# Patient Record
Sex: FEMALE | Race: WHITE | Employment: OTHER | URBAN - METROPOLITAN AREA
[De-identification: names, ages, dates, MRNs, and addresses within clinical notes are randomized per-mention and may not be internally consistent; named-entity substitution may affect disease eponyms.]

---

## 2017-07-09 ENCOUNTER — APPOINTMENT (OUTPATIENT)
Dept: CT IMAGING | Age: 72
End: 2017-07-09
Attending: STUDENT IN AN ORGANIZED HEALTH CARE EDUCATION/TRAINING PROGRAM
Payer: OTHER GOVERNMENT

## 2017-07-09 ENCOUNTER — HOSPITAL ENCOUNTER (EMERGENCY)
Age: 72
Discharge: HOME | End: 2017-07-10
Attending: EMERGENCY MEDICINE
Payer: OTHER GOVERNMENT

## 2017-07-09 VITALS
RESPIRATION RATE: 18 BRPM | WEIGHT: 154.2 LBS | SYSTOLIC BLOOD PRESSURE: 190 MMHG | TEMPERATURE: 98.6 F | OXYGEN SATURATION: 99 % | DIASTOLIC BLOOD PRESSURE: 90 MMHG | HEART RATE: 99 BPM

## 2017-07-09 DIAGNOSIS — R31.9 URINARY TRACT INFECTION WITH HEMATURIA, SITE UNSPECIFIED: Primary | ICD-10-CM

## 2017-07-09 DIAGNOSIS — F31.9 BIPOLAR 1 DISORDER (HCC): ICD-10-CM

## 2017-07-09 DIAGNOSIS — H10.9 CONJUNCTIVITIS OF BOTH EYES, UNSPECIFIED CONJUNCTIVITIS TYPE: ICD-10-CM

## 2017-07-09 DIAGNOSIS — N39.0 URINARY TRACT INFECTION WITH HEMATURIA, SITE UNSPECIFIED: Primary | ICD-10-CM

## 2017-07-09 LAB
ALBUMIN SERPL BCP-MCNC: 4.5 G/DL (ref 3.5–5)
ALBUMIN/GLOB SERPL: 1.1 {RATIO} (ref 1.1–2.2)
ALP SERPL-CCNC: 63 U/L (ref 45–117)
ALT SERPL-CCNC: 24 U/L (ref 12–78)
ANION GAP BLD CALC-SCNC: 12 MMOL/L (ref 5–15)
APAP SERPL-MCNC: <2 UG/ML (ref 10–30)
APPEARANCE UR: ABNORMAL
AST SERPL W P-5'-P-CCNC: 13 U/L (ref 15–37)
BACTERIA URNS QL MICRO: ABNORMAL /HPF
BASOPHILS # BLD AUTO: 0 K/UL (ref 0–0.1)
BASOPHILS # BLD: 0 % (ref 0–1)
BILIRUB SERPL-MCNC: 0.8 MG/DL (ref 0.2–1)
BILIRUB UR QL: NEGATIVE
BUN SERPL-MCNC: 13 MG/DL (ref 6–20)
BUN/CREAT SERPL: 21 (ref 12–20)
CALCIUM SERPL-MCNC: 9.3 MG/DL (ref 8.5–10.1)
CHLORIDE SERPL-SCNC: 102 MMOL/L (ref 97–108)
CO2 SERPL-SCNC: 23 MMOL/L (ref 21–32)
COLOR UR: ABNORMAL
CREAT SERPL-MCNC: 0.61 MG/DL (ref 0.55–1.02)
EOSINOPHIL # BLD: 0.1 K/UL (ref 0–0.4)
EOSINOPHIL NFR BLD: 1 % (ref 0–7)
EPITH CASTS URNS QL MICRO: ABNORMAL /LPF
ERYTHROCYTE [DISTWIDTH] IN BLOOD BY AUTOMATED COUNT: 12.4 % (ref 11.5–14.5)
ETHANOL SERPL-MCNC: <10 MG/DL
GLOBULIN SER CALC-MCNC: 4.1 G/DL (ref 2–4)
GLUCOSE SERPL-MCNC: 158 MG/DL (ref 65–100)
GLUCOSE UR STRIP.AUTO-MCNC: NEGATIVE MG/DL
HCT VFR BLD AUTO: 43.9 % (ref 35–47)
HGB BLD-MCNC: 15.5 G/DL (ref 11.5–16)
HGB UR QL STRIP: ABNORMAL
HYALINE CASTS URNS QL MICRO: ABNORMAL /LPF (ref 0–5)
KETONES UR QL STRIP.AUTO: NEGATIVE MG/DL
LEUKOCYTE ESTERASE UR QL STRIP.AUTO: ABNORMAL
LYMPHOCYTES # BLD AUTO: 31 % (ref 12–49)
LYMPHOCYTES # BLD: 2.7 K/UL (ref 0.8–3.5)
MCH RBC QN AUTO: 30.6 PG (ref 26–34)
MCHC RBC AUTO-ENTMCNC: 35.3 G/DL (ref 30–36.5)
MCV RBC AUTO: 86.6 FL (ref 80–99)
MONOCYTES # BLD: 0.5 K/UL (ref 0–1)
MONOCYTES NFR BLD AUTO: 6 % (ref 5–13)
NEUTS SEG # BLD: 5.4 K/UL (ref 1.8–8)
NEUTS SEG NFR BLD AUTO: 62 % (ref 32–75)
NITRITE UR QL STRIP.AUTO: POSITIVE
PH UR STRIP: 5.5 [PH] (ref 5–8)
PLATELET # BLD AUTO: 218 K/UL (ref 150–400)
POTASSIUM SERPL-SCNC: 3.4 MMOL/L (ref 3.5–5.1)
PROT SERPL-MCNC: 8.6 G/DL (ref 6.4–8.2)
PROT UR STRIP-MCNC: 30 MG/DL
RBC # BLD AUTO: 5.07 M/UL (ref 3.8–5.2)
RBC #/AREA URNS HPF: ABNORMAL /HPF (ref 0–5)
SALICYLATES SERPL-MCNC: 2.2 MG/DL (ref 2.8–20)
SODIUM SERPL-SCNC: 137 MMOL/L (ref 136–145)
SP GR UR REFRACTOMETRY: 1.01 (ref 1–1.03)
UROBILINOGEN UR QL STRIP.AUTO: 0.2 EU/DL (ref 0.2–1)
WBC # BLD AUTO: 8.7 K/UL (ref 3.6–11)
WBC URNS QL MICRO: >100 /HPF (ref 0–4)

## 2017-07-09 PROCEDURE — 36415 COLL VENOUS BLD VENIPUNCTURE: CPT | Performed by: STUDENT IN AN ORGANIZED HEALTH CARE EDUCATION/TRAINING PROGRAM

## 2017-07-09 PROCEDURE — 90791 PSYCH DIAGNOSTIC EVALUATION: CPT

## 2017-07-09 PROCEDURE — 80307 DRUG TEST PRSMV CHEM ANLYZR: CPT | Performed by: EMERGENCY MEDICINE

## 2017-07-09 PROCEDURE — 80053 COMPREHEN METABOLIC PANEL: CPT | Performed by: STUDENT IN AN ORGANIZED HEALTH CARE EDUCATION/TRAINING PROGRAM

## 2017-07-09 PROCEDURE — 70450 CT HEAD/BRAIN W/O DYE: CPT

## 2017-07-09 PROCEDURE — 96365 THER/PROPH/DIAG IV INF INIT: CPT

## 2017-07-09 PROCEDURE — 80307 DRUG TEST PRSMV CHEM ANLYZR: CPT | Performed by: STUDENT IN AN ORGANIZED HEALTH CARE EDUCATION/TRAINING PROGRAM

## 2017-07-09 PROCEDURE — 99284 EMERGENCY DEPT VISIT MOD MDM: CPT

## 2017-07-09 PROCEDURE — 85025 COMPLETE CBC W/AUTO DIFF WBC: CPT | Performed by: STUDENT IN AN ORGANIZED HEALTH CARE EDUCATION/TRAINING PROGRAM

## 2017-07-09 PROCEDURE — 81001 URINALYSIS AUTO W/SCOPE: CPT | Performed by: STUDENT IN AN ORGANIZED HEALTH CARE EDUCATION/TRAINING PROGRAM

## 2017-07-10 LAB
AMPHET UR QL SCN: NEGATIVE
BARBITURATES UR QL SCN: NEGATIVE
BENZODIAZ UR QL: NEGATIVE
CANNABINOIDS UR QL SCN: NEGATIVE
COCAINE UR QL SCN: NEGATIVE
DRUG SCRN COMMENT,DRGCM: NORMAL
METHADONE UR QL: NEGATIVE
OPIATES UR QL: NEGATIVE
PCP UR QL: NEGATIVE

## 2017-07-10 PROCEDURE — 74011250636 HC RX REV CODE- 250/636: Performed by: EMERGENCY MEDICINE

## 2017-07-10 PROCEDURE — 74011250637 HC RX REV CODE- 250/637: Performed by: EMERGENCY MEDICINE

## 2017-07-10 PROCEDURE — 74011000258 HC RX REV CODE- 258: Performed by: EMERGENCY MEDICINE

## 2017-07-10 RX ORDER — LORAZEPAM 1 MG/1
1 TABLET ORAL
Status: COMPLETED | OUTPATIENT
Start: 2017-07-10 | End: 2017-07-10

## 2017-07-10 RX ORDER — CEPHALEXIN 500 MG/1
500 CAPSULE ORAL 3 TIMES DAILY
Qty: 21 CAP | Refills: 0 | Status: SHIPPED | OUTPATIENT
Start: 2017-07-10 | End: 2017-07-17

## 2017-07-10 RX ORDER — CEFTRIAXONE 1 G/1
INJECTION, POWDER, FOR SOLUTION INTRAMUSCULAR; INTRAVENOUS
Status: DISCONTINUED
Start: 2017-07-10 | End: 2017-07-10 | Stop reason: HOSPADM

## 2017-07-10 RX ORDER — ERYTHROMYCIN 5 MG/G
1 OINTMENT OPHTHALMIC EVERY 6 HOURS
Qty: 28 TUBE | Refills: 0 | Status: SHIPPED | OUTPATIENT
Start: 2017-07-10 | End: 2017-07-17

## 2017-07-10 RX ORDER — SODIUM CHLORIDE 900 MG/100ML
INJECTION INTRAVENOUS
Status: DISCONTINUED
Start: 2017-07-10 | End: 2017-07-10 | Stop reason: HOSPADM

## 2017-07-10 RX ORDER — LORAZEPAM 1 MG/1
1 TABLET ORAL
Qty: 12 TAB | Refills: 0 | Status: ON HOLD | OUTPATIENT
Start: 2017-07-10 | End: 2018-07-26

## 2017-07-10 RX ADMIN — LORAZEPAM 1 MG: 1 TABLET ORAL at 02:15

## 2017-07-10 RX ADMIN — CEFTRIAXONE 1 G: 1 INJECTION, POWDER, FOR SOLUTION INTRAMUSCULAR; INTRAVENOUS at 01:23

## 2017-07-10 NOTE — ED PROVIDER NOTES
HPI Comments: 70 y.o. female with past medical history significant for HTN, diabetes, anxiety, bipolar 1 disorder who presents accompanied by son with chief complaint of altered mental status. Son states patient has been confused and has not been making sense since 7/4/17, stating she was fine when she came up from [place] on 3/23/17. Son states patient recently moved into an independent living facility, with patient stating \"that place isn't suitable for dogs. \" Son states he's heard her arguing with neighbors and she has been \"throwing stuff around. \" Son states patient has had behavioral issues in the past and was given Depakote with patient states \"it was because of my stress. \" Son believes that patient may have stopped taking her medication because she feels as if she no longer needs them. Son states patient was admitted for 1 week while in [place] for \"behavior modification. \" Son states patient fell and hit her head while she was in [place]. Son states patient has had some b/l eye redness for approx 1 month, stating that it's worsened since this morning. Patient states her eyes are \"gritty\" and states she's had some double vision and light sensitivity. Patient states her right eye is normally worse than the left. Patient also complains of nausea and a left sided headache. There are no other acute medical concerns at this time. Social hx: never smoker, no alcohol  PCP: None    Note written by uJan Daniel Duque, as dictated by Teodoro Jolly MD 10:37 PM     The history is provided by the patient and a relative. No  was used. Past Medical History:   Diagnosis Date    Anxiety     Bipolar 1 disorder [facility])     son not sure where patient was diagnosed    Diabetes ([facility] [address]     Hypertension        History reviewed. No pertinent surgical history. History reviewed. No pertinent family history.     Social History     Social History    Marital status:      Spouse name: N/A   [facility] Number of children: N/A    Years of education: N/A     Occupational History    Not on file. Social History Main Topics    Smoking status: Never Smoker    Smokeless tobacco: Never Used    Alcohol use No    Drug use: Not on file    Sexual activity: Not on file     Other Topics Concern    Not on file     Social History Narrative    No narrative on file         ALLERGIES: Review of patient's allergies indicates no known allergies. Review of Systems   Constitutional: Negative for fever. HENT: Negative for facial swelling. Eyes: Positive for photophobia, discharge, redness, itching and visual disturbance. Respiratory: Negative for chest tightness. Cardiovascular: Negative for chest pain. Gastrointestinal: Positive for nausea. Negative for abdominal pain. Genitourinary: Negative for dysuria. Musculoskeletal: Negative for arthralgias. Skin: Negative for rash. Neurological: Positive for headaches. Negative for dizziness. Hematological: Negative for adenopathy. Psychiatric/Behavioral: Positive for confusion. Negative for suicidal ideas. Vitals:    07/09/17 2114   BP: 190/90   Pulse: 99   Resp: 18   Temp: 98.6 °F (37 °C)   SpO2: 99%   Weight: 69.9 kg (154 lb 3.2 oz)            Physical Exam   Constitutional: She is oriented to person, place, and time. She appears well-developed and well-nourished. No distress. HENT:   Head: Normocephalic and atraumatic. Mouth/Throat: Oropharynx is clear and moist.   Eyes: Pupils are equal, round, and reactive to light. Right conjunctiva is injected. Left conjunctiva is injected. Red conjunctiva with perilimbic sparing in both eyes, right greater than left   Neck: Normal range of motion. Neck supple. No thyromegaly present. Cardiovascular: Normal rate, regular rhythm, normal heart sounds and intact distal pulses. No murmur heard. Pulmonary/Chest: Effort normal and breath sounds normal. No respiratory distress. Abdominal: Soft.  Bowel sounds are normal. She exhibits no distension. There is no tenderness. Musculoskeletal: Normal range of motion. She exhibits no edema. Neurological: She is alert and oriented to person, place, and time. Skin: Skin is warm and dry. No rash noted. She is not diaphoretic. Psychiatric: Her affect is inappropriate. Some plative ideas   Nursing note and vitals reviewed. Note written by Juan Daniel Izaguirre, as dictated by Jimenez Perez MD 10:59 PM      MDM  Number of Diagnoses or Management Options  Bipolar 1 disorder [facility]): Conjunctivitis of both eyes, unspecified conjunctivitis type:   Urinary tract infection with hematuria, site unspecified:   Diagnosis management comments: A:  79yo F accompanied by son for increasing confused behavior and pressured speech. Pt with h/o Bipolar and son suspects not taking medications. Denies SI/HI. Pt also notes b/l eye irritation for past several days. VS stable. P:  [facility] consult  Noncompliance with bipolar medications  Labs UA  B/l conjunctivitis. ED Course       Procedures    1:58 AM  Pt seen by [facility] and cleared for discharge. Plan for outpatient f/u. Keflex for UTI. Erythromycin eye ointment for b/l conjunctivitis and f/u with [facility].

## 2017-07-10 NOTE — ED TRIAGE NOTES
Triage: Patient arrives ambulatory from home escorted by children seeking psychiatric admission. Patients son states this happened before, but also believes mother may have had a seizure earlier. He noticed some jerking motions and reports his mother never lost consciousness, but did lose control of her bladder. Patient having flight of ideas in triage, very difficult to interview and redirect. Son reports his mother arrived from [place] to be moved into assisted living and has had disputes there, reports she is under a great deal of stress at this time. Denies ETOH/drug use. Patient has complaints of \"feeling she has a UTI\" and is sleep deprived. Patient is oriented x 4. Denies SI/HI.

## 2017-07-10 NOTE — ED NOTES
Patient ambulatory to bathroom. Urine sample provided. Nurse at bedside patient asking Alexi Numbers you real?, I've been sleep walking and I just don't know. Where am I? Am I in [place]? I want to go [place] I need to get my passport, cause Trump\". Patient then stating \"I need you to check my urine and blood so I can call the cab\".

## 2017-07-10 NOTE — BSMART NOTE
Comprehensive Assessment Form Part 1      Section I - Disposition    Axis I -  Unspecified Bipolar Disorder  Axis II - Deferred  Axis III -   Past Medical History:   Diagnosis Date    Anxiety     Bipolar 1 disorder [facility])     son not sure where patient was diagnosed    Diabetes ([facility] [address]     Hypertension        Axis IV - Recent move from [place], conflicts at independent living facility  [place] [facility] - 40      The Medical Doctor to Psychiatrist conference was not completed. The Medical Doctor is in agreement with Psychiatrist disposition because of (reason) Patient is pending medical clearance. The plan is disposition with psychiatrist when cleared medically. The on-call Psychiatrist consulted was Dr. Gonsales be called when cleared. The admitting Psychiatrist will be Dr. Nataly Lr. The admitting Diagnosis is Bipolar. The Payor source is  [facility]    Section II - Integrated Summary  Summary:  Patient came in accompanied by her son due to multiple complaints. Patient reported she had \"five little seizures,\" headache, back pain and muscle spasms. Patient has a psychiatric history but son with her is unclear of all details. He reported she was admitted in [place] and was on Depakote. He indicated she stops taking medication. Patient presents as anxious and tangential.  Patient does not often answer a direct question. Patient will ramble on about loosely related topics. Patient reported decreased appetite. She denied SI, HI, and hallucinations. Patient reported she \"disassociates\" and speaks of Shae Steen, and Adriano Vargas wanting to [place] people. \"  Per son she has recently moved here and is having disputes with other residents in the facility. Patient reported she wants to leave that place and then starts going off on tangent about [place] and [employer]. Patient reported she throws things to make herself feel better. The patienthas demonstrated mental capacity to provide informed consent.   The information is given by the patient and relative(s). The Chief Complaint is multiple symptoms. The Precipitant Factors are recent move, medication noncompliance. Previous Hospitalizations: Yes  Current Psychiatrist and/or  is NA. Lethality Assessment:    The potential for suicide noted by the following:Patient denied SI and history of attempts. The potential for homicide is not noted. The patient has not been a perpetrator of sexual or physical abuse. There are not pending charges. The patient is not felt to be at risk for self harm or harm to others. The attending nurse was advised that security has not been notified. Section III - Psychosocial  The patient's overall mood and attitude is anxious. Feelings of helplessness and hopelessness are not observed. Generalized anxiety is not observed. Panic is not observed. Phobias are not observed. Obsessive compulsive tendencies are not observed. Section IV - Mental Status Exam  The patient's appearance shows no evidence of impairment. The patient's behavior is manic  and is restless. The patient is oriented to time, place, person and situation. The patient's speech is pressured. The patient's mood is anxious. The range of affect is innappropriate. The patient's thought content demonstrates no evidence of impairment. The thought process shows a flight of ideas and is tangential.  The patient's perception shows no evidence of impairment. The patient's memory shows no evidence of impairment. The patient's appetite is decreased. The patient's sleep has evidence of insomnia. The patient shows no insight. The patient's judgement is psychologically impaired. Section V - Substance Abuse  The patient is not using substances. Section VI - Living Arrangements  The patient is . The patient lives alone. The patient has 3 children . The patient does plan to return home upon discharge. The patient does not have legal issues pending.  The patient's source of income comes from social security. Buddhist and cultural practices have not been voiced at this time. The patient's greatest support comes from son and this person will be involved with the treatment. The patient has not been in an event described as horrible or outside the realm of ordinary life experience either currently or in the past.  The patient has not been a victim of sexual/physical abuse. Section VII - Other Areas of Clinical Concern  The highest grade achieved is NA with the overall quality of school experience being described as NA. The patient is currently unemployed and speaks [place] as a primary language. The patient has no communication impairments affecting communication. The patient's preference for learning can be described as: can read and write adequately.   The patient's hearing is normal.  The patient's vision is normal.      Parish Otoole, MARTHA

## 2017-07-10 NOTE — DISCHARGE INSTRUCTIONS
Pinkeye: Care Instructions  Your Care Instructions    Pinkeye is redness and swelling of the eye surface and the conjunctiva (the lining of the eyelid and the covering of the white part of the eye). Pinkeye is also called conjunctivitis. Pinkeye is often caused by infection with bacteria or a virus. Dry air, allergies, smoke, and chemicals are other common causes. Pinkeye often clears on its own in 7 to 10 days. Antibiotics only help if the pinkeye is caused by bacteria. Pinkeye caused by infection spreads easily. If an allergy or chemical is causing pinkeye, it will not go away unless you can avoid whatever is causing it. Follow-up care is a key part of your treatment and safety. Be sure to make and go to all appointments, and call your doctor if you are having problems. It's also a good idea to know your test results and keep a list of the medicines you take. How can you care for yourself at home? · Wash your hands often. Always wash them before and after you treat pinkeye or touch your eyes or face. · Use moist cotton or a clean, wet cloth to remove crust. Wipe from the inside corner of the eye to the outside. Use a clean part of the cloth for each wipe. · Put cold or warm wet cloths on your eye a few times a day if the eye hurts. · Do not wear contact lenses or eye makeup until the pinkeye is gone. Throw away any eye makeup you were using when you got pinkeye. Clean your contacts and storage case. If you wear disposable contacts, use a new pair when your eye has cleared and it is safe to wear contacts again. · If the doctor gave you antibiotic ointment or eyedrops, use them as directed. Use the medicine for as long as instructed, even if your eye starts looking better soon. Keep the bottle tip clean, and do not let it touch the eye area. · To put in eyedrops or ointment:  ¨ Tilt your head back, and pull your lower eyelid down with one finger.   ¨ Drop or squirt the medicine inside the lower lid.  ¨ Close your eye for 30 to 60 seconds to let the drops or ointment move around. ¨ Do not touch the ointment or dropper tip to your eyelashes or any other surface. · Do not share towels, pillows, or washcloths while you have pinkeye. When should you call for help? Call your doctor now or seek immediate medical care if:  · You have pain in your eye, not just irritation on the surface. · You have a change in vision or loss of vision. · You have an increase in discharge from the eye. · Your eye has not started to improve or begins to get worse within 48 hours after you start using antibiotics. · Pinkeye lasts longer than 7 days. Watch closely for changes in your health, and be sure to contact your doctor if you have any problems. Where can you learn more? Go to http://saulTxCellreynold.info/. Enter Y392 in the search box to learn more about \"Pinkeye: Care Instructions. \"  Current as of: March 20, 2017  Content Version: 11.3  © 7623-6482 [employer]. Care instructions adapted under license by [employer] (which disclaims liability or warranty for this information). If you have questions about a medical condition or this instruction, always ask your healthcare professional. [address] any warranty or liability for your use of this information. Urinary Tract Infection in Women: Care Instructions  Your Care Instructions    A urinary tract infection, or UTI, is a general term for an infection anywhere between the kidneys and the urethra (where urine comes out). Most UTIs are bladder infections. They often cause pain or burning when you urinate. UTIs are caused by bacteria and can be cured with antibiotics. Be sure to complete your treatment so that the infection goes away. Follow-up care is a key part of your treatment and safety. Be sure to make and go to all appointments, and call your doctor if you are having problems.  It's also a good idea to know your test results and keep a list of the medicines you take. How can you care for yourself at home? · Take your antibiotics as directed. Do not stop taking them just because you feel better. You need to take the full course of antibiotics. · Drink extra water and other fluids for the next day or two. This may help wash out the bacteria that are causing the infection. (If you have kidney, heart, or liver disease and have to limit fluids, talk with your doctor before you increase your fluid intake.)  · Avoid drinks that are carbonated or have caffeine. They can irritate the bladder. · Urinate often. Try to empty your bladder each time. · To relieve pain, take a hot bath or lay a heating pad set on low over your lower belly or genital area. Never go to sleep with a heating pad in place. To prevent UTIs  · Drink plenty of water each day. This helps you urinate often, which clears bacteria from your system. (If you have kidney, heart, or liver disease and have to limit fluids, talk with your doctor before you increase your fluid intake.)  · Urinate when you need to. · Urinate right after you have sex. · Change sanitary pads often. · Avoid douches, bubble baths, feminine hygiene sprays, and other feminine hygiene products that have deodorants. · After going to the bathroom, wipe from front to back. When should you call for help? Call your doctor now or seek immediate medical care if:  · Symptoms such as fever, chills, nausea, or vomiting get worse or appear for the first time. · You have new pain in your back just below your rib cage. This is called flank pain. · There is new blood or pus in your urine. · You have any problems with your antibiotic medicine. Watch closely for changes in your health, and be sure to contact your doctor if:  · You are not getting better after taking an antibiotic for 2 days. · Your symptoms go away but then come back. Where can you learn more?   Go to http://saul-reynold.info/. Enter Y056 in the search box to learn more about \"Urinary Tract Infection in Women: Care Instructions. \"  Current as of: November 28, 2016  Content Version: 11.3  © 0446-4741 [employer]. Care instructions adapted under license by [employer] (which disclaims liability or warranty for this information). If you have questions about a medical condition or this instruction, always ask your healthcare professional. [address] any warranty or liability for your use of this information. Learning About Mood Disorders  What are mood disorders? Mood disorders are medical problems that affect how you feel. They can impact your moods, thoughts, and actions. Mood disorders include:  · Depression. This causes you to feel sad or hopeless for much of the time. · Bipolar disorder. This causes extreme mood changes from manic episodes of very high energy to extreme lows of depression. · Seasonal affective disorder (SAD). This is a type of depression that affects you during the same season each year. Most often people experience SAD during the fall and winter months when days are shorter and there is less light. What are the symptoms? Depression  You may:  · Feel sad or hopeless nearly every day. · Lose interest in or not get pleasure from most daily activities. You feel this way nearly every day. · Have low energy, changes in your appetite, or changes in how well you sleep. · Have trouble concentrating. · Think about death and suicide. Keep the numbers for these national suicide hotlines: 2-090-085-TALK (5-979.263.8604) and 4-437-GWAKSCY (7-105.730.9917). If you or someone you know talks about suicide or feeling hopeless, get help right away. Bipolar disorder  Symptoms depend on your mood swings. You may:  · Feel very happy, energetic, or on edge. · Feel like you need very little sleep.   · Feel overly self-confident. · Do impulsive things, such as spending a lot of money. · Feel sad or hopeless. · Have racing thoughts or trouble thinking and making decisions. · Lose interest in things you have enjoyed in the past.  · Think about death and suicide. Keep the numbers for these national suicide hotlines: 3-525-123-TALK (2-491.501.5590) and 7-163-INZDIGO (1-962.831.8190). If you or someone you know talks about suicide or feeling hopeless, get help right away. Seasonal affective disorder (SAD)  Symptoms come and go at about the same time each year. For most people with SAD, symptoms come during the winter when there is less daylight. You may:  · Feel sad, grumpy, conn, or anxious. · Lose interest in your usual activities. · Eat more and crave carbohydrates, such as bread and pasta. · Gain weight. · Sleep more and feel drowsy during the daytime. How are mood disorders treated? Mood disorders can be treated with medicines or counseling, or a combination of both. Medicines for depression and SAD may include antidepressants. Medicines for bipolar disorder may include:  · Mood stabilizers. · Antipsychotics. · Benzodiazepines. Counseling may involve cognitive-behavioral therapy. It teaches you how to change the ways you think and behave. This can help you stop thinking bad thoughts about yourself and your life. Light therapy is the main treatment for SAD. This therapy uses a special kind of lamp. You let the lamp shine on you at certain times, usually in the morning. This may help your symptoms during the months when there is less sunlight. Healthy lifestyle  Healthy lifestyle changes may help you feel better. · Get at least 30 minutes of exercise on most days of the week. Walking is a good choice. · Eat a healthy diet. Include fruits, vegetables, lean proteins, and whole grains in your diet each day. · Keep a regular sleep schedule. Try for 8 hours of sleep a night.   · Find ways to manage stress, such as relaxation exercises. · Avoid alcohol and illegal drugs. Follow-up care is a key part of your treatment and safety. Be sure to make and go to all appointments, and call your doctor if you are having problems. It's also a good idea to know your test results and keep a list of the medicines you take. Where can you learn more? Go to http://saul-reynold.info/. Enter Z931 in the search box to learn more about \"Learning About Mood Disorders. \"  Current as of: May 3, 2017  Content Version: 11.3  © 2200-9113 "[employer]. Care instructions adapted under license by [employer] (which disclaims liability or warranty for this information). If you have questions about a medical condition or this instruction, always ask your healthcare professional. [address] any warranty or liability for your use of this information. We hope that we have addressed all of your medical concerns. The examination and treatment you received in the Emergency Department were for an emergent problem and were not intended as complete care. It is important that you follow up with your healthcare provider(s) for ongoing care. If your symptoms worsen or do not improve as expected, and you are unable to reach your usual health care provider(s), you should return to the Emergency Department. Today's healthcare is undergoing tremendous change, and patient satisfaction surveys are one of the many tools to assess the quality of medical care. You may receive a survey from the [employer] regarding your experience in the Emergency Department. I hope that your experience has been completely positive, particularly the medical care that I provided. As such, please participate in the survey; anything less than excellent does not meet my expectations or intentions.         [address] [facility] and [facility] participate in nationally recognized quality of care measures. If your blood pressure is greater than 120/80, as reported below, we urge that you seek medical care to address the potential of high blood pressure, commonly known as hypertension. Hypertension can be hereditary or can be caused by certain medical conditions, pain, stress, or \"white coat syndrome. \"       Please make an appointment with your health care provider(s) for follow up of your Emergency Department visit. VITALS:   Patient Vitals for the past 8 hrs:   Temp Pulse Resp BP SpO2   07/09/17 2114 98.6 °F (37 °C) 99 18 190/90 99 %          Thank you for allowing us to provide you with medical care today. We realize that you have many choices for your emergency care needs. Please choose us in the future for any continued health care needs. Latrice Khan MD    [facility], [facility].   Office: [phone]            Recent Results (from the past 24 hour(s))   CBC WITH AUTOMATED DIFF    Collection Time: 07/09/17  9:22 PM   Result Value Ref Range    WBC 8.7 3.6 - 11.0 K/uL    RBC 5.07 3.80 - 5.20 M/uL    HGB 15.5 11.5 - 16.0 g/dL    HCT 43.9 35.0 - 47.0 %    MCV 86.6 80.0 - 99.0 FL    MCH 30.6 26.0 - 34.0 PG    MCHC 35.3 30.0 - 36.5 g/dL    RDW 12.4 11.5 - 14.5 %    PLATELET 619 813 - 112 K/uL    NEUTROPHILS 62 32 - 75 %    LYMPHOCYTES 31 12 - 49 %    MONOCYTES 6 5 - 13 %    EOSINOPHILS 1 0 - 7 %    BASOPHILS 0 0 - 1 %    ABS. NEUTROPHILS 5.4 1.8 - 8.0 K/UL    ABS. LYMPHOCYTES 2.7 0.8 - 3.5 K/UL    ABS. MONOCYTES 0.5 0.0 - 1.0 K/UL    ABS. EOSINOPHILS 0.1 0.0 - 0.4 K/UL    ABS.  BASOPHILS 0.0 0.0 - 0.1 K/UL   METABOLIC PANEL, COMPREHENSIVE    Collection Time: 07/09/17  9:22 PM   Result Value Ref Range    Sodium 137 136 - 145 mmol/L    Potassium 3.4 (L) 3.5 - 5.1 mmol/L    Chloride 102 97 - 108 mmol/L    CO2 23 21 - 32 mmol/L    Anion gap 12 5 - 15 mmol/L    Glucose 158 (H) 65 - 100 mg/dL    BUN 13 6 - 20 MG/DL    Creatinine 0.61 0.55 - 1.02 MG/DL    BUN/Creatinine ratio 21 (H) 12 - 20      GFR est AA >60 >60 ml/min/1.73m2    GFR est non-AA >60 >60 ml/min/1.73m2    Calcium 9.3 8.5 - 10.1 MG/DL    Bilirubin, total 0.8 0.2 - 1.0 MG/DL    ALT (SGPT) 24 12 - 78 U/L    AST (SGOT) 13 (L) 15 - 37 U/L    Alk. phosphatase 63 45 - 117 U/L    Protein, total 8.6 (H) 6.4 - 8.2 g/dL    Albumin 4.5 3.5 - 5.0 g/dL    Globulin 4.1 (H) 2.0 - 4.0 g/dL    A-G Ratio 1.1 1.1 - 2.2     ETHYL ALCOHOL    Collection Time: 07/09/17  9:22 PM   Result Value Ref Range    ALCOHOL(ETHYL),SERUM <10 <10 MG/DL   ACETAMINOPHEN    Collection Time: 07/09/17  9:22 PM   Result Value Ref Range    Acetaminophen level <2 (L) 10 - 30 ug/mL   SALICYLATE    Collection Time: 07/09/17  9:22 PM   Result Value Ref Range    SALICYLATE 2.2 (L) 2.8 - 20.0 MG/DL   DRUG SCREEN, URINE    Collection Time: 07/09/17 11:40 PM   Result Value Ref Range    AMPHETAMINES NEGATIVE  NEG      BARBITURATES NEGATIVE  NEG      BENZODIAZEPINE NEGATIVE  NEG      COCAINE NEGATIVE  NEG      METHADONE NEGATIVE  NEG      OPIATES NEGATIVE  NEG      PCP(PHENCYCLIDINE) NEGATIVE  NEG      THC (TH-CANNABINOL) NEGATIVE  NEG      Drug screen comment (NOTE)    URINALYSIS W/MICROSCOPIC    Collection Time: 07/09/17 11:41 PM   Result Value Ref Range    Color YELLOW/STRAW      Appearance CLOUDY (A) CLEAR      Specific gravity 1.010 1.003 - 1.030      pH (UA) 5.5 5.0 - 8.0      Protein 30 (A) NEG mg/dL    Glucose NEGATIVE  NEG mg/dL    Ketone NEGATIVE  NEG mg/dL    Bilirubin NEGATIVE  NEG      Blood SMALL (A) NEG      Urobilinogen 0.2 0.2 - 1.0 EU/dL    Nitrites POSITIVE (A) NEG      Leukocyte Esterase LARGE (A) NEG      WBC >100 (H) 0 - 4 /hpf    RBC 0-5 0 - 5 /hpf    Epithelial cells FEW FEW /lpf    Bacteria 4+ (A) NEG /hpf    Hyaline cast 0-2 0 - 5 /lpf       Ct Head Wo Cont    Result Date: 7/9/2017  EXAM:  CT HEAD WO CONT INDICATION:   \"worst headache of  her life\" COMPARISON: None.  TECHNIQUE: Unenhanced CT of the head was performed using 5 mm images. Brain and bone windows were generated. CT dose reduction was achieved through use of a standardized protocol tailored for this examination and automatic exposure control for dose modulation. FINDINGS: The ventricles and sulci are normal in size, shape and configuration and midline. There is no significant white matter disease. There is no intracranial hemorrhage, extra-axial collection, mass, mass effect or midline shift. The basilar cisterns are open. No acute infarct is identified. The bone windows demonstrate no abnormalities. The visualized portions of the paranasal sinuses and mastoid air cells are clear. IMPRESSION: No acute intracranial process.

## 2018-07-25 ENCOUNTER — HOSPITAL ENCOUNTER (INPATIENT)
Age: 73
LOS: 6 days | Discharge: HOME | DRG: 885 | End: 2018-08-01
Attending: EMERGENCY MEDICINE | Admitting: PSYCHIATRY & NEUROLOGY
Payer: MEDICARE

## 2018-07-25 ENCOUNTER — APPOINTMENT (OUTPATIENT)
Dept: CT IMAGING | Age: 73
DRG: 885 | End: 2018-07-25
Attending: EMERGENCY MEDICINE
Payer: MEDICARE

## 2018-07-25 DIAGNOSIS — E87.6 HYPOKALEMIA: ICD-10-CM

## 2018-07-25 DIAGNOSIS — F29 PSYCHOSIS, UNSPECIFIED PSYCHOSIS TYPE (HCC): ICD-10-CM

## 2018-07-25 DIAGNOSIS — R41.82 ALTERED MENTAL STATUS, UNSPECIFIED ALTERED MENTAL STATUS TYPE: Primary | ICD-10-CM

## 2018-07-25 LAB
ALBUMIN SERPL-MCNC: 3.6 G/DL (ref 3.5–5)
ALBUMIN/GLOB SERPL: 1.1 {RATIO} (ref 1.1–2.2)
ALP SERPL-CCNC: 57 U/L (ref 45–117)
ALT SERPL-CCNC: 33 U/L (ref 12–78)
ANION GAP SERPL CALC-SCNC: 10 MMOL/L (ref 5–15)
AST SERPL-CCNC: 20 U/L (ref 15–37)
BASOPHILS # BLD: 0 K/UL (ref 0–0.1)
BASOPHILS NFR BLD: 1 % (ref 0–1)
BILIRUB SERPL-MCNC: 0.5 MG/DL (ref 0.2–1)
BUN SERPL-MCNC: 14 MG/DL (ref 6–20)
BUN/CREAT SERPL: 23 (ref 12–20)
CALCIUM SERPL-MCNC: 9 MG/DL (ref 8.5–10.1)
CHLORIDE SERPL-SCNC: 104 MMOL/L (ref 97–108)
CO2 SERPL-SCNC: 28 MMOL/L (ref 21–32)
CREAT SERPL-MCNC: 0.61 MG/DL (ref 0.55–1.02)
DIFFERENTIAL METHOD BLD: NORMAL
EOSINOPHIL # BLD: 0.1 K/UL (ref 0–0.4)
EOSINOPHIL NFR BLD: 1 % (ref 0–7)
ERYTHROCYTE [DISTWIDTH] IN BLOOD BY AUTOMATED COUNT: 11.7 % (ref 11.5–14.5)
GLOBULIN SER CALC-MCNC: 3.4 G/DL (ref 2–4)
GLUCOSE SERPL-MCNC: 196 MG/DL (ref 65–100)
HCT VFR BLD AUTO: 37.3 % (ref 35–47)
HGB BLD-MCNC: 12.7 G/DL (ref 11.5–16)
IMM GRANULOCYTES # BLD: 0 K/UL (ref 0–0.04)
IMM GRANULOCYTES NFR BLD AUTO: 0 % (ref 0–0.5)
LYMPHOCYTES # BLD: 1.9 K/UL (ref 0.8–3.5)
LYMPHOCYTES NFR BLD: 33 % (ref 12–49)
MCH RBC QN AUTO: 31 PG (ref 26–34)
MCHC RBC AUTO-ENTMCNC: 34 G/DL (ref 30–36.5)
MCV RBC AUTO: 91 FL (ref 80–99)
MONOCYTES # BLD: 0.5 K/UL (ref 0–1)
MONOCYTES NFR BLD: 8 % (ref 5–13)
NEUTS SEG # BLD: 3.2 K/UL (ref 1.8–8)
NEUTS SEG NFR BLD: 57 % (ref 32–75)
NRBC # BLD: 0 K/UL (ref 0–0.01)
NRBC BLD-RTO: 0 PER 100 WBC
PLATELET # BLD AUTO: 266 K/UL (ref 150–400)
PMV BLD AUTO: 9.8 FL (ref 8.9–12.9)
POTASSIUM SERPL-SCNC: 3 MMOL/L (ref 3.5–5.1)
PROT SERPL-MCNC: 7 G/DL (ref 6.4–8.2)
RBC # BLD AUTO: 4.1 M/UL (ref 3.8–5.2)
SODIUM SERPL-SCNC: 142 MMOL/L (ref 136–145)
WBC # BLD AUTO: 5.7 K/UL (ref 3.6–11)

## 2018-07-25 PROCEDURE — 80053 COMPREHEN METABOLIC PANEL: CPT | Performed by: EMERGENCY MEDICINE

## 2018-07-25 PROCEDURE — 80307 DRUG TEST PRSMV CHEM ANLYZR: CPT | Performed by: EMERGENCY MEDICINE

## 2018-07-25 PROCEDURE — 74011250636 HC RX REV CODE- 250/636: Performed by: EMERGENCY MEDICINE

## 2018-07-25 PROCEDURE — 74176 CT ABD & PELVIS W/O CONTRAST: CPT

## 2018-07-25 PROCEDURE — 90791 PSYCH DIAGNOSTIC EVALUATION: CPT

## 2018-07-25 PROCEDURE — 96361 HYDRATE IV INFUSION ADD-ON: CPT

## 2018-07-25 PROCEDURE — 85025 COMPLETE CBC W/AUTO DIFF WBC: CPT | Performed by: EMERGENCY MEDICINE

## 2018-07-25 PROCEDURE — 99285 EMERGENCY DEPT VISIT HI MDM: CPT

## 2018-07-25 PROCEDURE — 84443 ASSAY THYROID STIM HORMONE: CPT | Performed by: PSYCHIATRY & NEUROLOGY

## 2018-07-25 PROCEDURE — 96360 HYDRATION IV INFUSION INIT: CPT

## 2018-07-25 PROCEDURE — 77030005563 HC CATH URETH INT MMGH -A

## 2018-07-25 PROCEDURE — 51701 INSERT BLADDER CATHETER: CPT

## 2018-07-25 PROCEDURE — 36415 COLL VENOUS BLD VENIPUNCTURE: CPT | Performed by: EMERGENCY MEDICINE

## 2018-07-25 RX ADMIN — SODIUM CHLORIDE 1000 ML: 900 INJECTION, SOLUTION INTRAVENOUS at 23:42

## 2018-07-25 NOTE — IP AVS SNAPSHOT
Summary of Care Report The Summary of Care report has been created to help improve care coordination. Users with access to [employer] or [address] (Web-based application) may access additional patient information including the Discharge Summary. If you are not currently a [address] user and need more information, please call the number listed below in the [place] [address] section and ask to be connected with Medical Records. Facility Information Name Address Phone [address] [facility] [address] [address] [phone] Patient Information Patient Name Sex DEANNA Galvez ([id number]) Female 1945 Discharge Information Admitting Provider Service Area Unit Jackson Russell MD / [phone] [address] [facility] / [phone] Discharge Provider Discharge Date/Time Discharge Disposition Destination (none) 2018 (Pending) AHR (none) Patient Language Language ENGLISH [13] Hospital Problems as of 2018  Never Reviewed Class Noted - Resolved Last Modified POA Active Problems * (Principal)Bipolar disorder ([facility] [address]  2018 - Present 2018 by Daniel Granger MD Yes Entered by Kimberly Agudelo MD  
  
Non-Hospital Problems as of 2018  Never Reviewed Class Noted - Resolved Last Modified Active Problems Psychosis  2018 - Present 2018 by Kimberly Agudelo MD  
  Entered by Jackson Russell MD  
  
You are allergic to the following No active allergies Current Discharge Medication List  
  
START taking these medications Dose & Instructions Dispensing Information Comments  
 atorvastatin 40 mg tablet Commonly known as:  LIPITOR Dose:  40 mg Take 1 Tab by mouth daily. Indications: hyperlipidemia Quantity:  30 Tab Refills:  0 lidocaine 5 % Commonly known as:  Cassie Crespo Apply patch to the affected area for 12 hours a day and remove for 12 hours a day. Indications: POSTHERPETIC NEURALGIA Quantity:  90 Each Refills:  0  
   
 lithium carbonate  mg CR tablet Commonly known as:  ESKALITH CR Dose:  900 mg Take 2 Tabs by mouth nightly. Indications: Bipolar Disorder Quantity:  60 Tab Refills:  0  
   
 OLANZapine 10 mg tablet Commonly known as:  ZyPREXA Dose:  10 mg Take 1 Tab by mouth nightly. Indications: BIPOLAR DISORDER IN REMISSION Quantity:  30 Tab Refills:  0 phenazopyridine 200 mg tablet Commonly known as:  PYRIDIUM Dose:  200 mg Take 1 Tab by mouth two (2) times a day for 3 days. Indications: URINARY TRACT IRRITATION Quantity:  6 Tab Refills:  0 CONTINUE these medications which have CHANGED Dose & Instructions Dispensing Information Comments  
 metFORMIN 500 mg tablet Commonly known as:  GLUCOPHAGE What changed:   
- how much to take - when to take this Dose:  500 mg Take 1 Tab by mouth two (2) times daily (with meals). Indications: type 2 diabetes mellitus Quantity:  60 Tab Refills:  0 CONTINUE these medications which have NOT CHANGED Dose & Instructions Dispensing Information Comments FISH  mg Cap Generic drug:  Omega-3 Fatty Acids Dose:  1000 mg Take 1,000 mg by mouth. Indications: hypertriglyceridemia Refills:  0 STOP taking these medications Comments  
 lisinopril 2.5 mg tablet Commonly known as:  PRINIVIL, ZESTRIL  
   
   
 SEROquel 100 mg tablet Generic drug:  QUEtiapine VITAMIN D3 1,000 unit tablet Generic drug:  cholecalciferol Follow-up Information Follow up With Details Comments Contact Info Wilma Brar On 10/8/2018 You have a 9:00am appointment with your new primary care provider.  Please arrive by 8:30am to complete new patient paperwork. Remember to bring your photo ID and insurance card. [facility] 
[address] 
[phone] Star Sidney On 10/4/2018 You have a 1:30pm appointment with the psychiatric nurse practitioner for medication management. Please arrive by 1:00pm to complete new patient paperwork. Remember to bring your photo ID and insurance card. [address] 
[address] [facility] [address] , [address] 
[phone] Discharge Instructions DISCHARGE SUMMARY 
 
NAME:Enedelia mAaya : 1945 MRN: [id number] The patient Bakari Wharton exhibits the ability to control behavior in a less restrictive environment. Patient's level of functioning is improving. No assaultive/destructive behavior has been observed for the past 24 hours. No suicidal/homicidal threat or behavior has been observed for the past 24 hours. There is no evidence of serious medication side effects. Patient has not been in physical or protective restraints for at least the past 24 hours. If weapons involved, how are they secured? No weapons involved. Is patient aware of and in agreement with discharge plan? Yes Arrangements for medication:  Prescriptions given to patient. Referral for substance abuse treatment? Patient is not using using substances/Not applicable. Referral for smoking cessation needed? Patient is not a smoker/Not applicable. Copy of discharge instructions to provider?:  Zully Larson; Dr. Ar Henry Arrangements for transportation home:  Son to . Keep all follow up appointments as scheduled, continue to take prescribed medications per physician instructions. Mental health crisis number:  882 or your local mental health crisis line number at [phone]. DISCHARGE SUMMARY from Nurse PATIENT INSTRUCTIONS: 
 
What to do at Home: 
Recommended activity: Activity as tolerated,  
 
 If you experience any of the following symptoms thoughts of harming self, racing thoughts and that are rapidly changing topic, feeling overwhelmed with anxiety, hopelessness , please follow up with your assigned providers and local crisis number. *  Please give a list of your current medications to your Primary Care Provider. *  Please update this list whenever your medications are discontinued, doses are 
    changed, or new medications (including over-the-counter products) are added. *  Please carry medication information at all times in case of emergency situations. These are general instructions for a healthy lifestyle: No smoking/ No tobacco products/ Avoid exposure to second hand smoke Surgeon General's Warning:  Quitting smoking now greatly reduces serious risk to your health. Obesity, smoking, and sedentary lifestyle greatly increases your risk for illness A healthy diet, regular physical exercise & weight monitoring are important for maintaining a healthy lifestyle You may be retaining fluid if you have a history of heart failure or if you experience any of the following symptoms:  Weight gain of 3 pounds or more overnight or 5 pounds in a week, increased swelling in our hands or feet or shortness of breath while lying flat in bed. Please call your doctor as soon as you notice any of these symptoms; do not wait until your next office visit. Recognize signs and symptoms of STROKE: 
 
F-face looks uneven A-arms unable to move or move unevenly S-speech slurred or non-existent T-time-call 911 as soon as signs and symptoms begin-DO NOT go Back to bed or wait to see if you get better-TIME IS BRAIN. Warning Signs of HEART ATTACK Call 911 if you have these symptoms: 
? Chest discomfort.  Most heart attacks involve discomfort in the center of the chest that lasts more than a few minutes, or that goes away and comes back. It can feel like uncomfortable pressure, squeezing, fullness, or pain. ? Discomfort in other areas of the upper body. Symptoms can include pain or discomfort in one or both arms, the back, neck, jaw, or stomach. ? Shortness of breath with or without chest discomfort. ? Other signs may include breaking out in a cold sweat, nausea, or lightheadedness. Don't wait more than five minutes to call [address]! Fast action can save your life. Calling 911 is almost always the fastest way to get lifesaving treatment. Emergency Medical Services staff can begin treatment when they arrive  up to an hour sooner than if someone gets to the hospital by car. The discharge information has been reviewed with the patient. The patient verbalized understanding. Discharge medications reviewed with the patient and appropriate educational materials and side effects teaching were provided. ___________________________________________________________________________________________________________________________________ Chart Review Routing History No Routing History on File

## 2018-07-25 NOTE — IP AVS SNAPSHOT
[address] [facility] [address] [place] [address] 
[phone] Patient: Theresa Tellez MRN: [id number] :1945 A check camilo indicates which time of day the medication should be taken. My Medications START taking these medications Instructions Each Dose to Equal  
 Morning Noon Evening Bedtime  
 atorvastatin 40 mg tablet Commonly known as:  LIPITOR Your next dose is:  9am  
   
 Take 1 Tab by mouth daily. Indications: hyperlipidemia 40 mg  
    
  
   
   
   
  
 lidocaine 5 % Commonly known as:  Twyal Prophet Apply patch to the affected area for 12 hours a day and remove for 12 hours a day. Indications: POSTHERPETIC NEURALGIA  
     
   
   
   
  
 lithium carbonate  mg CR tablet Commonly known as:  ESKALITH CR Your next dose is:  Bedtime Take 2 Tabs by mouth nightly. Indications: Bipolar Disorder 900 mg  
    
   
   
   
  
  
 OLANZapine 10 mg tablet Commonly known as:  ZyPREXA Your next dose is:  bedtime Take 1 Tab by mouth nightly. Indications: BIPOLAR DISORDER IN REMISSION 10 mg  
    
   
   
   
  
  
 phenazopyridine 200 mg tablet Commonly known as:  PYRIDIUM Your next dose is:  9am and 9pm  
   
 Take 1 Tab by mouth two (2) times a day for 3 days. Indications: URINARY TRACT IRRITATION  
 200 mg CHANGE how you take these medications Instructions Each Dose to Equal  
 Morning Noon Evening Bedtime  
 metFORMIN 500 mg tablet Commonly known as:  GLUCOPHAGE What changed:   
- how much to take - when to take this Your next dose is:  9am and 5pm  
   
 Take 1 Tab by mouth two (2) times daily (with meals). Indications: type 2 diabetes mellitus 500 mg CONTINUE taking these medications Instructions Each Dose to Equal  
 Morning Noon Evening Bedtime FISH  mg Cap Generic drug:  Omega-3 Fatty Acids Your next dose is:  9am  
   
 Take 1,000 mg by mouth. Indications: hypertriglyceridemia 1000 mg  
    
  
   
   
   
  
  
STOP taking these medications   
 lisinopril 2.5 mg tablet Commonly known as:  PRINIVIL, ZESTRIL  
   
  
 SEROquel 100 mg tablet Generic drug:  QUEtiapine VITAMIN D3 1,000 unit tablet Generic drug:  cholecalciferol Where to Get Your Medications Information on where to get these meds will be given to you by the nurse or doctor. ! Ask your nurse or doctor about these medications  
  atorvastatin 40 mg tablet  
 lidocaine 5 %  
 lithium carbonate  mg CR tablet  
 metFORMIN 500 mg tablet OLANZapine 10 mg tablet  
 phenazopyridine 200 mg tablet

## 2018-07-25 NOTE — IP AVS SNAPSHOT
[address] [facility] 
[phone] Patient: Luciana Myers MRN: [id number] :1945 About your hospitalization You were admitted on:  2018 You last received care in the:  [address] [place] [address] You were discharged on:  2018 Why you were hospitalized Your primary diagnosis was:  Bipolar Disorder (Hcc) Follow-up Information Follow up With Details Comments Contact Info Neal Dowling On 10/8/2018 You have a 9:00am appointment with your new primary care provider. Please arrive by 8:30am to complete new patient paperwork. Remember to bring your photo ID and insurance card. [facility] 
[address] [facility] [address] [facility], [address] [place] [address] 
[phone] [facility] On 10/4/2018 You have a 1:30pm appointment with the psychiatric nurse practitioner for medication management. Please arrive by 1:00pm to complete new patient paperwork. Remember to bring your photo ID and insurance card. [address] 
[address], [address] [facility], [address] [place] [address] 
[phone] Discharge Orders None A check camilo indicates which time of day the medication should be taken. My Medications START taking these medications Instructions Each Dose to Equal  
 Morning Noon Evening Bedtime  
 atorvastatin 40 mg tablet Commonly known as:  LIPITOR Your next dose is:  9am  
   
 Take 1 Tab by mouth daily. Indications: hyperlipidemia 40 mg  
    
  
   
   
   
  
 lidocaine 5 % Commonly known as:  Jonathon Schaefer Apply patch to the affected area for 12 hours a day and remove for 12 hours a day. Indications: POSTHERPETIC NEURALGIA  
     
   
   
   
  
 lithium carbonate  mg CR tablet Commonly known as:  ESKALITH CR Your next dose is:  Bedtime Take 2 Tabs by mouth nightly. Indications: Bipolar Disorder  900 mg  
    
   
   
   
  
  
 OLANZapine 10 mg tablet Commonly known as:  ZyPREXA Your next dose is:  bedtime Take 1 Tab by mouth nightly. Indications: BIPOLAR DISORDER IN REMISSION 10 mg  
    
   
   
   
  
  
 phenazopyridine 200 mg tablet Commonly known as:  PYRIDIUM Your next dose is:  9am and 9pm  
   
 Take 1 Tab by mouth two (2) times a day for 3 days. Indications: URINARY TRACT IRRITATION  
 200 mg CHANGE how you take these medications Instructions Each Dose to Equal  
 Morning Noon Evening Bedtime  
 metFORMIN 500 mg tablet Commonly known as:  GLUCOPHAGE What changed:   
- how much to take - when to take this Your next dose is:  9am and 5pm  
   
 Take 1 Tab by mouth two (2) times daily (with meals). Indications: type 2 diabetes mellitus 500 mg CONTINUE taking these medications Instructions Each Dose to Equal  
 Morning Noon Evening Bedtime FISH  mg Cap Generic drug:  Omega-3 Fatty Acids Your next dose is:  9am  
   
 Take 1,000 mg by mouth. Indications: hypertriglyceridemia 1000 mg  
    
  
   
   
   
  
  
STOP taking these medications   
 lisinopril 2.5 mg tablet Commonly known as:  PRINIVIL, ZESTRIL  
   
  
 SEROquel 100 mg tablet Generic drug:  QUEtiapine VITAMIN D3 1,000 unit tablet Generic drug:  cholecalciferol Where to Get Your Medications Information on where to get these meds will be given to you by the nurse or doctor. ! Ask your nurse or doctor about these medications  
  atorvastatin 40 mg tablet  
 lidocaine 5 %  
 lithium carbonate  mg CR tablet  
 metFORMIN 500 mg tablet OLANZapine 10 mg tablet  
 phenazopyridine 200 mg tablet Discharge Instructions DISCHARGE SUMMARY 
 
NAME:Enedelia Kevin : 1945 MRN: [id number] The patient Gasper Burden exhibits the ability to control behavior in a less restrictive environment. Patient's level of functioning is improving. No assaultive/destructive behavior has been observed for the past 24 hours. No suicidal/homicidal threat or behavior has been observed for the past 24 hours. There is no evidence of serious medication side effects. Patient has not been in physical or protective restraints for at least the past 24 hours. If weapons involved, how are they secured? No weapons involved. Is patient aware of and in agreement with discharge plan? Yes Arrangements for medication:  Prescriptions given to patient. Referral for substance abuse treatment? Patient is not using using substances/Not applicable. Referral for smoking cessation needed? Patient is not a smoker/Not applicable. Copy of discharge instructions to provider?:  Cindy Solis; Dr. Raphael Gilford Arrangements for transportation home:  Son to . Keep all follow up appointments as scheduled, continue to take prescribed medications per physician instructions. Mental health crisis number:  418 or your local mental health crisis line number at [phone]. DISCHARGE SUMMARY from Nurse PATIENT INSTRUCTIONS: 
 
What to do at Home: 
Recommended activity: Activity as tolerated, If you experience any of the following symptoms thoughts of harming self, racing thoughts and that are rapidly changing topic, feeling overwhelmed with anxiety, hopelessness , please follow up with your assigned providers and local crisis number. *  Please give a list of your current medications to your Primary Care Provider. *  Please update this list whenever your medications are discontinued, doses are 
    changed, or new medications (including over-the-counter products) are added. *  Please carry medication information at all times in case of emergency situations. These are general instructions for a healthy lifestyle: No smoking/ No tobacco products/ Avoid exposure to second hand smoke Surgeon General's Warning:  Quitting smoking now greatly reduces serious risk to your health. Obesity, smoking, and sedentary lifestyle greatly increases your risk for illness A healthy diet, regular physical exercise & weight monitoring are important for maintaining a healthy lifestyle You may be retaining fluid if you have a history of heart failure or if you experience any of the following symptoms:  Weight gain of 3 pounds or more overnight or 5 pounds in a week, increased swelling in our hands or feet or shortness of breath while lying flat in bed. Please call your doctor as soon as you notice any of these symptoms; do not wait until your next office visit. Recognize signs and symptoms of STROKE: 
 
F-face looks uneven A-arms unable to move or move unevenly S-speech slurred or non-existent T-time-call 911 as soon as signs and symptoms begin-DO NOT go Back to bed or wait to see if you get better-TIME IS BRAIN. Warning Signs of HEART ATTACK Call 911 if you have these symptoms: 
? Chest discomfort. Most heart attacks involve discomfort in the center of the chest that lasts more than a few minutes, or that goes away and comes back. It can feel like uncomfortable pressure, squeezing, fullness, or pain. ? Discomfort in other areas of the upper body. Symptoms can include pain or discomfort in one or both arms, the back, neck, jaw, or stomach. ? Shortness of breath with or without chest discomfort. ? Other signs may include breaking out in a cold sweat, nausea, or lightheadedness. Don't wait more than five minutes to call [address]! Fast action can save your life. Calling 911 is almost always the fastest way to get lifesaving treatment. Emergency Medical Services staff can begin treatment when they arrive  up to an hour sooner than if someone gets to the hospital by car. The discharge information has been reviewed with the patient. The patient verbalized understanding. Discharge medications reviewed with the patient and appropriate educational materials and side effects teaching were provided. ___________________________________________________________________________________________________________________________________ [employer] Announcement We are excited to announce that we are making your provider's discharge notes available to you in [employer]. You will see these notes when they are completed and signed by the physician that discharged you from your recent hospital stay. If you have any questions or concerns about any information you see in The Do[employer] Campaign, please call the Health Information Department where you were seen or reach out to your Primary Care Provider for more information about your plan of care. Introducing [facility]! Petar Lao introduces The Do[employer] patient portal. Now you can access parts of your medical record, email your doctor's office, and request medication refills online. 1. In your internet browser, go to [email or web address] 2. Click on the First Time User? Click Here link in the Sign In box. You will see the New Member Sign Up page. 3. Enter your [employer] Access Code exactly as it appears below. You will not need to use this code after youve completed the sign-up process. If you do not sign up before the expiration date, you must request a new code. · The [employer] Access Code: [email or web address] Expires: 10/23/2018 11:05 PM 
 
4. Enter the last four digits of your Social Security Number (xxxx) and Date of Birth (mm/dd/yyyy) as indicated and click Submit. You will be taken to the next sign-up page. 5. Create a The [employer] ID. This will be your MyChart login ID and cannot be changed, so think of one that is secure and easy to remember. 6. Create a [employer] password. You can change your password at any time. 7. Enter your Password Reset Question and Answer. This can be used at a later time if you forget your password. 8. Enter your e-mail address. You will receive e-mail notification when new information is available in [address] 9. Click Sign Up. You can now view and download portions of your medical record. 10. Click the Download Summary menu link to download a portable copy of your medical information. If you have questions, please visit the Frequently Asked Questions section of the BrainRush website. Remember, [employer]Rush is NOT to be used for urgent needs. For medical emergencies, dial 911. Now available from your iPhone and Android! Introducing Kip Saldana As a [employer] patient, I wanted to make you aware of our electronic visit tool called Kip Saldana. [employer] 24/7 allows you to connect within minutes with a medical provider 24 hours a day, seven days a week via a mobile device or tablet or logging into a secure website from your computer. You can access Kip Saldana from anywhere in the [place]. A virtual visit might be right for you when you have a simple condition and feel like you just dont want to get out of bed, or cant get away from work for an appointment, when your regular [employer] provider is not available (evenings, weekends or holidays), or when youre out of town and need minor care. Electronic visits cost only $49 and if the [employer] 24/7 provider determines a prescription is needed to treat your condition, one can be electronically transmitted to a nearby pharmacy*. Please take a moment to enroll today if you have not already done so. The enrollment process is free and takes just a few minutes. To enroll, please download the [employer] 24/7 salomon to your tablet or phone, or visit [email or web address] to enroll on your computer.    
And, as an [address] [place] [address] patient with a [employer] account, the results of your visits will be scanned into your electronic medical record and your primary care provider will be able to view the scanned results. We urge you to continue to see your regular [facility] provider for your ongoing medical care. And while your primary care provider may not be the one available when you seek a Kip V[employer] virtual visit, the peace of mind you get from getting a real diagnosis real time can be priceless. For more information on [employer], view our Frequently Asked Questions (FAQs) at [email or web address]. Sincerely, 
 
Hilda Toure MD 
Chief Medical Officer Luis Oneill *:  certain medications cannot be prescribed via MOGL Unresulted Labs-Please follow up with your PCP about these lab tests Order Current Status CULTURE, URINE In process Providers Seen During Your Hospitalization Provider Specialty Primary office phone Letitia Sharma MD Emergency Medicine [phone] Amelia Michaud MD Psychiatry [phone] Scott Tristan MD Psychiatry [phone] Your Primary Care Physician (PCP) Primary Care Physician Office Phone Office Fax NONE ** None ** ** None ** You are allergic to the following No active allergies Recent Documentation Height Weight Breastfeeding? BMI OB Status Smoking Status 1.651 m 69.9 kg No 25.63 kg/m2 Hysterectomy Never Smoker Emergency Contacts Name Discharge Info Relation Home Work Mobile Luis F Hebert DISCHARGE CAREGIVER [3] Other Relative [6] [phone] Patient Belongings The following personal items are in your possession at time of discharge: 
  Dental Appliances: None  Visual Aid: Glasses      Home Medications: None   Jewelry: None  Clothing: Dress, Socks    Other Valuables: Zettie Boss, Cell Phone, Eyeglasses, Rosa Duet  Personal Items Sent to Safe: none Please provide this summary of care documentation to your next provider. Signatures-by signing, you are acknowledging that this After Visit Summary has been reviewed with you and you have received a copy. Patient Signature:  ____________________________________________________________ Date:  ____________________________________________________________  
  
Janyth Mins Provider Signature:  ____________________________________________________________ Date:  ____________________________________________________________

## 2018-07-26 PROBLEM — F31.9 BIPOLAR DISORDER (HCC): Status: ACTIVE | Noted: 2018-07-26

## 2018-07-26 PROBLEM — F29 PSYCHOSIS (HCC): Status: ACTIVE | Noted: 2018-07-26

## 2018-07-26 LAB
AMPHET UR QL SCN: NEGATIVE
APAP SERPL-MCNC: <2 UG/ML (ref 10–30)
APPEARANCE UR: CLEAR
ATRIAL RATE: 84 BPM
BACTERIA URNS QL MICRO: NEGATIVE /HPF
BARBITURATES UR QL SCN: NEGATIVE
BENZODIAZ UR QL: NEGATIVE
BILIRUB UR QL: NEGATIVE
CALCULATED P AXIS, ECG09: 76 DEGREES
CALCULATED R AXIS, ECG10: 25 DEGREES
CALCULATED T AXIS, ECG11: 27 DEGREES
CANNABINOIDS UR QL SCN: NEGATIVE
COCAINE UR QL SCN: NEGATIVE
COLOR UR: YELLOW
DIAGNOSIS, 93000: NORMAL
DRUG SCRN COMMENT,DRGCM: NORMAL
EPITH CASTS URNS QL MICRO: ABNORMAL /LPF
EST. AVERAGE GLUCOSE BLD GHB EST-MCNC: 134 MG/DL
ETHANOL SERPL-MCNC: <10 MG/DL
GLUCOSE BLD STRIP.AUTO-MCNC: 167 MG/DL (ref 65–100)
GLUCOSE BLD STRIP.AUTO-MCNC: 167 MG/DL (ref 65–100)
GLUCOSE UR STRIP.AUTO-MCNC: NEGATIVE MG/DL
HBA1C MFR BLD: 6.3 % (ref 4.2–6.3)
HGB UR QL STRIP: ABNORMAL
KETONES UR QL STRIP.AUTO: NEGATIVE MG/DL
LEUKOCYTE ESTERASE UR QL STRIP.AUTO: NEGATIVE
METHADONE UR QL: NEGATIVE
NITRITE UR QL STRIP.AUTO: NEGATIVE
OPIATES UR QL: NEGATIVE
P-R INTERVAL, ECG05: 128 MS
PCP UR QL: NEGATIVE
PH UR STRIP: 6.5 [PH] (ref 5–8)
PROT UR STRIP-MCNC: NEGATIVE MG/DL
Q-T INTERVAL, ECG07: 422 MS
QRS DURATION, ECG06: 82 MS
QTC CALCULATION (BEZET), ECG08: 498 MS
RBC #/AREA URNS HPF: ABNORMAL /HPF (ref 0–5)
SALICYLATES SERPL-MCNC: <1.7 MG/DL (ref 2.8–20)
SERVICE CMNT-IMP: ABNORMAL
SERVICE CMNT-IMP: ABNORMAL
SP GR UR REFRACTOMETRY: 1.01 (ref 1–1.03)
TSH SERPL DL<=0.05 MIU/L-ACNC: 1.04 UIU/ML (ref 0.36–3.74)
UR CULT HOLD, URHOLD: NORMAL
UROBILINOGEN UR QL STRIP.AUTO: 0.2 EU/DL (ref 0.2–1)
VENTRICULAR RATE, ECG03: 84 BPM
WBC URNS QL MICRO: ABNORMAL /HPF (ref 0–4)

## 2018-07-26 PROCEDURE — 83036 HEMOGLOBIN GLYCOSYLATED A1C: CPT | Performed by: PSYCHIATRY & NEUROLOGY

## 2018-07-26 PROCEDURE — 65220000003 HC RM SEMIPRIVATE PSYCH

## 2018-07-26 PROCEDURE — 36415 COLL VENOUS BLD VENIPUNCTURE: CPT | Performed by: PSYCHIATRY & NEUROLOGY

## 2018-07-26 PROCEDURE — 81001 URINALYSIS AUTO W/SCOPE: CPT | Performed by: EMERGENCY MEDICINE

## 2018-07-26 PROCEDURE — 74011250637 HC RX REV CODE- 250/637: Performed by: PSYCHIATRY & NEUROLOGY

## 2018-07-26 PROCEDURE — 74011000250 HC RX REV CODE- 250: Performed by: PSYCHIATRY & NEUROLOGY

## 2018-07-26 PROCEDURE — 82962 GLUCOSE BLOOD TEST: CPT

## 2018-07-26 PROCEDURE — 74011250637 HC RX REV CODE- 250/637: Performed by: EMERGENCY MEDICINE

## 2018-07-26 PROCEDURE — 74011250636 HC RX REV CODE- 250/636: Performed by: PSYCHIATRY & NEUROLOGY

## 2018-07-26 PROCEDURE — 80307 DRUG TEST PRSMV CHEM ANLYZR: CPT | Performed by: EMERGENCY MEDICINE

## 2018-07-26 PROCEDURE — 93005 ELECTROCARDIOGRAM TRACING: CPT

## 2018-07-26 RX ORDER — MICONAZOLE NITRATE 20 MG/G
CREAM TOPICAL 2 TIMES DAILY
Status: DISCONTINUED | OUTPATIENT
Start: 2018-07-26 | End: 2018-08-01 | Stop reason: HOSPADM

## 2018-07-26 RX ORDER — METFORMIN HYDROCHLORIDE 500 MG/1
TABLET ORAL
COMMUNITY
End: 2018-08-01

## 2018-07-26 RX ORDER — IBUPROFEN 400 MG/1
400 TABLET ORAL
Status: DISCONTINUED | OUTPATIENT
Start: 2018-07-26 | End: 2018-08-01 | Stop reason: HOSPADM

## 2018-07-26 RX ORDER — METFORMIN HYDROCHLORIDE 500 MG/1
500 TABLET ORAL DAILY
Status: DISCONTINUED | OUTPATIENT
Start: 2018-07-27 | End: 2018-07-31

## 2018-07-26 RX ORDER — HALOPERIDOL 5 MG/1
5 TABLET ORAL
Status: DISCONTINUED | OUTPATIENT
Start: 2018-07-26 | End: 2018-08-01 | Stop reason: HOSPADM

## 2018-07-26 RX ORDER — DEXTROSE 50 % IN WATER (D50W) INTRAVENOUS SYRINGE
12.5-25 AS NEEDED
Status: DISCONTINUED | OUTPATIENT
Start: 2018-07-26 | End: 2018-08-01 | Stop reason: HOSPADM

## 2018-07-26 RX ORDER — BENZTROPINE MESYLATE 1 MG/ML
1 INJECTION INTRAMUSCULAR; INTRAVENOUS
Status: DISCONTINUED | OUTPATIENT
Start: 2018-07-26 | End: 2018-08-01 | Stop reason: HOSPADM

## 2018-07-26 RX ORDER — LISINOPRIL 2.5 MG/1
2.5 TABLET ORAL DAILY
COMMUNITY
End: 2018-08-01

## 2018-07-26 RX ORDER — DIPHENHYDRAMINE HYDROCHLORIDE 50 MG/ML
50 INJECTION, SOLUTION INTRAMUSCULAR; INTRAVENOUS
Status: DISCONTINUED | OUTPATIENT
Start: 2018-07-26 | End: 2018-08-01 | Stop reason: HOSPADM

## 2018-07-26 RX ORDER — IBUPROFEN 200 MG
1 TABLET ORAL
Status: DISCONTINUED | OUTPATIENT
Start: 2018-07-26 | End: 2018-08-01 | Stop reason: HOSPADM

## 2018-07-26 RX ORDER — LIDOCAINE 50 MG/G
3 PATCH TOPICAL EVERY 24 HOURS
Status: DISCONTINUED | OUTPATIENT
Start: 2018-07-26 | End: 2018-07-27 | Stop reason: DRUGHIGH

## 2018-07-26 RX ORDER — ZOLPIDEM TARTRATE 5 MG/1
5 TABLET ORAL
Status: DISCONTINUED | OUTPATIENT
Start: 2018-07-26 | End: 2018-07-31

## 2018-07-26 RX ORDER — INSULIN LISPRO 100 [IU]/ML
INJECTION, SOLUTION INTRAVENOUS; SUBCUTANEOUS
Status: DISCONTINUED | OUTPATIENT
Start: 2018-07-26 | End: 2018-08-01 | Stop reason: HOSPADM

## 2018-07-26 RX ORDER — LORAZEPAM 2 MG/1
2 TABLET ORAL
Status: DISCONTINUED | OUTPATIENT
Start: 2018-07-26 | End: 2018-08-01 | Stop reason: HOSPADM

## 2018-07-26 RX ORDER — DIVALPROEX SODIUM 500 MG/1
500 TABLET, DELAYED RELEASE ORAL EVERY 12 HOURS
Status: DISCONTINUED | OUTPATIENT
Start: 2018-07-26 | End: 2018-07-27

## 2018-07-26 RX ORDER — LORAZEPAM 2 MG/ML
2 INJECTION INTRAMUSCULAR
Status: DISCONTINUED | OUTPATIENT
Start: 2018-07-26 | End: 2018-08-01 | Stop reason: HOSPADM

## 2018-07-26 RX ORDER — BENZTROPINE MESYLATE 1 MG/1
1 TABLET ORAL
Status: DISCONTINUED | OUTPATIENT
Start: 2018-07-26 | End: 2018-08-01 | Stop reason: HOSPADM

## 2018-07-26 RX ORDER — ADHESIVE BANDAGE
30 BANDAGE TOPICAL DAILY PRN
Status: DISCONTINUED | OUTPATIENT
Start: 2018-07-26 | End: 2018-08-01 | Stop reason: HOSPADM

## 2018-07-26 RX ORDER — QUETIAPINE FUMARATE 100 MG/1
100 TABLET, FILM COATED ORAL
COMMUNITY
End: 2018-08-01

## 2018-07-26 RX ORDER — DIPHENHYDRAMINE HCL 50 MG
50 CAPSULE ORAL
Status: DISCONTINUED | OUTPATIENT
Start: 2018-07-26 | End: 2018-08-01 | Stop reason: HOSPADM

## 2018-07-26 RX ORDER — MAGNESIUM SULFATE 100 %
4 CRYSTALS MISCELLANEOUS AS NEEDED
Status: DISCONTINUED | OUTPATIENT
Start: 2018-07-26 | End: 2018-08-01 | Stop reason: HOSPADM

## 2018-07-26 RX ORDER — OLANZAPINE 2.5 MG/1
2.5 TABLET ORAL
Status: DISCONTINUED | OUTPATIENT
Start: 2018-07-26 | End: 2018-07-31

## 2018-07-26 RX ORDER — ACETAMINOPHEN 325 MG/1
650 TABLET ORAL
Status: DISCONTINUED | OUTPATIENT
Start: 2018-07-26 | End: 2018-08-01 | Stop reason: HOSPADM

## 2018-07-26 RX ORDER — MELATONIN
1000 DAILY
COMMUNITY
End: 2018-08-01

## 2018-07-26 RX ORDER — POTASSIUM CHLORIDE 750 MG/1
40 TABLET, FILM COATED, EXTENDED RELEASE ORAL
Status: COMPLETED | OUTPATIENT
Start: 2018-07-26 | End: 2018-07-26

## 2018-07-26 RX ORDER — HALOPERIDOL 5 MG/ML
5 INJECTION INTRAMUSCULAR
Status: DISCONTINUED | OUTPATIENT
Start: 2018-07-26 | End: 2018-08-01 | Stop reason: HOSPADM

## 2018-07-26 RX ADMIN — DIPHENHYDRAMINE HYDROCHLORIDE 50 MG: 50 CAPSULE ORAL at 18:00

## 2018-07-26 RX ADMIN — DIVALPROEX SODIUM 500 MG: 500 TABLET, DELAYED RELEASE ORAL at 20:57

## 2018-07-26 RX ADMIN — POTASSIUM CHLORIDE 40 MEQ: 750 TABLET, EXTENDED RELEASE ORAL at 03:46

## 2018-07-26 RX ADMIN — IBUPROFEN 400 MG: 400 TABLET ORAL at 10:56

## 2018-07-26 RX ADMIN — HALOPERIDOL 5 MG: 5 TABLET ORAL at 16:11

## 2018-07-26 RX ADMIN — WATER 10 MG: 1 INJECTION INTRAMUSCULAR; INTRAVENOUS; SUBCUTANEOUS at 04:27

## 2018-07-26 RX ADMIN — LORAZEPAM 2 MG: 2 TABLET ORAL at 16:11

## 2018-07-26 RX ADMIN — ZOLPIDEM TARTRATE 5 MG: 5 TABLET ORAL at 22:00

## 2018-07-26 NOTE — BH NOTES
PSYCHIATRIC PROGRESS NOTE         Patient Name  Tessa Peterson   Date of Birth 1945   [facility] [id number]   Medical Record Number  [id number]      Age  68 y.o. PCP None   Admit date:  7/25/2018    Room Number  742/01  @ [facility]   Date of Service  7/26/2018          PSYCHOTHERAPY SESSION NOTE:  Length of psychotherapy session: 45 minutes    Main condition/diagnosis/issues treated during session today, 7/26/2018 : kar, medications, coping skills     I employed Cognitive Behavioral therapy techniques, Reality-Oriented psychotherapy, as well as supportive psychotherapy in regards to various ongoing psychosocial stressors, including the following: pre-admission and current problems; medical issues; and stress of hospitalization. Interpersonal relationship issues and psychodynamic conflicts explored. Attempts made to alleviate maladaptive patterns. We, also, worked on issues of denial & effects of substance dependency/use     Overall, patient is not progressing    Treatment Plan Update (reviewed an updated 7/26/2018) : I will modify psychotherapy tx plan by implementing more stress management strategies, building upon cognitive behavioral techniques, increasing coping skills, as well as shoring up psychological defenses). An extended energy and skill set was needed to engage pt in psychotherapy due to some of the following: resistiveness, complexity, negativity, confrontational nature, hostile behaviors, and/or severe abnormalities in thought processes/psychosis resulting in the loss of expressive/receptive language communication skills. E & M PROGRESS NOTE:         HISTORY       CC:  \"kar\"  HISTORY OF PRESENT ILLNESS/INTERVAL HISTORY:  (reviewed/updated 7/26/2018). per initial evaluation:     Tessa Peterson presents/reports/evidences the following emotional symptoms today, 7/26/2018:kar. The above symptoms have been present for years.  These symptoms are of severe severity. The symptoms are constant  in nature. Additional symptomatology and features include anxiety. SIDE EFFECTS: (reviewed/updated 7/26/2018)  None reported or admitted to. No noted toxicity with use of Depakote/Tegretol/lithium/Clozaril/TCAs   ALLERGIES:(reviewed/updated 7/26/2018)  No Known Allergies   MEDICATIONS PRIOR TO ADMISSION:(reviewed/updated 7/26/2018)  Prescriptions Prior to Admission   Medication Sig    cholecalciferol (VITAMIN D3) 1,000 unit tablet Take 1,000 Units by mouth daily.  QUEtiapine (SEROQUEL) 100 mg tablet Take 100 mg by mouth nightly. Indications: Cinthya associated with Bipolar Disorder    metFORMIN (GLUCOPHAGE) 500 mg tablet Take  by mouth daily (with breakfast). Indications: type 2 diabetes mellitus    Omega-3 Fatty Acids (FISH OIL) 500 mg cap Take 1,000 mg by mouth. Indications: hypertriglyceridemia    lisinopril (PRINIVIL, ZESTRIL) 2.5 mg tablet Take 2.5 mg by mouth daily. Indications: hypertension      PAST MEDICAL HISTORY: Past medical history from the initial psychiatric evaluation has been reviewed (reviewed/updated 7/26/2018) with no additional updates (I asked patient and no additional past medical history provided). Past Medical History:   Diagnosis Date    Anxiety     Bipolar 1 disorder [facility])     son not sure where patient was diagnosed    [facility]     Hypertension    History reviewed. No pertinent surgical history. SOCIAL HISTORY: Social history from the initial psychiatric evaluation has been reviewed (reviewed/updated 7/26/2018) with no additional updates (I asked patient and no additional social history provided). Social History     Social History    Marital status:      Spouse name: N/A    Number of children: N/A    Years of education: N/A     Occupational History    Not on file.      Social History Main Topics    Smoking status: Never Smoker    Smokeless tobacco: Never Used    Alcohol use No    Drug use: Not on file    Sexual activity: Not on file     Other Topics Concern    Not on file     Social History Narrative    68year old  female admitted voluntarily for psyychotic behavior. Pt has paranoid delusions. She is widodef and lives alone. She was discharged [facility] days ago. FAMILY HISTORY: Family history from the initial psychiatric evaluation has been reviewed (reviewed/updated 7/26/2018) with no additional updates (I asked patient and no additional family history provided). History reviewed. No pertinent family history. REVIEW OF SYSTEMS: (reviewed/updated 7/26/2018)  Appetite:no change from normal   Sleep: no change   All other Review of Systems: Psychological ROS: positive for - concentration difficulties  Cardiovascular ROS: no chest pain or dyspnea on exertion  Gastrointestinal ROS: no abdominal pain, change in bowel habits, or black or bloody stools         [facility] [place]    [facility] FINDINGS ARE WITHIN NORMAL LIMITS (WNL) UNLESS OTHERWISE STATED BELOW. ( ALL OF THE BELOW CATEGORIES OF THE MSE HAVE BEEN REVIEWED (reviewed 7/26/2018) AND UPDATED AS DEEMED APPROPRIATE )  General Presentation age appropriate, cooperative   Orientation oriented to time, place and person   Vital Signs  See below (reviewed 7/26/2018); Vital Signs (BP, Pulse, & Temp) are within normal limits if not listed below.    Gait and Station Stable/steady, no ataxia   Musculoskeletal System No extrapyramidal symptoms (EPS); no abnormal muscular movements or Tardive Dyskinesia (TD); muscle strength and tone are within normal limits   Language No aphasia or dysarthria   Speech:  pressured   Thought Processes disorganized; fast rate of thoughts; poor abstract reasoning/computation   Thought Associations loose associations and tangential   Thought Content bizarre delusions   Suicidal Ideations none   Homicidal Ideations none   Mood:  irritable   Affect:  mood-congruent   Memory recent fair   Memory remote:  fair   Concentration/Attention:  distractable   Fund of Knowledge average   Insight:  limited   Reliability poor   Judgment:  limited          VITALS:     Patient Vitals for the past 24 hrs:   Temp Pulse Resp BP SpO2   07/26/18 1135 98.1 °F (36.7 °C) 85 16 136/76 -   07/26/18 0828 98.2 °F (36.8 °C) 73 16 95/62 96 %   07/26/18 0400 97.8 °F (36.6 °C) 85 18 145/65 100 %   07/26/18 0335 98.1 °F (36.7 °C) 84 18 122/70 99 %   07/26/18 0040 98.2 °F (36.8 °C) 85 18 124/79 99 %   07/25/18 2312 98.9 °F (37.2 °C) 88 16 127/75 98 %     Wt Readings from Last 3 Encounters:   07/25/18 69.9 kg (154 lb)   07/09/17 69.9 kg (154 lb 3.2 oz)     Temp Readings from Last 3 Encounters:   07/26/18 98.1 °F (36.7 °C)   07/09/17 98.6 °F (37 °C)     BP Readings from Last 3 Encounters:   07/26/18 136/76   07/09/17 190/90     Pulse Readings from Last 3 Encounters:   07/26/18 85   07/09/17 99            DATA     LABORATORY DATA:(reviewed/updated 7/26/2018)  Recent Results (from the past 24 hour(s))   CBC WITH AUTOMATED DIFF    Collection Time: 07/25/18 11:21 PM   Result Value Ref Range    WBC 5.7 3.6 - 11.0 K/uL    RBC 4.10 3.80 - 5.20 M/uL    HGB 12.7 11.5 - 16.0 g/dL    HCT 37.3 35.0 - 47.0 %    MCV 91.0 80.0 - 99.0 FL    MCH 31.0 26.0 - 34.0 PG    MCHC 34.0 30.0 - 36.5 g/dL    RDW 11.7 11.5 - 14.5 %    PLATELET 758 868 - 447 K/uL    MPV 9.8 8.9 - 12.9 FL    NRBC 0.0 0  WBC    ABSOLUTE NRBC 0.00 0.00 - 0.01 K/uL    NEUTROPHILS 57 32 - 75 %    LYMPHOCYTES 33 12 - 49 %    MONOCYTES 8 5 - 13 %    EOSINOPHILS 1 0 - 7 %    BASOPHILS 1 0 - 1 %    IMMATURE GRANULOCYTES 0 0.0 - 0.5 %    ABS. NEUTROPHILS 3.2 1.8 - 8.0 K/UL    ABS. LYMPHOCYTES 1.9 0.8 - 3.5 K/UL    ABS. MONOCYTES 0.5 0.0 - 1.0 K/UL    ABS. EOSINOPHILS 0.1 0.0 - 0.4 K/UL    ABS. BASOPHILS 0.0 0.0 - 0.1 K/UL    ABS. IMM.  GRANS. 0.0 0.00 - 0.04 K/UL    DF AUTOMATED     METABOLIC PANEL, COMPREHENSIVE    Collection Time: 07/25/18 11:21 PM   Result Value Ref Range Sodium 142 136 - 145 mmol/L    Potassium 3.0 (L) 3.5 - 5.1 mmol/L    Chloride 104 97 - 108 mmol/L    CO2 28 21 - 32 mmol/L    Anion gap 10 5 - 15 mmol/L    Glucose 196 (H) 65 - 100 mg/dL    BUN 14 6 - 20 MG/DL    Creatinine 0.61 0.55 - 1.02 MG/DL    BUN/Creatinine ratio 23 (H) 12 - 20      GFR est AA >60 >60 ml/min/1.73m2    GFR est non-AA >60 >60 ml/min/1.73m2    Calcium 9.0 8.5 - 10.1 MG/DL    Bilirubin, total 0.5 0.2 - 1.0 MG/DL    ALT (SGPT) 33 12 - 78 U/L    AST (SGOT) 20 15 - 37 U/L    Alk.  phosphatase 57 45 - 117 U/L    Protein, total 7.0 6.4 - 8.2 g/dL    Albumin 3.6 3.5 - 5.0 g/dL    Globulin 3.4 2.0 - 4.0 g/dL    A-G Ratio 1.1 1.1 - 2.2     ACETAMINOPHEN    Collection Time: 07/25/18 11:21 PM   Result Value Ref Range    Acetaminophen level <2 (L) 10 - 30 ug/mL   SALICYLATE    Collection Time: 07/25/18 11:21 PM   Result Value Ref Range    Salicylate level <5.4 (L) 2.8 - 20.0 MG/DL   ETHYL ALCOHOL    Collection Time: 07/25/18 11:21 PM   Result Value Ref Range    ALCOHOL(ETHYL),SERUM <10 <10 MG/DL   TSH 3RD GENERATION    Collection Time: 07/25/18 11:21 PM   Result Value Ref Range    TSH 1.04 0.36 - 3.74 uIU/mL   EKG, 12 LEAD, INITIAL    Collection Time: 07/26/18 12:19 AM   Result Value Ref Range    Ventricular Rate 84 BPM    Atrial Rate 84 BPM    P-R Interval 128 ms    QRS Duration 82 ms    Q-T Interval 422 ms    QTC Calculation (Bezet) 498 ms    Calculated P Axis 76 degrees    Calculated R Axis 25 degrees    Calculated T Axis 27 degrees    Diagnosis       Normal sinus rhythm  Nonspecific ST and T wave abnormality  Prolonged QT  No previous ECGs available  Confirmed by Wilbert Nick MD, J Carlos Hensley ([id number]) on 7/26/2018 8:51:32 AM     DRUG SCREEN, URINE    Collection Time: 07/26/18 12:32 AM   Result Value Ref Range    AMPHETAMINES NEGATIVE  NEG      BARBITURATES NEGATIVE  NEG      BENZODIAZEPINES NEGATIVE  NEG      COCAINE NEGATIVE  NEG      METHADONE NEGATIVE  NEG      OPIATES NEGATIVE  NEG      PCP(PHENCYCLIDINE) NEGATIVE  NEG      THC (TH-CANNABINOL) NEGATIVE  NEG      Drug screen comment (NOTE)    URINALYSIS W/MICROSCOPIC    Collection Time: 07/26/18 12:32 AM   Result Value Ref Range    Color YELLOW      Appearance CLEAR CLEAR      Specific gravity 1.007 1.003 - 1.030      pH (UA) 6.5 5.0 - 8.0      Protein NEGATIVE  NEG mg/dL    Glucose NEGATIVE  NEG mg/dL    Ketone NEGATIVE  NEG mg/dL    Bilirubin NEGATIVE  NEG      Blood MODERATE (A) NEG      Urobilinogen 0.2 0.2 - 1.0 EU/dL    Nitrites NEGATIVE  NEG      Leukocyte Esterase NEGATIVE  NEG      WBC 0-4 0 - 4 /hpf    RBC 0-5 0 - 5 /hpf    Epithelial cells FEW FEW /lpf    Bacteria NEGATIVE  NEG /hpf   URINE CULTURE HOLD SAMPLE    Collection Time: 07/26/18 12:32 AM   Result Value Ref Range    Urine culture hold        URINE ON HOLD IN MICROBIOLOGY DEPT FOR 3 DAYS. IF UNPRESERVED URINE IS SUBMITTED, IT CANNOT BE USED FOR ADDITIONAL TESTING AFTER 24 HRS, RECOLLECTION WILL BE REQUIRED. No results found for: VALF2, VALAC, VALP, VALPR, DS6, CRBAM, CRBAMP, CARB2, XCRBAM  No results found for: LITHM   RADIOLOGY REPORTS:(reviewed/updated 7/26/2018)  Ct Abd Pelv Wo Cont    Result Date: 7/26/2018  EXAM:  CT ABDOMEN PELVIS WITHOUT CONTRAST INDICATION:  LLQ pain COMPARISON: None. . TECHNIQUE: Unenhanced multislice helical CT was performed from the diaphragm to the symphysis pubis without intravenous contrast administration. Contiguous 5 mm axial images were reconstructed and lung and soft tissue windows were generated. Coronal and sagittal reformations were generated. CT dose reduction was achieved through use of a standardized protocol tailored for this examination and automatic exposure control for dose modulation. Mabeline Sink FINDINGS: INCIDENTALLY IMAGED CHEST: Heart/vessels: Within normal limits. Lungs/Pleura: Calcified nodule in the right lower lobe. . ABDOMEN: Liver: Calculus in the liver suggesting remote granulomatous disease. Gallbladder/Biliary: Status post cholecystectomy.  Spleen: Calculus in the spleen suggesting remote granulomatous disease. Pancreas: Pancreatic atrophy. There are a few calculi within lymph nodes at the jacquelyn hepatis Adrenals: Within normal limits. Kidneys: Punctate calculus in the interpolar region of the right kidney. Distended renal pelves, left greater than right Peritoneum/Mesenteries: Minimal perez appearance of the central mesentery. Extraperitoneum: Within normal limits. Gastrointestinal tract: Diverticulosis in the descending and sigmoid colon. Normal-appearing appendix Vascular: Calcifications in the aorta. Right gonadal vein phleboliths. Marilynne Ruts PELVIS: Extraperitoneum: The floor the pelvis suggesting phleboliths. Ureters: Within normal limits. Bladder: There is a locule of gas in the antidependent portion of the bladder. Reproductive System: Status post hysterectomy. . MSK: Extensive degenerative change throughout the visualized spine, nonfocal. Osteopenia. Tiny, fat-containing umbilical hernia . IMPRESSION: 1. There is a single locule of gas in the antidependent portion of the bladder. Recommend clinical correlation for recent instrumentation versus cystitis 2. There is diverticulosis in the descending and sigmoid colon; however, there is no evidence of diverticulitis. 3. Incidental findings as above.            MEDICATIONS     ALL MEDICATIONS:   Current Facility-Administered Medications   Medication Dose Route Frequency    OLANZapine (ZyPREXA) tablet 2.5 mg  2.5 mg Oral Q6H PRN    ziprasidone (GEODON) 10 mg in sterile water (preservative free) 0.5 mL injection  10 mg IntraMUSCular BID PRN    benztropine (COGENTIN) tablet 1 mg  1 mg Oral BID PRN    benztropine (COGENTIN) injection 1 mg  1 mg IntraMUSCular BID PRN    zolpidem (AMBIEN) tablet 5 mg  5 mg Oral QHS PRN    acetaminophen (TYLENOL) tablet 650 mg  650 mg Oral Q4H PRN    ibuprofen (MOTRIN) tablet 400 mg  400 mg Oral Q8H PRN    magnesium hydroxide (MILK OF MAGNESIA) 400 mg/5 mL oral suspension 30 mL 30 mL Oral DAILY PRN    nicotine (NICODERM CQ) 21 mg/24 hr patch 1 Patch  1 Patch TransDERmal DAILY PRN    miconazole (SECURA) 2 % extra thick cream   Topical BID      SCHEDULED MEDICATIONS:   Current Facility-Administered Medications   Medication Dose Route Frequency    miconazole (SECURA) 2 % extra thick cream   Topical BID          ASSESSMENT & PLAN     DIAGNOSES REQUIRING ACTIVE TREATMENT AND MONITORING: (reviewed/updated 7/26/2018)  Patient Active Hospital Problem List:   [facility] (7/26/2018)    Assessment: kar alternating with depression    Plan: mood stabilizer and antispychotic            I will continue to monitor blood levels (Depakote, Tegretol, lithium, clozapine---a drug with a narrow therapeutic index= NTI) and associated labs for drug therapy implemented that require intense monitoring for toxicity as deemed appropriate based on current medication side effects and pharmacodynamically determined drug 1/2 lives. In summary, Kain Sandoval, is a 68 y.o.  female who presents with a severe exacerbation of the principal diagnosis of <principal problem not specified>  Patient's condition is worsening/not improving/not stable . Patient requires continued inpatient hospitalization for further stabilization, safety monitoring and medication management. I will continue to coordinate the provision of individual, milieu, occupational, group, and substance abuse therapies to address target symptoms/diagnoses as deemed appropriate for the individual patient. A coordinated, multidisplinary treatment team round was conducted with the patient (this team consists of the nurse, psychiatric unit pharmcist,  and writer). Complete current electronic health record for patient has been reviewed today including consultant notes, ancillary staff notes, nurses and psychiatric tech notes. Suicide risk assessment completed and patient deemed to be of low risk for suicide at this time. The following regarding medications was addressed during rounds with patient:   the risks and benefits of the proposed medication. The patient was given the opportunity to ask questions. Informed consent given to the use of the above medications. Will continue to adjust psychiatric and non-psychiatric medications (see above \"medication\" section and orders section for details) as deemed appropriate & based upon diagnoses and response to treatment. I will continue to order blood tests/labs and diagnostic tests as deemed appropriate and review results as they become available (see orders for details and above listed lab/test results). I will order psychiatric records from previous [facility] hospitals to further elucidate the nature of patient's psychopathology and review once available. I will gather additional collateral information from friends, family and o/p treatment team to further elucidate the nature of patient's psychopathology and baselline level of psychiatric functioning. I certify that this patient's inpatient psychiatric hospital services furnished since the previous certification were, and continue to be, required for treatment that could reasonably be expected to improve the patient's condition, or for diagnostic study, and that the patient continues to need, on a daily basis, active treatment furnished directly by or requiring the supervision of inpatient psychiatric facility personnel. In addition, the hospital records show that services furnished were intensive treatment services, admission or related services, or equivalent services.     EXPECTED DISCHARGE DATE/DAY: TBD     DISPOSITION: Home       Signed By:   Luis Fernando Tolliver MD  7/26/2018

## 2018-07-26 NOTE — DIABETES MGMT
DTC Consult Note Recommendations/ Comments: Consult noted for hospital glucose management. Only one blood sugar available for review (196 mg/dL from last night). If appropriate, please consider: 
 - obtaining an A1C 
- adding ac and hs accu checks with Lispro correction, normal sensitivity 
- adding Metformin 500 mg with breakfast 
Discussed above with patient's nurse who plans to mention to MD. 
 
Current hospital DM medication: None Chart reviewed on [facility]. Patient is a 68 y.o. female with a history of Type 2 Diabetes on oral agent (monotherapy): metformin (generic) at home. A1c:  
No results found for: HBA1C, HGBE8, BAA0CIKW Recent Glucose Results:  
Lab Results Component Value Date/Time  (H) 07/25/2018 11:21 PM  
  
 
Lab Results Component Value Date/Time Creatinine 0.61 07/25/2018 11:21 PM  
 
Estimated Creatinine Clearance: 80.7 mL/min (based on Cr of 0.61). Active Orders Diet DIET DIABETIC WITH OPTIONS Consistent Carb 2000kcal; Regular PO intake: No data found. Will continue to follow as needed. Thank you Samir Howell, MS, RN, CDE

## 2018-07-26 NOTE — BH NOTES
1515-received patient at change of shift, inappropriate boundaries with male peer, pt redirected. Pt hyper verbal, intrusive, labile, with flight of ideas. Pt puts clothing over head in milieu and begans making exaggerated arm movements citing \"I am mediating\" . Decreased stimuli and redirected patient as she was a source of irritation to peers in milieu, pt increasingly agitated, labile and loose in conversation.  Inappropriately attempting to touch male peer  1550-Dr. Marlow informed of above behaviors, orders for prn haldol, benadryl, and ativan received   1630-pt remains intrusive, tangential, loud pressured speech  1850-pt assisted to room, toileted voided large amount of urine  2000-VSS, pt continues to be hyperverbal, loose associatio

## 2018-07-26 NOTE — BH NOTES
PRN Medication Documentation    Specific patient behavior that led to need for PRN medication: lower back pain  Staff interventions attempted prior to PRN being given: rest  PRN medication given: Ibuprofen 400 mg po PRN.   Patient response/effectiveness of PRN medication: 11:40 am-goal met for pain

## 2018-07-26 NOTE — BH NOTES
8034 Primary Nurse Valeriano De La Garza RN and Cecelia Palacio RN performed a dual skin assessment on this patient: Pt has excoriation to gluteal fold and lower sacrum, looks like peeling skin. Scattered bruises to abdomen.   Jesus score is 20

## 2018-07-26 NOTE — BSMART NOTE
Comprehensive Assessment Form Part 1      Section I - Disposition    Axis I - Psychosis                Bipolar Disorder   Axis II - Deferred  Axis III -   Past Medical History Date Comments      Hypertension [I10]       Diabetes ([facility] [address] [E11.9]       Anxiety [F41.9]       Bipolar 1 disorder ([facility] [address] [F31.9]         Axis IV - Lives alone as reported, no reported stressors  Axis V -       The Medical Doctor to Psychiatrist conference was not completed. The Medical Doctor is in agreement with Psychiatrist disposition because of (reason) patient is voluntary admission. The plan is admit to [facility] Bed 1 . The on-call Psychiatrist consulted was Dr. Bee Rizo. The admitting Psychiatrist will be Dr. Laura Coy. The admitting Diagnosis is Psychosis, Bipolar Disorder. The Payor source is [employer]. The name of the representative was . This was approved for  days. The authorization number is . Section II - Integrated Summary  Summary:  Patient is 68year old male reporting to ED, Patient arrives via ems after barricading herself in her apartment and breaking plates. Patient was discharged from [facility] today. Patient is manic with flight of ideas in triage. At bedside, patient presents with flight of ideas, pressured speech. Patient denied suicidal, homicidal thoughts, denied hallucinations but then stated I think I was because I am in the hospital. When asked why she is in ED, she began talking about her neighbors, and stated they are convinced she did something. Patient reported they told her to get away from there home and not to come back. Patient then began discussing her son stated she got pink eye from the furniture and her son. When asked about her children again she stated she had a daughter but did not want to call him her son now because he smokes weeds and things.  Patient reported her neighbors believe she has been disrupting the [facility] [employer]. Patient discussed filing charges on them soon as she gets out. Patient continued to discussed random things such as hospitals she has been in and things that have been done. Patient reported that she has not ate 15 meals,  Patient was cooperative with , patient very affectionate telling staff she loves them and they are nice. The patient has demonstrated mental capacity to provide informed consent. The information is given by the patient. The Chief Complaint is flight of ideas, reported barricading self in home,. Breaking plates, . The Precipitant Factors are mental health. Previous Hospitalizations: yes  The patient has not previously been in restraints. Current Psychiatrist and/or  is unknown. Lethality Assessment:    The potential for suicide noted by the following: not noted . The potential for homicide is not noted. The patient has not been a perpetrator of sexual or physical abuse. There are not pending charges. The patient is not felt to be at risk for self harm or harm to others. The attending nurse was advised not noted. Section III - Psychosocial  The patient's overall mood and attitude is cooperative. Feelings of helplessness and hopelessness are not observed. Generalized anxiety is not observed. Panic is not observed. Phobias are not observed. Obsessive compulsive tendencies are not observed. Section IV - Mental Status Exam  The patient's appearance is unkempt. The patient's behavior is manic . The patient is only aware of  time, place, person and situation. The patient's speech is pressured. The patient's mood is euthymic. The range of affect shows no evidence of impairment. The patient's thought content demonstrates no evidence of impairment. The thought process shows a flight of ideas. The patient's perception shows no evidence of impairment. The patient's memory shows no evidence of impairment.   The patient's appetite shows no evidence of impairment. The patient's sleep shows no evidence of impairment. The patient's insight shows no evidence of impairment. The patient's judgement shows no evidence of impairment. Section V - Substance Abuse  The patient is not using substances. The patient is using not noted. The patient has experienced the following withdrawal symptoms: N/A. Section VI - Living Arrangements  The patient is single. The patient lives alone. The patient has 2 children ages adult. The patient does plan to return home upon discharge. The patient does not have legal issues pending. The patient's source of income comes from disability and social security. Congregation and cultural practices have not been voiced at this time. The patient's greatest support comes from no one reported and this person will not be involved with the treatment. The patient has not been in an event described as horrible or outside the realm of ordinary life experience either currently or in the past.  The patient has not been a victim of sexual/physical abuse. Section VII - Other Areas of Clinical Concern  The highest grade achieved is unknown with the overall quality of school experience being described as unknown. The patient is currently unemployed and speaks [place] as a primary language. The patient has no communication impairments affecting communication. The patient's preference for learning can be described as: can read and write adequately.   The patient's hearing is normal.  The patient's vision is normal.      Paulina Pate

## 2018-07-26 NOTE — WOUND CARE
WOCN Note:     New consult placed by RN for excoriation/peeling gluteal cleft. Chart shows:  Admitted for psychosis; history of bipolar    Assessment:   Patient is communicative, continent and mobile - walked independently from day room to bed room for assessment using a cane. Patient reports generalized pain while walking. 1. POA, gluteal cleft erythematous rash with satellite lesions consistent with candidiasis. Some dry, lacy desquamation to border. Recommendations:    Secura Extrathick Antifungal Cream to buttocks/gluteal cleft twice daily. Discussed above plan with patient & RN.     Transition of Care: Plan to follow weekly and as needed while admitted to hospital.      JERSEY GreeneN, RN, [facility]  Certified Wound, Ostomy, Continence Nurse  office [phone]  pager [phone] or call  to page

## 2018-07-26 NOTE — BH NOTES
PSYCHOSOCIAL ASSESSMENT  :Patient identifying info: Josiah Kinney is a 68 y.o., female admitted 2018 11:00 PM     Presenting problem and precipitating factors: Patient was brought to [facility] [place] ED via EMS after her neighbors called 911 due to Pt barricading herself in her apartment and breaking dishes. Pt was released from the inpatient psychiatric unit at [facility] 1 day ago. Pt presents with flight of ideas, tangential speech, hyperverbal, and with poor insight and judgement. Mental status assessment: Manic, poor judgement, hyperverbal    Current psychiatric providers and contact info: Unknown    Previous psychiatric services/providers and response to treatment:  Multiple previous inpatient psychiatric hospitalizations ([facility] last week; Family history of mental illness:  Unknown    Substance abuse history:  None  Social History   Substance Use Topics    Smoking status: Never Smoker    Smokeless tobacco: Never Used    Alcohol use No       Family constellation:  2 adult children    Is significant other involved? No, /; states she has a boyfriend who is a [employer] from the [employer]      Describe support system:  Children    Describe living arrangements and home environment:  Lives alone.   Health issues:   Hospital Problems  Never Reviewed          Codes Class Noted POA    Psychosis ICD-10-CM: F29  ICD-9-CM: 298.9  2018 Unknown              Trauma history: None indicated    Legal issues:  None indicated    History of  service:   spouse    Financial status:  Income from [facility]     Islam/cultural factors:  None indicated    Education/work history: Unemployed    Have you been licensed as a driss care professional (current or ): No  Leisure and recreation preferences: Spending time with family  Describe coping skills: Limited    Loi Simmons  2018

## 2018-07-26 NOTE — BH NOTES
TRANSFER - IN REPORT:    Verbal report received from [facility] RN(name) on Simi Turner  being received from ED(unit) for routine progression of care      Report consisted of patients Situation, Background, Assessment and   Recommendations(SBAR). Information from the following report(s) SBAR, Kardex and ED Summary was reviewed with the receiving nurse. Opportunity for questions and clarification was provided. Assessment completed upon patients arrival to unit and care assumed.

## 2018-07-26 NOTE — BH NOTES
PRN Medication Documentation    Specific patient behavior that led to need for PRN medication: manic like behav,disruptive towards group,anxietyStaff interventions attempted prior to PRN being given: redirection, coping skills  PRN medication given: ativan,haldol,benadryl  Patient response/effectiveness of PRN medication:tl aware,instructed to give

## 2018-07-26 NOTE — PROGRESS NOTES
[facility]  Master Treatment Plan for Luciana Soto    Date Treatment Plan Initiated: 7/26/18    Treatment Plan Modalities:  Type of Modality Amount  (x minutes) Frequency (x/week) Duration (x days) Name of Responsible Staff   [facility] meetings to encourage peer interactions 15 7 1 Refugio S     Group psychotherapy to assist in building coping skills and internal controls 60 7 1 Rafa Brewer   Therapeutic activity groups to build coping skills 60 7 1 Rafa Brewer   Psychoeducation in group setting to address:   Medication education   15 7 1 [facility] [place] skills         Relaxation techniques         Symptom management         Discharge planning   60 2 Magalis Colingeo 115   60 1 1 volunteer   Recovery/AA/NA      volunteer   Physician medication management   13 7 1 Dr. Olman Kelly meeting/discharge planning   15 2 [facility]                                          Problem: Falls - Risk of these goals will be met by 8/2/18  Goal: *Absence of Falls  Document Yobany Fall Risk and appropriate interventions in the flowsheet.    Outcome: Progressing Towards Goal  Fall Risk Interventions:  Mobility Interventions: Patient to call before getting OOB, Bed/chair exit alarm, Utilize walker, cane, or other assistive device    Mentation Interventions: Adequate sleep, hydration, pain control, Bed/chair exit alarm, Door open when patient unattended, Room close to nurse's station    Medication Interventions: Teach patient to arise slowly, Patient to call before getting OOB, Bed/chair exit alarm    Elimination Interventions: Bed/chair exit alarm, Call light in reach, Patient to call for help with toileting needs, Toilet paper/wipes in reach             Problem: Altered Thought Process (Adult/Pediatric) these  Goals will be met by 8/4/18  Goal: *STG: Participates in treatment plan  Outcome: Progressing Towards Goal  Out on unit engaged with peers and staff. Mood and affect bright deflective and minimizing. Daily goal is to return home  Goal: *STG: Seeks staff when feelings of anxiety and fear arise  Outcome: Progressing Towards Goal  When asked defective and having difficulty answering questions about her mood and emotions  Goal: *STG: Complies with medication therapy  Outcome: Progressing Towards Goal  compliant  Goal: *STG: Attends activities and groups  Outcome: Progressing Towards Goal  Semi engaged  Goal: *STG: Demonstrates ability to understand and use improved judgment in daily activities and relationships  Outcome: Progressing Towards Goal  Flight of ideas, tangential able to get her needs met  Goal: Interventions  Outcome: Progressing Towards Goal  Staff focus is on offering support, reassurance, medication and coping skills education    : when discussing benefits of Depakote pt immediately declined and voiced some paranoid delusions about it being recalled and dangerous. Poor historian offering conflicting history. 1122: agrees to stay inpatient today and accept medications that are ordered.

## 2018-07-26 NOTE — ED NOTES
Bedside shift change report given to Deanna Akhtar (oncoming nurse) by Julee Perrin (offgoing nurse). Report included the following information SBAR.     0100 Patient placed on bedpan voided 500ml.    0335 TRANSFER - OUT REPORT:    Verbal report given to Angy(name) on Al Mcmahon  being transferred to (unit) for routine progression of care       Report consisted of patients Situation, Background, Assessment and   Recommendations(SBAR). Information from the following report(s) SBAR was reviewed with the receiving nurse. Lines:   Peripheral IV 07/25/18 Right Antecubital (Active)   Site Assessment Clean, dry, & intact 7/25/2018 11:25 PM   Phlebitis Assessment 0 7/25/2018 11:25 PM   Infiltration Assessment 0 7/25/2018 11:25 PM   Dressing Status Clean, dry, & intact 7/25/2018 11:25 PM   Dressing Type Transparent 7/25/2018 11:25 PM   Hub Color/Line Status Pink 7/25/2018 11:25 PM   Action Taken Blood drawn 7/25/2018 11:25 PM        Opportunity for questions and clarification was provided.       Patient transported with:   Registered Nurse Police

## 2018-07-26 NOTE — BH NOTES
GROUP THERAPY PROGRESS NOTE    Gasper Burden did not participate in a 45 minute Morning Process Group on the Geriatric Unit, with a focus identifying feelings, planning for the day, and singing.

## 2018-07-26 NOTE — ED TRIAGE NOTES
Patient arrives via ems after barricading herself in her apartment and breaking plates. Patient was discharged from [facility] today. Patient is manic with flight of ideas in triage.

## 2018-07-26 NOTE — ED PROVIDER NOTES
HPI Comments: 68 y.o. female with past medical history significant for HTN, DM, anxiety, Bipolar 1 disorder, who presents via EMS for evaluation of a mental health problem. Patient was just admitted a psychiatric facility, [facility]. Patient was discharged earlier today, but when she got home she started breaking glass plates and barricading herself in her apartment. EMS was called to bring patient to the ED for evaluation of her inappropriate behavior. It is unclear who called EMS, but patient herself is unable to say why she is here. Pt has multiple complaints in the ED, including LLQ abdominal pain and bilateral foot swelling. She states \"I think I have a blood disorder that hasn't been diagnosed because I have all this bruising\". Also states \"I think I have an infection going on, but just put me in ice water and I'll get better\". Overall, patient appears manic and exhibits flight of ideas during the initial interview. History is limited as a result. Social hx: Negative for Tobacco use; Negative for EtOH use   PCP: None    Full history, physical exam, and ROS unable to be obtained due to:  psychosis. Note written by Tulio Strickland. Refugio Garcia, as dictated by Rosie Clements MD 11:14 PM     The history is provided by the patient. The history is limited by the condition of the patient. No  was used. Past Medical History:   Diagnosis Date    Anxiety     Bipolar 1 disorder [facility])     son not sure where patient was diagnosed    Diabetes ([facility] [address]     Hypertension        History reviewed. No pertinent surgical history. History reviewed. No pertinent family history. Social History     Social History    Marital status:      Spouse name: N/A    Number of children: N/A    Years of education: N/A     Occupational History    Not on file.      Social History Main Topics    Smoking status: Never Smoker    Smokeless tobacco: Never Used    Alcohol use No    Drug use: Not on file    Sexual activity: Not on file     Other Topics Concern    Not on file     Social History Narrative         ALLERGIES: Review of patient's allergies indicates no known allergies. Review of Systems   Unable to perform ROS: Psychiatric disorder       Vitals:    07/25/18 2312 07/26/18 0040   BP: 127/75 124/79   Pulse: 88 85   Resp: 16 18   Temp: 98.9 °F (37.2 °C) 98.2 °F (36.8 °C)   SpO2: 98% 99%   Weight: 69.9 kg (154 lb)    Height: 5' 5\" (1.651 m)             Physical Exam   Nursing note and vitals reviewed. CONSTITUTIONAL: Well-appearing; well-nourished; in mild distress  HEAD: Normocephalic; atraumatic  EYES: PERRL; EOM intact; conjunctiva and sclera are clear bilaterally. ENT: No rhinorrhea; normal pharynx with no tonsillar hypertrophy; mucous membranes pink/moist, no erythema, no exudate. NECK: Supple; non-tender; no cervical lymphadenopathy  CARD: Normal S1, S2; no murmurs, rubs, or gallops. Regular rate and rhythm. RESP: Normal respiratory effort; breath sounds clear and equal bilaterally; no wheezes, rhonchi, or rales. ABD: Normal bowel sounds; non-distended; LLQ tenderness; no rebound or guarding; no palpable organomegaly, no masses, no bruits. Back Exam: Normal inspection; no vertebral point tenderness, no CVA tenderness. Normal range of motion. EXT: Normal ROM in all four extremities; non-tender to palpation; no swelling or deformity; distal pulses are normal, no edema. SKIN: Warm; dry; no rash. NEURO:Alert and oriented x 3, coherent, JOSE-XII grossly intact, sensory and motor are non-focal.        MDM  Number of Diagnoses or Management Options  Altered mental status, unspecified altered mental status type:   Hypokalemia:   Psychosis, unspecified psychosis type:   Diagnosis management comments: Assessment: Psychosis/ left lower quadrant abdominal pain, rule out diverticular disease, kidney stone, obstipation, myofascial strain.  The patient is hemodynamically stable at this time. Plan: Lab/ IV fluid/ EKG/ chest x-ray/ BSMRT Consult for psychiatric evaluation and treatment, when the patient's medically clear. serial exam/ monitor and reevaluate. Amount and/or Complexity of Data Reviewed  Clinical lab tests: ordered and reviewed  Tests in the radiology section of CPT®: ordered and reviewed  Tests in the medicine section of CPT®: reviewed and ordered  Discussion of test results with the performing providers: yes  Decide to obtain previous medical records or to obtain history from someone other than the patient: yes  Obtain history from someone other than the patient: yes  Review and summarize past medical records: yes  Discuss the patient with other providers: yes  Independent visualization of images, tracings, or specimens: yes    Risk of Complications, Morbidity, and/or Mortality  Presenting problems: moderate  Diagnostic procedures: moderate  Management options: moderate          ED Course       Procedures     ED EKG interpretation:  Rhythm: normal sinus rhythm; and regular . Rate (approx.): 84; Axis: normal; P wave: normal; QRS interval: normal ; ST/T wave: normal; in  Lead: Diffusely; Other findings: abnormal ekg. This EKG was interpreted by Anupam Stewart MD,ED Provider. PROGRESS NOTE:  1:47 AM  Patient is medically cleared. BSMART has evaluated the patient and has spoken with psychiatry. Currently looking for psychiatric placement. PROGRESS NOTE:  2:37 AM  Patient will be admitted to psychiatric floor of [facility] [place]. PROGRESS NOTE:  Pt has been reexamined by Anupam Stewart MD all available results have been reviewed with pt and any available family. Pt understands sx, dx, and tx in ED. Care plan has been outlined and questions have been answered. Pt and any available family understands and agrees to need for admission to hospital for further tx not available in ED. Pt is ready for admission.     Written by Anupam Stewart MD,  3:00 AM    .

## 2018-07-26 NOTE — CONSULTS
Pt admitted through [facility] emergency room yesterday, admitted to psychiatry services. Hospitalists were consulted to adjust/add sliding scale insulin. Brief review of chart indicates pt has a hx of diabetes and on metformin at home. DTC has seen the pt, recommended adding back her daily metformin, adding SSI and checking a HgbA1c.    - daily metformin already ordered  - HgbA1c 6.3  - will order normal sensitivity SSI in addition to accuchecks     Thank you for giving us opportunity to participate in this patients care.  Will sign off at this time, please re-consult if there are any further medical management needs or questions

## 2018-07-26 NOTE — BH NOTES
0400 ADMISSION    Pt is voluntarily admitted with kar - police was called to house (unknown by whom) for pt \"breaking glass and barricading self in apartment. \" Pt was discharged earlier in day from [facility] for paranoid delusions according to report. Pt is a very poor historian (giving contradicting information). Pt denies SI, HI, and AVH - no evidence of pt responding to internal stimuli. States she hasn't slept in 20 days. Oriented to self and place only. Pt presents with flight of ideas and tangential thought process, hypervebal, labile, argumentative and uncooperative at times. Ambulates with cane and has unsteady gait from BLE weakness. Pt voided in bathroom and is currently continent, though gluteal fold is excoriated with peeling skin. Pt refusing to wear underwear or brief. Did not want to wear gown but eventually complied. UDS and BAL negative. PTA medications not verified. Pt unable to express which medications she takes or which pharmacy she goes to.      0427 PRN Medication Documentation    Specific patient behavior that led to need for PRN medication: impulsive, poor physical boundaries and intrusive (touching staff), hyperverbal, racing thoughts  Staff interventions attempted prior to PRN being given: verbal redirection, distraction, snack, sitting with pt, therapeutic listening, offering PO - pt requested \"a shot\"   PRN medication given: Geodon 10 mg in R deltoid, pt compliant   Patient response/effectiveness of PRN medication: 9373 Pt resting quietly in bed. Respirations even and unlabored. Bed alarm on, call bell within reach. Will monitor with Q 15 safety checks. 0600 Pt appeared to sleep 30 + minutes. PTA medications verified by discharge paperwork from [facility].

## 2018-07-26 NOTE — H&P
INITIAL PSYCHIATRIC EVALUATION            IDENTIFICATION:    Patient Name  Myrna Ellison   Date of Birth 1945   [facility] [id number]   Medical Record Number  [id number]      Age  68 y.o. PCP None   Admit date:  7/25/2018    Room Number  742/01  @ [facility]   Date of Service  7/26/2018            HISTORY         REASON FOR HOSPITALIZATION:  CC psychosis/cinthya \". Pt admitted under a voluntary basis for severe psychosis and cinthya proving to be an imminent danger to self and others and an inability to care for self. HISTORY OF PRESENT ILLNESS:    The patient, Myrna Ellison, is a 68 y.o. WHITE [place]  female with a past psychiatric history significant for bipolar disorder , who presents at this time with complaints of (and/or evidence of) the following emotional symptoms: cinthya. Additional symptomatology include agitation. The above symptoms have been present for years. These symptoms are of severe severity. These symptoms are constant  in nature. The patient's condition has been precipitated by medication non complianceand psychosocial stressors (arguing with beighbors  ). Patient's condition made worse by  treatment noncompliance. UDS: +negative; BAL=0. ALLERGIES: No Known Allergies   MEDICATIONS PRIOR TO ADMISSION:   Prescriptions Prior to Admission   Medication Sig    cholecalciferol (VITAMIN D3) 1,000 unit tablet Take 1,000 Units by mouth daily.  QUEtiapine (SEROQUEL) 100 mg tablet Take 100 mg by mouth nightly. Indications: Cinthya associated with Bipolar Disorder    metFORMIN (GLUCOPHAGE) 500 mg tablet Take  by mouth daily (with breakfast). Indications: type 2 diabetes mellitus    Omega-3 Fatty Acids (FISH OIL) 500 mg cap Take 1,000 mg by mouth. Indications: hypertriglyceridemia    lisinopril (PRINIVIL, ZESTRIL) 2.5 mg tablet Take 2.5 mg by mouth daily.  Indications: hypertension      PAST MEDICAL HISTORY:   Past Medical History:   Diagnosis Date    Anxiety     Bipolar 1 disorder [facility])     son not sure where patient was diagnosed    Diabetes ([facility] [address]     Hypertension    History reviewed. No pertinent surgical history. SOCIAL HISTORY:    Social History     Social History    Marital status:      Spouse name: N/A    Number of children: N/A    Years of education: N/A     Occupational History    Not on file. Social History Main Topics    Smoking status: Never Smoker    Smokeless tobacco: Never Used    Alcohol use No    Drug use: Not on file    Sexual activity: Not on file     Other Topics Concern    Not on file     Social History Narrative    68year old  female admitted voluntarily for psyychotic behavior. Pt has paranoid delusions. She is widodef and lives alone. She was discharged from [facility] 3 days ago. FAMILY HISTORY:    History reviewed. No pertinent family history. REVIEW OF SYSTEMS:   Psychological ROS: positive for - concentration difficulties  Respiratory ROS: no cough, shortness of breath, or wheezing  Cardiovascular ROS: no chest pain or dyspnea on exertion  Pertinent items are noted in the History of Present Illness. All other Systems reviewed and are considered negative. MENTAL STATUS EXAM & VITALS     MENTAL STATUS EXAM (MSE):    MSE FINDINGS ARE WITHIN NORMAL LIMITS (WNL) UNLESS OTHERWISE STATED BELOW. ( ALL OF THE BELOW CATEGORIES OF THE MSE HAVE BEEN REVIEWED (reviewed 7/26/2018) AND UPDATED AS DEEMED APPROPRIATE )  General Presentation age appropriate, cooperative   Orientation oriented to time, place and person   Vital Signs  See below (reviewed 7/26/2018); Vital Signs (BP, Pulse, & Temp) are within normal limits if not listed below.    Gait and Station Stable/steady, no ataxia   Musculoskeletal System No extrapyramidal symptoms (EPS); no abnormal muscular movements or Tardive Dyskinesia (TD); muscle strength and tone are within normal limits   Language No aphasia or dysarthria   Speech:  hyperverbal and pressured   Thought Processes disorganized; fast rate of thoughts; poor abstract reasoning/computation   Thought Associations loose associations   Thought Content grandiose delusions   Suicidal Ideations none   Homicidal Ideations none   Mood:  euphoric and irritable   Affect:  mood-congruent   Memory recent  impaired   Memory remote:  impaired   Concentration/Attention:  distractable   Fund of Knowledge below average   Insight:  poor   Reliability poor   Judgment:  poor          VITALS:     Patient Vitals for the past 24 hrs:   Temp Pulse Resp BP SpO2   07/26/18 0828 98.2 °F (36.8 °C) 73 16 95/62 96 %   07/26/18 0400 97.8 °F (36.6 °C) 85 18 145/65 100 %   07/26/18 0335 98.1 °F (36.7 °C) 84 18 122/70 99 %   07/26/18 0040 98.2 °F (36.8 °C) 85 18 124/79 99 %   07/25/18 2312 98.9 °F (37.2 °C) 88 16 127/75 98 %     Wt Readings from Last 3 Encounters:   07/25/18 69.9 kg (154 lb)   07/09/17 69.9 kg (154 lb 3.2 oz)     Temp Readings from Last 3 Encounters:   07/26/18 98.2 °F (36.8 °C)   07/09/17 98.6 °F (37 °C)     BP Readings from Last 3 Encounters:   07/26/18 95/62   07/09/17 190/90     Pulse Readings from Last 3 Encounters:   07/26/18 73   07/09/17 99            DATA     LABORATORY DATA:  Labs Reviewed   METABOLIC PANEL, COMPREHENSIVE - Abnormal; Notable for the following:        Result Value    Potassium 3.0 (*)     Glucose 196 (*)     BUN/Creatinine ratio 23 (*)     All other components within normal limits   ACETAMINOPHEN - Abnormal; Notable for the following:     Acetaminophen level <2 (*)     All other components within normal limits   SALICYLATE - Abnormal; Notable for the following:     Salicylate level <4.7 (*)     All other components within normal limits   URINALYSIS W/MICROSCOPIC - Abnormal; Notable for the following:     Blood MODERATE (*)     All other components within normal limits   URINE CULTURE HOLD SAMPLE   CBC WITH AUTOMATED DIFF   SAMPLES BEING HELD   DRUG SCREEN, URINE   ETHYL ALCOHOL   TSH 3RD GENERATION     Admission on 07/25/2018   Component Date Value Ref Range Status    WBC 07/25/2018 5.7  3.6 - 11.0 K/uL Final    RBC 07/25/2018 4.10  3.80 - 5.20 M/uL Final    HGB 07/25/2018 12.7  11.5 - 16.0 g/dL Final    HCT 07/25/2018 37.3  35.0 - 47.0 % Final    MCV 07/25/2018 91.0  80.0 - 99.0 FL Final    MCH 07/25/2018 31.0  26.0 - 34.0 PG Final    MCHC 07/25/2018 34.0  30.0 - 36.5 g/dL Final    RDW 07/25/2018 11.7  11.5 - 14.5 % Final    PLATELET 13/45/2655 625  150 - 400 K/uL Final    MPV 07/25/2018 9.8  8.9 - 12.9 FL Final    NRBC 07/25/2018 0.0  0  WBC Final    ABSOLUTE NRBC 07/25/2018 0.00  0.00 - 0.01 K/uL Final    NEUTROPHILS 07/25/2018 57  32 - 75 % Final    LYMPHOCYTES 07/25/2018 33  12 - 49 % Final    MONOCYTES 07/25/2018 8  5 - 13 % Final    EOSINOPHILS 07/25/2018 1  0 - 7 % Final    BASOPHILS 07/25/2018 1  0 - 1 % Final    IMMATURE GRANULOCYTES 07/25/2018 0  0.0 - 0.5 % Final    ABS. NEUTROPHILS 07/25/2018 3.2  1.8 - 8.0 K/UL Final    ABS. LYMPHOCYTES 07/25/2018 1.9  0.8 - 3.5 K/UL Final    ABS. MONOCYTES 07/25/2018 0.5  0.0 - 1.0 K/UL Final    ABS. EOSINOPHILS 07/25/2018 0.1  0.0 - 0.4 K/UL Final    ABS. BASOPHILS 07/25/2018 0.0  0.0 - 0.1 K/UL Final    ABS. IMM.  GRANS. 07/25/2018 0.0  0.00 - 0.04 K/UL Final    DF 07/25/2018 AUTOMATED    Final    Sodium 07/25/2018 142  136 - 145 mmol/L Final    Potassium 07/25/2018 3.0* 3.5 - 5.1 mmol/L Final    Chloride 07/25/2018 104  97 - 108 mmol/L Final    CO2 07/25/2018 28  21 - 32 mmol/L Final    Anion gap 07/25/2018 10  5 - 15 mmol/L Final    Glucose 07/25/2018 196* 65 - 100 mg/dL Final    BUN 07/25/2018 14  6 - 20 MG/DL Final    Creatinine 07/25/2018 0.61  0.55 - 1.02 MG/DL Final    BUN/Creatinine ratio 07/25/2018 23* 12 - 20   Final    GFR est AA 07/25/2018 >60  >60 ml/min/1.73m2 Final    GFR est non-AA 07/25/2018 >60  >60 ml/min/1.73m2 Final    Calcium 07/25/2018 9.0  8.5 - 10.1 MG/DL Final    Bilirubin, total 07/25/2018 0.5  0.2 - 1.0 MG/DL Final    ALT (SGPT) 07/25/2018 33  12 - 78 U/L Final    AST (SGOT) 07/25/2018 20  15 - 37 U/L Final    Alk. phosphatase 07/25/2018 57  45 - 117 U/L Final    Protein, total 07/25/2018 7.0  6.4 - 8.2 g/dL Final    Albumin 07/25/2018 3.6  3.5 - 5.0 g/dL Final    Globulin 07/25/2018 3.4  2.0 - 4.0 g/dL Final    A-G Ratio 07/25/2018 1.1  1.1 - 2.2   Final    Acetaminophen level 07/25/2018 <2* 10 - 30 ug/mL Final    Salicylate level 79/50/2842 <1.7* 2.8 - 20.0 MG/DL Final    AMPHETAMINES 07/26/2018 NEGATIVE   NEG   Final    BARBITURATES 07/26/2018 NEGATIVE   NEG   Final    BENZODIAZEPINES 07/26/2018 NEGATIVE   NEG   Final    COCAINE 07/26/2018 NEGATIVE   NEG   Final    METHADONE 07/26/2018 NEGATIVE   NEG   Final    OPIATES 07/26/2018 NEGATIVE   NEG   Final    PCP(PHENCYCLIDINE) 07/26/2018 NEGATIVE   NEG   Final    THC (TH-CANNABINOL) 07/26/2018 NEGATIVE   NEG   Final    Drug screen comment 07/26/2018 (NOTE)   Final    Color 07/26/2018 YELLOW    Final    Appearance 07/26/2018 CLEAR  CLEAR   Final    Specific gravity 07/26/2018 1.007  1.003 - 1.030   Final    pH (UA) 07/26/2018 6.5  5.0 - 8.0   Final    Protein 07/26/2018 NEGATIVE   NEG mg/dL Final    Glucose 07/26/2018 NEGATIVE   NEG mg/dL Final    Ketone 07/26/2018 NEGATIVE   NEG mg/dL Final    Bilirubin 07/26/2018 NEGATIVE   NEG   Final    Blood 07/26/2018 MODERATE* NEG   Final    Urobilinogen 07/26/2018 0.2  0.2 - 1.0 EU/dL Final    Nitrites 07/26/2018 NEGATIVE   NEG   Final    Leukocyte Esterase 07/26/2018 NEGATIVE   NEG   Final    WBC 07/26/2018 0-4  0 - 4 /hpf Final    RBC 07/26/2018 0-5  0 - 5 /hpf Final    Epithelial cells 07/26/2018 FEW  FEW /lpf Final    Bacteria 07/26/2018 NEGATIVE   NEG /hpf Final    Urine culture hold 07/26/2018 URINE ON HOLD IN MICROBIOLOGY DEPT FOR 3 DAYS. IF UNPRESERVED URINE IS SUBMITTED, IT CANNOT BE USED FOR ADDITIONAL TESTING AFTER 24 HRS, RECOLLECTION WILL BE REQUIRED. Final    ALCOHOL(ETHYL),SERUM 07/25/2018 <10  <10 MG/DL Final    Ventricular Rate 07/26/2018 84  BPM Final    Atrial Rate 07/26/2018 84  BPM Final    P-R Interval 07/26/2018 128  ms Final    QRS Duration 07/26/2018 82  ms Final    Q-T Interval 07/26/2018 422  ms Final    QTC Calculation (Bezet) 07/26/2018 498  ms Final    Calculated P Axis 07/26/2018 76  degrees Final    Calculated R Axis 07/26/2018 25  degrees Final    Calculated T Axis 07/26/2018 27  degrees Final    Diagnosis 07/26/2018    Final                    Value:Normal sinus rhythm  Nonspecific ST and T wave abnormality  Prolonged QT  No previous ECGs available  Confirmed by Omega Delgado MD, Jessica Jennings ([id number]) on 7/26/2018 8:51:32 AM      TSH 07/25/2018 1.04  0.36 - 3.74 uIU/mL Final        RADIOLOGY REPORTS:  No results found for this or any previous visit. Ct Abd Pelv Wo Cont    Result Date: 7/26/2018  EXAM:  CT ABDOMEN PELVIS WITHOUT CONTRAST INDICATION:  LLQ pain COMPARISON: None. . TECHNIQUE: Unenhanced multislice helical CT was performed from the diaphragm to the symphysis pubis without intravenous contrast administration. Contiguous 5 mm axial images were reconstructed and lung and soft tissue windows were generated. Coronal and sagittal reformations were generated. CT dose reduction was achieved through use of a standardized protocol tailored for this examination and automatic exposure control for dose modulation. Timmeet Mar FINDINGS: INCIDENTALLY IMAGED CHEST: Heart/vessels: Within normal limits. Lungs/Pleura: Calcified nodule in the right lower lobe. . ABDOMEN: Liver: Calculus in the liver suggesting remote granulomatous disease. Gallbladder/Biliary: Status post cholecystectomy. Spleen: Calculus in the spleen suggesting remote granulomatous disease. Pancreas: Pancreatic atrophy. There are a few calculi within lymph nodes at the jacquelyn hepatis Adrenals: Within normal limits. Kidneys: Punctate calculus in the interpolar region of the right kidney. Distended renal pelves, left greater than right Peritoneum/Mesenteries: Minimal perez appearance of the central mesentery. Extraperitoneum: Within normal limits. Gastrointestinal tract: Diverticulosis in the descending and sigmoid colon. Normal-appearing appendix Vascular: Calcifications in the aorta. Right gonadal vein phleboliths. Jearline Erie PELVIS: Extraperitoneum: The floor the pelvis suggesting phleboliths. Ureters: Within normal limits. Bladder: There is a locule of gas in the antidependent portion of the bladder. Reproductive System: Status post hysterectomy. . MSK: Extensive degenerative change throughout the visualized spine, nonfocal. Osteopenia. Tiny, fat-containing umbilical hernia . IMPRESSION: 1. There is a single locule of gas in the antidependent portion of the bladder. Recommend clinical correlation for recent instrumentation versus cystitis 2. There is diverticulosis in the descending and sigmoid colon; however, there is no evidence of diverticulitis. 3. Incidental findings as above.                MEDICATIONS       ALL MEDICATIONS  Current Facility-Administered Medications   Medication Dose Route Frequency    OLANZapine (ZyPREXA) tablet 2.5 mg  2.5 mg Oral Q6H PRN    ziprasidone (GEODON) 10 mg in sterile water (preservative free) 0.5 mL injection  10 mg IntraMUSCular BID PRN    benztropine (COGENTIN) tablet 1 mg  1 mg Oral BID PRN    benztropine (COGENTIN) injection 1 mg  1 mg IntraMUSCular BID PRN    zolpidem (AMBIEN) tablet 5 mg  5 mg Oral QHS PRN    acetaminophen (TYLENOL) tablet 650 mg  650 mg Oral Q4H PRN    ibuprofen (MOTRIN) tablet 400 mg  400 mg Oral Q8H PRN    magnesium hydroxide (MILK OF MAGNESIA) 400 mg/5 mL oral suspension 30 mL  30 mL Oral DAILY PRN    nicotine (NICODERM CQ) 21 mg/24 hr patch 1 Patch  1 Patch TransDERmal DAILY PRN      SCHEDULED MEDICATIONS  Current Facility-Administered Medications   Medication Dose Route Frequency                ASSESSMENT & PLAN        The patient, Roseline Richmond, is a 68 y.o.  female who presents at this time for treatment of the following diagnoses:  Patient Active Hospital Problem List:   Bipolar disorder [facility]) (7/26/2018)    Assessment: kar/ depression     Plan: mood stabilizer and antipsychotic               A coordinated, multidisplinary treatment team (includes the nurse, unit pharmcist,  and writer) round was conducted for this initial evaluation with the patient present. The following regarding medications was addressed during rounds with patient: depakote   the risks and benefits of the proposed medication. The patient was given the opportunity to ask questions. Informed consent given to the use of the above medications. I will continue to adjust psychiatric and non-psychiatric medications (see above \"medication\" section and orders section for details) as deemed appropriate & based upon diagnoses and response to treatment. I have reviewed admission (and previous/old) labs and medical tests in the EHR and or transferring hospital documents. I will continue to order blood tests/labs and diagnostic tests as deemed appropriate and review results as they become available (see orders for details). I have reviewed old psychiatric and medical records available in the EHR. I Will order additional psychiatric records from other institutions to further elucidate the nature of patient's psychopathology and review once available. I will gather additional collateral information from friends, family and o/p treatment team to further elucidate the nature of patient's psychopathology and baselline level of psychiatric functioning.       ESTIMATED LENGTH OF STAY:    4-7       STRENGTHS:  Exercising self-direction/Resourceful, Access to housing/residential stability and Knowledge of medications                                        SIGNED:    Ginny Javier MD  7/26/2018

## 2018-07-26 NOTE — PROGRESS NOTES
Problem: Manic Behavior (Adult/Pediatric)  Goal: *STG: Demonstrates improvement in thought process and speech pattern  Outcome: Not Progressing Towards Goal  Pt extremely hyper verbal, tangential, with pressured speech and flight of ideas. Euphoric mood. Disruptive to peers who are trying to watch news or have conversation. Intrusive with peers.  Requires frequent redirection on appropriate boundaries and interactions

## 2018-07-27 LAB
CHOLEST SERPL-MCNC: 186 MG/DL
GLUCOSE BLD STRIP.AUTO-MCNC: 106 MG/DL (ref 65–100)
GLUCOSE BLD STRIP.AUTO-MCNC: 112 MG/DL (ref 65–100)
GLUCOSE BLD STRIP.AUTO-MCNC: 124 MG/DL (ref 65–100)
GLUCOSE BLD STRIP.AUTO-MCNC: 96 MG/DL (ref 65–100)
GLUCOSE P FAST SERPL-MCNC: 114 MG/DL (ref 65–100)
HDLC SERPL-MCNC: 36 MG/DL
HDLC SERPL: 5.2 {RATIO} (ref 0–5)
LDLC SERPL CALC-MCNC: 119.2 MG/DL (ref 0–100)
LIPID PROFILE,FLP: ABNORMAL
SERVICE CMNT-IMP: ABNORMAL
SERVICE CMNT-IMP: NORMAL
TRIGL SERPL-MCNC: 154 MG/DL (ref ?–150)
VLDLC SERPL CALC-MCNC: 30.8 MG/DL

## 2018-07-27 PROCEDURE — 74011250637 HC RX REV CODE- 250/637: Performed by: PSYCHIATRY & NEUROLOGY

## 2018-07-27 PROCEDURE — 36415 COLL VENOUS BLD VENIPUNCTURE: CPT | Performed by: PSYCHIATRY & NEUROLOGY

## 2018-07-27 PROCEDURE — 82962 GLUCOSE BLOOD TEST: CPT | Performed by: PSYCHIATRY & NEUROLOGY

## 2018-07-27 PROCEDURE — 82947 ASSAY GLUCOSE BLOOD QUANT: CPT | Performed by: PSYCHIATRY & NEUROLOGY

## 2018-07-27 PROCEDURE — 82962 GLUCOSE BLOOD TEST: CPT

## 2018-07-27 PROCEDURE — 65220000003 HC RM SEMIPRIVATE PSYCH

## 2018-07-27 PROCEDURE — 80061 LIPID PANEL: CPT | Performed by: PSYCHIATRY & NEUROLOGY

## 2018-07-27 RX ORDER — LIDOCAINE 50 MG/G
3 PATCH TOPICAL EVERY 24 HOURS
Status: DISCONTINUED | OUTPATIENT
Start: 2018-07-27 | End: 2018-08-01 | Stop reason: HOSPADM

## 2018-07-27 RX ORDER — LITHIUM CARBONATE 450 MG/1
450 TABLET ORAL 2 TIMES DAILY
Status: DISCONTINUED | OUTPATIENT
Start: 2018-07-27 | End: 2018-07-30

## 2018-07-27 RX ADMIN — LITHIUM CARBONATE 450 MG: 450 TABLET, EXTENDED RELEASE ORAL at 20:00

## 2018-07-27 RX ADMIN — IBUPROFEN 400 MG: 400 TABLET ORAL at 19:52

## 2018-07-27 RX ADMIN — LORAZEPAM 2 MG: 2 TABLET ORAL at 16:32

## 2018-07-27 RX ADMIN — ZOLPIDEM TARTRATE 5 MG: 5 TABLET ORAL at 20:45

## 2018-07-27 RX ADMIN — MICONAZOLE NITRATE: 20 CREAM TOPICAL at 09:26

## 2018-07-27 RX ADMIN — METFORMIN HYDROCHLORIDE 500 MG: 500 TABLET, FILM COATED ORAL at 09:25

## 2018-07-27 RX ADMIN — IBUPROFEN 400 MG: 400 TABLET ORAL at 05:47

## 2018-07-27 RX ADMIN — LITHIUM CARBONATE 450 MG: 450 TABLET, EXTENDED RELEASE ORAL at 09:24

## 2018-07-27 NOTE — PROGRESS NOTES
Problem: Falls - Risk of  Goal: *Absence of Falls  Document Yobany Fall Risk and appropriate interventions in the flowsheet. Fall Risk Interventions:  Mobility Interventions: Patient to call before getting OOB    Mentation Interventions: Adequate sleep, hydration, pain control, Bed/chair exit alarm, Door open when patient unattended, Reorient patient, More frequent rounding, Toileting rounds    Medication Interventions: Teach patient to arise slowly    Elimination Interventions: Bed/chair exit alarm       Received pt in bed asleep, not appearing to be in distress. Can at bedside, bed in lowest position, call bell in reach, bed alarm on. Pt remains on q15min checks. Will continue to monitor.

## 2018-07-27 NOTE — PROGRESS NOTES
Problem: Altered Thought Process (Adult/Pediatric) Goal: *STG: Participates in treatment plan Outcome: Progressing Towards Goal 
Patient participates in treatment plan. Patient is labile, affect congruent, intrusive, hyperverbal, restless, inappropriate with peers and staff. Patient is unsteady, uses cane for ambulation, out on the unit, med and meal compliant.

## 2018-07-27 NOTE — BH NOTES
GROUP THERAPY PROGRESS NOTE Alejandra Ricci is participating in music. Group time: 20 minutes Personal goal for participation: quiet time for music and personal reflection Goal orientation: relaxation Group therapy participation: disruptive Therapeutic interventions reviewed and discussed: pt angry at staff and slammed doors Impression of participation: present

## 2018-07-27 NOTE — INTERDISCIPLINARY ROUNDS
Behavioral Health Interdisciplinary Rounds Patient Name: Theresa Levy  Age: 68 y.o. Room/Bed:  742/ Primary Diagnosis: <principal problem not specified> Admission Status: Voluntary Readmission within 30 days: yes Power of  in place: no 
Patient requires a blocked bed: no          Reason for blocked bed: VTE Prophylaxis:  
 
Mobility needs/Fall risk: yes Nutritional Plan: no 
Consults:         
Labs/Testing due today?:  
 
Sleep hours:       
Participation in Care/Groups:   
Medication Compliant?:  
PRNS (last 24 hours):    
Restraints (last 24 hours): CIWA (range last 24 hours): COWS (range last 24 hours): Alcohol screening (AUDIT) completed -   AUDIT Score: 0 If applicable, date SBIRT discussed in treatment team AND documented:  
 
Tobacco - patient is a smoker: Have You Used Tobacco in the Past 30 Days: No 
Illegal Drugs use: Have You Used Any Illegal Substances Over the Past 12 Months: No 
 
24 hour chart check complete: yes Patient goal(s) for today: attend unit activities; take medications as prescribed Treatment team focus/goals: Plan to titrate her medication Progress note: She remains hyperverbal, manic, and tangential; believes she is discharging tomorrow LOS:  1  Expected LOS: TBD Financial concerns/prescription coverage:  Medicare Date of last family contact:  None Family requesting physician contact today:  No 
Discharge plan: TBD Guns in the home:  No      
Outpatient provider(s):  TBD Participating treatment team members: Beboeric MildredAlissa SW; Dr. Jeff Mack MD; Wendy Meyers RN; Jose Elias Wells PharmD

## 2018-07-27 NOTE — PROGRESS NOTES
Problem: Altered Thought Process (Adult/Pediatric) Goal: *STG: Seeks staff when feelings of anxiety and fear arise Outcome: Progressing Towards Goal 
Received pt up in chair looking out the window of her room. She is seated near room-mate and pt talks non-stop to this other patient. The room-mate leaves the room and comes out to day room where it is more quiet. Patient ambulated out steadily using her cane and came out to day room where her room-mate had come out to. Patient states she wants news on the tv but due to another pt's anxiety and request for quiet time, staff asked that we have quiet music for a short period and place tv on in about 30mins. Pt quickly returned to her room and slammed the door. She used the BR and had a bm x1. Staff will continue q15 checks, encourage pt to share feelings, decrease stimuli, give emotional support and encouragement.

## 2018-07-27 NOTE — BH NOTES
GROUP THERAPY PROGRESS NOTE Tessa Peterson participated in a Morning Process Group on the Geriatric Unit, with a focus identifying feelings, planning for the day, and singing. Group time: 45 minutes. Personal goal for participation: To increase the capacity to shift ones mood, prepare for the day, and share in group singing. Goal orientation: The patient will be able to prepare for the day through group singing. Group therapy participation: When prompted, this patient actively participated in the group. Therapeutic interventions reviewed and discussed: The group members were introduce themselves by first names and participate in group singing as a way to increase their oxygen and blood flow and begin their day on a positive note. They were also asked to join in singing several songs. Impression of participation: The patient said she was feeling \"okay\" and added that she planned to be leaving the hospital tomorrow. She shared about being in [employer], meeting her  in the service, and their separation when her daughter was [age] years old. She was alert, generally oriented, and pleasant. He/She expressed no current SI/HI or overt psychosis. She was rather talkative but was able to acknowledge the songs that she recognized, but said she could not sing because of tightness in her throat. This was the patient's first process group with the undersigned.

## 2018-07-27 NOTE — BH NOTES
Pt more calm, she was ordered another tray with chicken noodle soup, crackers and a salad. She is eating and told staff she loved them.

## 2018-07-27 NOTE — PROGRESS NOTES
PRN Medication Documentation    Specific patient behavior that led to need for PRN medication: pt c/o pain on right side of body. Acute on chronic pain  Staff interventions attempted prior to PRN being given: therapeutic listening  PRN medication given: 400mg motrin  Patient response/effectiveness of PRN medication: pt laying in bed.  Will follow up with pain reassessment

## 2018-07-28 LAB
GLUCOSE BLD STRIP.AUTO-MCNC: 111 MG/DL (ref 65–100)
GLUCOSE BLD STRIP.AUTO-MCNC: 121 MG/DL (ref 65–100)
GLUCOSE BLD STRIP.AUTO-MCNC: 125 MG/DL (ref 65–100)
GLUCOSE BLD STRIP.AUTO-MCNC: 139 MG/DL (ref 65–100)
SERVICE CMNT-IMP: ABNORMAL

## 2018-07-28 PROCEDURE — 65220000003 HC RM SEMIPRIVATE PSYCH

## 2018-07-28 PROCEDURE — 82962 GLUCOSE BLOOD TEST: CPT

## 2018-07-28 PROCEDURE — 74011250637 HC RX REV CODE- 250/637: Performed by: PSYCHIATRY & NEUROLOGY

## 2018-07-28 RX ORDER — OLANZAPINE 2.5 MG/1
2.5 TABLET ORAL
Status: DISCONTINUED | OUTPATIENT
Start: 2018-07-28 | End: 2018-07-29

## 2018-07-28 RX ADMIN — LORAZEPAM 2 MG: 2 TABLET ORAL at 02:12

## 2018-07-28 RX ADMIN — MICONAZOLE NITRATE: 20 CREAM TOPICAL at 19:08

## 2018-07-28 RX ADMIN — MICONAZOLE NITRATE: 20 CREAM TOPICAL at 09:43

## 2018-07-28 RX ADMIN — LITHIUM CARBONATE 450 MG: 450 TABLET, EXTENDED RELEASE ORAL at 09:35

## 2018-07-28 RX ADMIN — LORAZEPAM 2 MG: 2 TABLET ORAL at 16:55

## 2018-07-28 RX ADMIN — HALOPERIDOL 5 MG: 5 TABLET ORAL at 16:55

## 2018-07-28 RX ADMIN — ACETAMINOPHEN 650 MG: 325 TABLET, FILM COATED ORAL at 16:55

## 2018-07-28 RX ADMIN — METFORMIN HYDROCHLORIDE 500 MG: 500 TABLET, FILM COATED ORAL at 09:35

## 2018-07-28 NOTE — BH NOTES
PRN Medication Documentation Specific patient behavior that led to need for PRN medication: pt extremely  Irritable demanding a wheelchair, complaining of extreme anxiety \"chills all over body\", hyperverbal, rapid pressured speech Staff interventions attempted prior to PRN being given: listening presence, reassurance, redirection, decreased stimuli PRN medication given: ativan po prn and haldol po prn at 1655 Patient response/effectiveness of PRN medication: 30 minutes post administration pt is laying quietly in bed, more redirectable, less hyper verbal and less intrusive

## 2018-07-28 NOTE — PROGRESS NOTES
Problem: Falls - Risk of 
Goal: *Absence of Falls Document Patric Ramirez Fall Risk and appropriate interventions in the flowsheet. Outcome: Progressing Towards Goal 
Fall Risk Interventions: 
Mobility Interventions: Assess mobility with egress test 
 
Mentation Interventions: Adequate sleep, hydration, pain control Medication Interventions: Bed/chair exit alarm Elimination Interventions: Bed/chair exit alarm Resting in bed with eyes closed, no complaints, no distress noted. Safety measures in place, will continue to monitor. PRN Medication Documentation Specific patient behavior that led to need for PRN medication: agitation Staff interventions attempted prior to PRN being given: education PRN medication given: ativan Patient response/effectiveness of PRN medication: will continue to monitor

## 2018-07-28 NOTE — BH NOTES
PSYCHIATRIC PROGRESS NOTE Patient Name  Theresa Levy Date of Birth 1945 [facility] [id number] Medical Record Number  [id number] Age  68 y.o. PCP None Admit date:  7/25/2018 Room Number  518/60  @ [facility]  
Date of Service  7/28/2018 PSYCHOTHERAPY SESSION NOTE: 
Length of psychotherapy session: 20 minutes Main condition/diagnosis/issues treated during session today, 7/28/2018 : kar, medications, coping skills I employed Cognitive Behavioral therapy techniques, Reality-Oriented psychotherapy, as well as supportive psychotherapy in regards to various ongoing psychosocial stressors, including the following: pre-admission and current problems; medical issues; and stress of hospitalization. Interpersonal relationship issues and psychodynamic conflicts explored. Attempts made to alleviate maladaptive patterns. We, also, worked on issues of denial & effects of substance dependency/use Overall, patient is not progressing Treatment Plan Update (reviewed an updated 7/28/2018) : I will modify psychotherapy tx plan by implementing more stress management strategies, building upon cognitive behavioral techniques, increasing coping skills, as well as shoring up psychological defenses). An extended energy and skill set was needed to engage pt in psychotherapy due to some of the following: resistiveness, complexity, negativity, confrontational nature, hostile behaviors, and/or severe abnormalities in thought processes/psychosis resulting in the loss of expressive/receptive language communication skills. E & M PROGRESS NOTE: 
  
 
 
HISTORY  
   
CC:  \"kar\" HISTORY OF PRESENT ILLNESS/INTERVAL HISTORY:  (reviewed/updated 7/28/2018). per initial evaluation: The patient, Theresa Levy, is a 68 y.o.   WHITE [place]  female with a past psychiatric history significant for bipolar disorder , who presents at this time with complaints of (and/or evidence of) the following emotional symptoms: cinthya. Additional symptomatology include agitation. The above symptoms have been present for years. These symptoms are of severe severity. These symptoms are constant  in nature. The patient's condition has been precipitated by medication non complianceand psychosocial stressors (arguing with selene  ). Patient's condition made worse by  treatment noncompliance. UDS: +negative; BAL=0 Brittni Pa presents/reports/evidences the following emotional symptoms today, 7/28/2018:cinthya. The above symptoms have been present for years. These symptoms are of severe severity. The symptoms are constant  in nature. Additional symptomatology and features include pressured speech, looseness of assoc. , psychosis, mood lability. SIDE EFFECTS: (reviewed/updated 7/28/2018) None reported or admitted to. No noted toxicity with use of Depakote/Tegretol/lithium/Clozaril/TCAs ALLERGIES:(reviewed/updated 7/28/2018) No Known Allergies MEDICATIONS PRIOR TO ADMISSION:(reviewed/updated 7/28/2018) Prescriptions Prior to Admission Medication Sig  cholecalciferol (VITAMIN D3) 1,000 unit tablet Take 1,000 Units by mouth daily.  QUEtiapine (SEROQUEL) 100 mg tablet Take 100 mg by mouth nightly. Indications: Cinthya associated with Bipolar Disorder  metFORMIN (GLUCOPHAGE) 500 mg tablet Take  by mouth daily (with breakfast). Indications: type 2 diabetes mellitus  Omega-3 Fatty Acids (FISH OIL) 500 mg cap Take 1,000 mg by mouth. Indications: hypertriglyceridemia  lisinopril (PRINIVIL, ZESTRIL) 2.5 mg tablet Take 2.5 mg by mouth daily. Indications: hypertension PAST MEDICAL HISTORY: Past medical history from the initial psychiatric evaluation has been reviewed (reviewed/updated 7/28/2018) with no additional updates (I asked patient and no additional past medical history provided). Past Medical History:  
Diagnosis Date  Anxiety  Bipolar 1 disorder ([facility] son not sure where patient was diagnosed  Diabetes ([facility]  Hypertension History reviewed. No pertinent surgical history. SOCIAL HISTORY: Social history from the initial psychiatric evaluation has been reviewed (reviewed/updated 7/28/2018) with no additional updates (I asked patient and no additional social history provided). Social History Social History  Marital status:  Spouse name: N/A  
 Number of children: N/A  
 Years of education: N/A Occupational History  Not on file. Social History Main Topics  Smoking status: Never Smoker  Smokeless tobacco: Never Used  Alcohol use No  
 Drug use: Not on file  Sexual activity: Not on file Other Topics Concern  Not on file Social History Narrative 68year old  female admitted voluntarily for psyychotic behavior. Pt has paranoid delusions. She is widodef and lives alone. She was [facility] [place] 3 days ago. FAMILY HISTORY: Family history from the initial psychiatric evaluation has been reviewed (reviewed/updated 7/28/2018) with no additional updates (I asked patient and no additional family history provided). History reviewed. No pertinent family history. REVIEW OF SYSTEMS: (reviewed/updated 7/28/2018) Appetite:no change from normal  
Sleep: no change All other Review of Systems: Psychological ROS: positive for - concentration difficulties Cardiovascular ROS: no chest pain or dyspnea on exertion Gastrointestinal ROS: no abdominal pain, change in bowel habits, or black or bloody stools Elrosast MENTAL STATUS EXAM (MSE): MSE FINDINGS ARE WITHIN NORMAL LIMITS (WNL) UNLESS OTHERWISE STATED BELOW. ( ALL OF THE BELOW CATEGORIES OF THE MSE HAVE BEEN REVIEWED (reviewed 7/28/2018) AND UPDATED AS DEEMED APPROPRIATE ) General Presentation age appropriate, cooperative Orientation oriented to time, place and person Vital Signs  See below (reviewed 7/28/2018); Vital Signs (BP, Pulse, & Temp) are within normal limits if not listed below. Gait and Station Stable/steady, no ataxia Musculoskeletal System No extrapyramidal symptoms (EPS); no abnormal muscular movements or Tardive Dyskinesia (TD); muscle strength and tone are within normal limits Language No aphasia or dysarthria Speech:  pressured Thought Processes disorganized; fast rate of thoughts; poor abstract reasoning/computation Thought Associations loose associations and tangential  
Thought Content bizarre delusions Suicidal Ideations none Homicidal Ideations none Mood:  irritable Affect:  mood-congruent Memory recent  fair Memory remote:  fair Concentration/Attention:  distractable Fund of Knowledge average Insight:  limited Reliability poor Judgment:  limited VITALS:    
Patient Vitals for the past 24 hrs: 
 Temp Pulse Resp BP SpO2  
07/28/18 0753 98.2 °F (36.8 °C) 65 16 120/56 100 % 07/27/18 1956 98.1 °F (36.7 °C) 88 18 141/59 95 %  
07/27/18 1621 98.3 °F (36.8 °C) 92 20 124/70 96 % Wt Readings from Last 3 Encounters:  
07/25/18 69.9 kg (154 lb) 07/09/17 69.9 kg (154 lb 3.2 oz) Temp Readings from Last 3 Encounters:  
07/28/18 98.2 °F (36.8 °C)  
07/09/17 98.6 °F (37 °C) BP Readings from Last 3 Encounters:  
07/28/18 120/56  
07/09/17 190/90 Pulse Readings from Last 3 Encounters:  
07/28/18 65  
07/09/17 99 DATA LABORATORY DATA:(reviewed/updated 7/28/2018) Recent Results (from the past 24 hour(s)) GLUCOSE, POC Collection Time: 07/27/18  4:19 PM  
Result Value Ref Range Glucose (POC) 124 (H) 65 - 100 mg/dL Performed by Perfect [employer] Keyla) WONG   
GLUCOSE, POC Collection Time: 07/27/18  7:43 PM  
Result Value Ref Range Glucose (POC) 112 (H) 65 - 100 mg/dL Performed by Perfect Audience (Babin) WONG   
GLUCOSE, POC Collection Time: 07/28/18  7:24 AM  
Result Value Ref Range  Glucose (POC) 111 (H) 65 - 100 mg/dL Performed by Tang Bhakta GLUCOSE, POC Collection Time: 07/28/18 11:27 AM  
Result Value Ref Range Glucose (POC) 139 (H) 65 - 100 mg/dL Performed by Kayla Hannon No results found for: VALF2, VALAC, VALP, VALPR, DS6, CRBAM, CRBAMP, CARB2, XCRBAM 
No results found for: LITHM  
RADIOLOGY REPORTS:(reviewed/updated 7/28/2018) Ct Abd Pelv Wo Cont Result Date: 7/26/2018 EXAM:  CT ABDOMEN PELVIS WITHOUT CONTRAST INDICATION:  LLQ pain COMPARISON: None. . TECHNIQUE: Unenhanced multislice helical CT was performed from the diaphragm to the symphysis pubis without intravenous contrast administration. Contiguous 5 mm axial images were reconstructed and lung and soft tissue windows were generated. Coronal and sagittal reformations were generated. CT dose reduction was achieved through use of a standardized protocol tailored for this examination and automatic exposure control for dose modulation. Marva Gearing FINDINGS: INCIDENTALLY IMAGED CHEST: Heart/vessels: Within normal limits. Lungs/Pleura: Calcified nodule in the right lower lobe. . ABDOMEN: Liver: Calculus in the liver suggesting remote granulomatous disease. Gallbladder/Biliary: Status post cholecystectomy. Spleen: Calculus in the spleen suggesting remote granulomatous disease. Pancreas: Pancreatic atrophy. There are a few calculi within lymph nodes at the jacquelyn hepatis Adrenals: Within normal limits. Kidneys: Punctate calculus in the interpolar region of the right kidney. Distended renal pelves, left greater than right Peritoneum/Mesenteries: Minimal perez appearance of the central mesentery. Extraperitoneum: Within normal limits. Gastrointestinal tract: Diverticulosis in the descending and sigmoid colon. Normal-appearing appendix Vascular: Calcifications in the aorta. Right gonadal vein phleboliths. Marva Gearing PELVIS: Extraperitoneum: The floor the pelvis suggesting phleboliths. Ureters: Within normal limits. Bladder:  There is a locule of gas in the antidependent portion of the bladder. Reproductive System: Status post hysterectomy. . MSK: Extensive degenerative change throughout the visualized spine, nonfocal. Osteopenia. Tiny, fat-containing umbilical hernia . IMPRESSION: 1. There is a single locule of gas in the antidependent portion of the bladder. Recommend clinical correlation for recent instrumentation versus cystitis 2. There is diverticulosis in the descending and sigmoid colon; however, there is no evidence of diverticulitis. 3. Incidental findings as above. MEDICATIONS ALL MEDICATIONS:  
Current Facility-Administered Medications Medication Dose Route Frequency  lithium carbonate CR (ESKALITH CR) tablet 450 mg  450 mg Oral BID  lidocaine (LIDODERM) 5 % patch 3 Patch  3 Patch TransDERmal Q24H  
 OLANZapine (ZyPREXA) tablet 2.5 mg  2.5 mg Oral Q6H PRN  
 ziprasidone (GEODON) 10 mg in sterile water (preservative free) 0.5 mL injection  10 mg IntraMUSCular BID PRN  
 benztropine (COGENTIN) tablet 1 mg  1 mg Oral BID PRN  
 benztropine (COGENTIN) injection 1 mg  1 mg IntraMUSCular BID PRN  
 zolpidem (AMBIEN) tablet 5 mg  5 mg Oral QHS PRN  
 acetaminophen (TYLENOL) tablet 650 mg  650 mg Oral Q4H PRN  
 ibuprofen (MOTRIN) tablet 400 mg  400 mg Oral Q8H PRN  
 magnesium hydroxide (MILK OF MAGNESIA) 400 mg/5 mL oral suspension 30 mL  30 mL Oral DAILY PRN  
 nicotine (NICODERM CQ) 21 mg/24 hr patch 1 Patch  1 Patch TransDERmal DAILY PRN  
 miconazole (SECURA) 2 % extra thick cream   Topical BID  metFORMIN (GLUCOPHAGE) tablet 500 mg  500 mg Oral DAILY  LORazepam (ATIVAN) tablet 2 mg  2 mg Oral Q6H PRN Or  
 LORazepam (ATIVAN) injection 2 mg  2 mg IntraMUSCular Q6H PRN  
 diphenhydrAMINE (BENADRYL) capsule 50 mg  50 mg Oral Q6H PRN Or  
 diphenhydrAMINE (BENADRYL) injection 50 mg  50 mg IntraMUSCular Q6H PRN  
 haloperidol (HALDOL) tablet 5 mg  5 mg Oral Q6H PRN  Or  
 haloperidol lactate (HALDOL) injection 5 mg  5 mg IntraMUSCular Q6H PRN  
 insulin lispro (HUMALOG) injection   SubCUTAneous AC&HS  
 glucose chewable tablet 16 g  4 Tab Oral PRN  
 dextrose (D50W) injection syrg 12.5-25 g  12.5-25 g IntraVENous PRN  
 glucagon (GLUCAGEN) injection 1 mg  1 mg IntraMUSCular PRN  
  
SCHEDULED MEDICATIONS:  
Current Facility-Administered Medications Medication Dose Route Frequency  lithium carbonate CR (ESKALITH CR) tablet 450 mg  450 mg Oral BID  lidocaine (LIDODERM) 5 % patch 3 Patch  3 Patch TransDERmal Q24H  
 miconazole (SECURA) 2 % extra thick cream   Topical BID  metFORMIN (GLUCOPHAGE) tablet 500 mg  500 mg Oral DAILY  insulin lispro (HUMALOG) injection   SubCUTAneous AC&HS  
  
 
 
ASSESSMENT & PLAN  
 
DIAGNOSES REQUIRING ACTIVE TREATMENT AND MONITORING: (reviewed/updated 7/28/2018) Patient Active Hospital Problem List: 
 [facility] (7/26/2018) Assessment: kar alternating with depression Plan: mood stabilizer and antispychotic Continue Eskalith Start Olanzapine 2.5mg at night I will continue to monitor blood levels (Depakote, Tegretol, lithium, clozapine---a drug with a narrow therapeutic index= NTI) and associated labs for drug therapy implemented that require intense monitoring for toxicity as deemed appropriate based on current medication side effects and pharmacodynamically determined drug 1/2 lives. In summary, Chapis Figueroa, is a 68 y.o.  female who presents with a severe exacerbation of the principal diagnosis of <principal problem not specified> Patient's condition is worsening/not improving/not stable . Patient requires continued inpatient hospitalization for further stabilization, safety monitoring and medication management.   I will continue to coordinate the provision of individual, milieu, occupational, group, and substance abuse therapies to address target symptoms/diagnoses as deemed appropriate for the individual patient. A coordinated, multidisplinary treatment team round was conducted with the patient (this team consists of the nurse, psychiatric unit pharmcist,  and writer). Complete current electronic health record for patient has been reviewed today including consultant notes, ancillary staff notes, nurses and psychiatric tech notes. Suicide risk assessment completed and patient deemed to be of low risk for suicide at this time. The following regarding medications was addressed during rounds with patient:  
the risks and benefits of the proposed medication. The patient was given the opportunity to ask questions. Informed consent given to the use of the above medications. Will continue to adjust psychiatric and non-psychiatric medications (see above \"medication\" section and orders section for details) as deemed appropriate & based upon diagnoses and response to treatment. I will continue to order blood tests/labs and diagnostic tests as deemed appropriate and review results as they become available (see orders for details and above listed lab/test results). I will order psychiatric records from previous [facility] hospitals to further elucidate the nature of patient's psychopathology and review once available. I will gather additional collateral information from friends, family and o/p treatment team to further elucidate the nature of patient's psychopathology and baselline level of psychiatric functioning. I certify that this patient's inpatient psychiatric hospital services furnished since the previous certification were, and continue to be, required for treatment that could reasonably be expected to improve the patient's condition, or for diagnostic study, and that the patient continues to need, on a daily basis, active treatment furnished directly by or requiring the supervision of inpatient psychiatric facility personnel.  In addition, the hospital records show that services furnished were intensive treatment services, admission or related services, or equivalent services. EXPECTED DISCHARGE DATE/DAY: TBD  
 
DISPOSITION: Home Signed By:  
Anju Silva MD 
7/28/2018

## 2018-07-28 NOTE — INTERDISCIPLINARY ROUNDS
Behavioral Health Interdisciplinary Rounds Patient Name: Óscar Morgan  Age: 68 y.o. Room/Bed:  2/ Primary Diagnosis: <principal problem not specified> Admission Status: Voluntary Readmission within 30 days: yes Power of  in place: no 
Patient requires a blocked bed: no          Reason for blocked bed: VTE Prophylaxis: No 
 
Mobility needs/Fall risk: yes Nutritional Plan: no 
Consults:         
Labs/Testing due today?: no 
 
Sleep hours:  5 broken Participation in Care/Groups:  yes Medication Compliant?: Yes PRNS (last 24 hours): Antianxiety and Sleep Aid Restraints (last 24 hours):  no 
  
CIWA (range last 24 hours): COWS (range last 24 hours): Alcohol screening (AUDIT) completed -   AUDIT Score: 0 If applicable, date SBIRT discussed in treatment team AND documented:  
 
Tobacco - patient is a smoker: Have You Used Tobacco in the Past 30 Days: No 
Illegal Drugs use: Have You Used Any Illegal Substances Over the Past 12 Months: No 
 
24 hour chart check complete: yes Patient goal(s) for today:  
Treatment team focus/goals:  
Progress note LOS:  2  Expected LOS:  
 
Financial concerns/prescription coverage:   
Date of last family contact:      
Family requesting physician contact today:   
Discharge plan:  
Guns in the home: Outpatient provider(s):  
 
Participating treatment team members:  Óscar Morgan, * (assigned SW),

## 2018-07-28 NOTE — PROGRESS NOTES
Problem: Altered Thought Process (Adult/Pediatric) Goal: *STG: Participates in treatment plan Outcome: Progressing Towards Goal 
Med and meal compliant. Cooperative. Labile. Talkative. Demanding. Disorganized. Patient goal: \"participate in groups\" Staff goal: to assist with/encourage ADLS. Denies SI. Goal: Interventions Outcome: Progressing Towards Goal 
Medication management. Q 15 min safety rounds. Group therapy.

## 2018-07-28 NOTE — PROGRESS NOTES
Problem: Altered Thought Process (Adult/Pediatric) Goal: *STG: Seeks staff when feelings of anxiety and fear arise Outcome: Progressing Towards Goal 
Pt reporting extreme anxiety citing \"its so bad\" I have chills all over my body. \"I feel miserable\". Pt requested and received prn medication.

## 2018-07-29 LAB
GLUCOSE BLD STRIP.AUTO-MCNC: 103 MG/DL (ref 65–100)
GLUCOSE BLD STRIP.AUTO-MCNC: 104 MG/DL (ref 65–100)
GLUCOSE BLD STRIP.AUTO-MCNC: 128 MG/DL (ref 65–100)
GLUCOSE BLD STRIP.AUTO-MCNC: 148 MG/DL (ref 65–100)
SERVICE CMNT-IMP: ABNORMAL

## 2018-07-29 PROCEDURE — 74011636637 HC RX REV CODE- 636/637: Performed by: NURSE PRACTITIONER

## 2018-07-29 PROCEDURE — 74011250637 HC RX REV CODE- 250/637: Performed by: PSYCHIATRY & NEUROLOGY

## 2018-07-29 PROCEDURE — 65220000003 HC RM SEMIPRIVATE PSYCH

## 2018-07-29 PROCEDURE — 82962 GLUCOSE BLOOD TEST: CPT

## 2018-07-29 RX ORDER — OLANZAPINE 5 MG/1
5 TABLET ORAL
Status: DISCONTINUED | OUTPATIENT
Start: 2018-07-29 | End: 2018-07-30

## 2018-07-29 RX ADMIN — INSULIN LISPRO 2 UNITS: 100 INJECTION, SOLUTION INTRAVENOUS; SUBCUTANEOUS at 13:00

## 2018-07-29 RX ADMIN — OLANZAPINE 5 MG: 5 TABLET, FILM COATED ORAL at 20:25

## 2018-07-29 RX ADMIN — ACETAMINOPHEN 650 MG: 325 TABLET, FILM COATED ORAL at 04:33

## 2018-07-29 RX ADMIN — IBUPROFEN 400 MG: 400 TABLET ORAL at 21:33

## 2018-07-29 RX ADMIN — METFORMIN HYDROCHLORIDE 500 MG: 500 TABLET, FILM COATED ORAL at 08:30

## 2018-07-29 RX ADMIN — LITHIUM CARBONATE 450 MG: 450 TABLET, EXTENDED RELEASE ORAL at 20:24

## 2018-07-29 RX ADMIN — MICONAZOLE NITRATE: 20 CREAM TOPICAL at 17:20

## 2018-07-29 RX ADMIN — MICONAZOLE NITRATE: 20 CREAM TOPICAL at 08:41

## 2018-07-29 RX ADMIN — LITHIUM CARBONATE 450 MG: 450 TABLET, EXTENDED RELEASE ORAL at 08:30

## 2018-07-29 NOTE — PROGRESS NOTES
Problem: Falls - Risk of 
Goal: *Absence of Falls Document Amando Beatflako Fall Risk and appropriate interventions in the flowsheet. Outcome: Progressing Towards Goal 
Fall Risk Interventions: 
Mobility Interventions: Bed/chair exit alarm, Patient to call before getting OOB, Utilize walker, cane, or other assistive device, Communicate number of staff needed for ambulation/transfer In bed asleep with respirations noted as even and unlabored as chest was rising and falling. Will continue to monitor for safety and 15 minute checks throughout shift Mentation Interventions: Adequate sleep, hydration, pain control, Door open when patient unattended, More frequent rounding, Toileting rounds, Increase mobility Medication Interventions: Teach patient to arise slowly, Patient to call before getting OOB Elimination Interventions: Bed/chair exit alarm

## 2018-07-29 NOTE — INTERDISCIPLINARY ROUNDS
Behavioral Health Interdisciplinary Rounds Patient Name: Óscar Morgan  Age: 68 y.o. Room/Bed:  742/ Primary Diagnosis: Bipolar disorder ([email or web address] Admission Status: Voluntary Readmission within 30 days: yes Power of  in place: no 
Patient requires a blocked bed: no          Reason for blocked bed: VTE Prophylaxis: No 
 
Mobility needs/Fall risk: yes Nutritional Plan: no 
Consults:         
Labs/Testing due today?: no 
 
Sleep hours: 6.50 Participation in Care/Groups:  yes Medication Compliant?: Yes PRNS (last 24 hours): Antianxiety and Sleep Aid Restraints (last 24 hours):  no 
  
CIWA (range last 24 hours): COWS (range last 24 hours): Alcohol screening (AUDIT) completed -   AUDIT Score: 0 If applicable, date SBIRT discussed in treatment team AND documented:  
 
Tobacco - patient is a smoker: Have You Used Tobacco in the Past 30 Days: No 
Illegal Drugs use: Have You Used Any Illegal Substances Over the Past 12 Months: No 
 
24 hour chart check complete: yes Patient goal(s) for today:  
Treatment team focus/goals:  
Progress note LOS:  3  Expected LOS:  
 
Financial concerns/prescription coverage:   
 
Date of last family contact:     
Family requesting physician contact today:   
Discharge plan:  
Guns in the home: Outpatient provider(s):  
 
Participating treatment team members:  Óscar Morgan, * (assigned SW),

## 2018-07-29 NOTE — PROGRESS NOTES
Problem: Altered Thought Process (Adult/Pediatric) Goal: *STG: Participates in treatment plan Outcome: Progressing Towards Goal 
Patient denies SI. Med and meal compliant. Cooperative. Patient talkative but has not been excessively talking or inappropriate. Mood is calm, euthymic Goal: Interventions Outcome: Progressing Towards Goal 
Medication management. Q 15 min safety rounds. Group therapy.

## 2018-07-29 NOTE — PROGRESS NOTES
Problem: Altered Thought Process (Adult/Pediatric) Goal: *STG: Participates in treatment plan Outcome: Not Progressing Towards Goal 
Pt is talkative, labile disorganized and demanding Cooperative and redirectable. Q 15 min checks 1 person assist needed to help patient Med/meal compliant

## 2018-07-29 NOTE — BH NOTES
PSYCHIATRIC PROGRESS NOTE Patient Name  Óscar Morgan Date of Birth 1945 [facility] [id number] Medical Record Number  [id number] Age  68 y.o. PCP None Admit date:  7/25/2018 Room Number  152/32  @ [address]  
Date of Service  7/29/2018 PSYCHOTHERAPY SESSION NOTE: 
Length of psychotherapy session: 20 minutes Main condition/diagnosis/issues treated during session today, 7/29/2018 : kar, medications, coping skills I employed Cognitive Behavioral therapy techniques, Reality-Oriented psychotherapy, as well as supportive psychotherapy in regards to various ongoing psychosocial stressors, including the following: pre-admission and current problems; medical issues; and stress of hospitalization. Interpersonal relationship issues and psychodynamic conflicts explored. Attempts made to alleviate maladaptive patterns. We, also, worked on issues of denial & effects of substance dependency/use Overall, patient is not progressing Treatment Plan Update (reviewed an updated 7/29/2018) : I will modify psychotherapy tx plan by implementing more stress management strategies, building upon cognitive behavioral techniques, increasing coping skills, as well as shoring up psychological defenses). An extended energy and skill set was needed to engage pt in psychotherapy due to some of the following: resistiveness, complexity, negativity, confrontational nature, hostile behaviors, and/or severe abnormalities in thought processes/psychosis resulting in the loss of expressive/receptive language communication skills. E & M PROGRESS NOTE: 
  
 
 
HISTORY  
   
CC:  \"kar\" HISTORY OF PRESENT ILLNESS/INTERVAL HISTORY:  (reviewed/updated 7/29/2018). per initial evaluation: The patient, Óscar Morgan, is a 68 y.o.   WHITE [place]  female with a past psychiatric history significant for bipolar disorder , who presents at this time with complaints of (and/or evidence of) the following emotional symptoms: cinthya. Additional symptomatology include agitation. The above symptoms have been present for years. These symptoms are of severe severity. These symptoms are constant  in nature. The patient's condition has been precipitated by medication non complianceand psychosocial stressors (arguing with selene  ). Patient's condition made worse by  treatment noncompliance. UDS: +negative; BAL=0 Quintin Griffith presents/reports/evidences the following emotional symptoms today, 7/29/2018:cinthya. The above symptoms have been present for years. These symptoms are of severe severity. The symptoms are constant  in nature. Additional symptomatology and features include pressured speech, looseness of assoc. , psychosis, mood lability. (+)prns. Poor insight SIDE EFFECTS: (reviewed/updated 7/29/2018) None reported or admitted to. No noted toxicity with use of Depakote/Tegretol/lithium/Clozaril/TCAs ALLERGIES:(reviewed/updated 7/29/2018) No Known Allergies MEDICATIONS PRIOR TO ADMISSION:(reviewed/updated 7/29/2018) Prescriptions Prior to Admission Medication Sig  cholecalciferol (VITAMIN D3) 1,000 unit tablet Take 1,000 Units by mouth daily.  QUEtiapine (SEROQUEL) 100 mg tablet Take 100 mg by mouth nightly. Indications: Cinthya associated with Bipolar Disorder  metFORMIN (GLUCOPHAGE) 500 mg tablet Take  by mouth daily (with breakfast). Indications: type 2 diabetes mellitus  Omega-3 Fatty Acids (FISH OIL) 500 mg cap Take 1,000 mg by mouth. Indications: hypertriglyceridemia  lisinopril (PRINIVIL, ZESTRIL) 2.5 mg tablet Take 2.5 mg by mouth daily. Indications: hypertension PAST MEDICAL HISTORY: Past medical history from the initial psychiatric evaluation has been reviewed (reviewed/updated 7/29/2018) with no additional updates (I asked patient and no additional past medical history provided). Past Medical History:  
Diagnosis Date  Anxiety  Bipolar 1 disorder [facility])   
 son not sure where patient was diagnosed  Diabetes ([facility] [address]  Hypertension History reviewed. No pertinent surgical history. SOCIAL HISTORY: Social history from the initial psychiatric evaluation has been reviewed (reviewed/updated 7/29/2018) with no additional updates (I asked patient and no additional social history provided). Social History Social History  Marital status:  Spouse name: N/A  
 Number of children: N/A  
 Years of education: N/A Occupational History  Not on file. Social History Main Topics  Smoking status: Never Smoker  Smokeless tobacco: Never Used  Alcohol use No  
 Drug use: Not on file  Sexual activity: Not on file Other Topics Concern  Not on file Social History Narrative 68year old  female admitted voluntarily for psyychotic behavior. Pt has paranoid delusions. She is widodef and lives alone. She was discharged [facility] [place] 3 days ago. FAMILY HISTORY: Family history from the initial psychiatric evaluation has been reviewed (reviewed/updated 7/29/2018) with no additional updates (I asked patient and no additional family history provided). History reviewed. No pertinent family history. REVIEW OF SYSTEMS: (reviewed/updated 7/29/2018) Appetite:no change from normal  
Sleep: no change All other Review of Systems: Psychological ROS: positive for - concentration difficulties Cardiovascular ROS: no chest pain or dyspnea on exertion Gastrointestinal ROS: no abdominal pain, change in bowel habits, or black or bloody stools [place] MENTAL STATUS EXAM (MSE): MSE FINDINGS ARE WITHIN NORMAL LIMITS (WNL) UNLESS OTHERWISE STATED BELOW. ( ALL OF THE BELOW CATEGORIES OF THE MSE HAVE BEEN REVIEWED (reviewed 7/29/2018) AND UPDATED AS DEEMED APPROPRIATE ) General Presentation age appropriate, cooperative Orientation oriented to time, place and person Vital Signs  See below (reviewed 7/29/2018); Vital Signs (BP, Pulse, & Temp) are within normal limits if not listed below. Gait and Station Stable/steady, no ataxia Musculoskeletal System No extrapyramidal symptoms (EPS); no abnormal muscular movements or Tardive Dyskinesia (TD); muscle strength and tone are within normal limits Language No aphasia or dysarthria Speech:  pressured Thought Processes disorganized; fast rate of thoughts; poor abstract reasoning/computation Thought Associations loose associations and tangential  
Thought Content bizarre delusions Suicidal Ideations none Homicidal Ideations none Mood:  irritable Affect:  mood-congruent Memory recent  fair Memory remote:  fair Concentration/Attention:  distractable Fund of Knowledge average Insight:  limited Reliability poor Judgment:  limited VITALS:    
Patient Vitals for the past 24 hrs: 
 Temp Pulse Resp BP SpO2  
07/29/18 0730 98.2 °F (36.8 °C) 77 16 122/68 97 %  
07/28/18 1911 97.5 °F (36.4 °C) 82 16 127/57 96 % Wt Readings from Last 3 Encounters:  
07/25/18 69.9 kg (154 lb) 07/09/17 69.9 kg (154 lb 3.2 oz) Temp Readings from Last 3 Encounters:  
07/29/18 98.2 °F (36.8 °C)  
07/09/17 98.6 °F (37 °C) BP Readings from Last 3 Encounters:  
07/29/18 122/68  
07/09/17 190/90 Pulse Readings from Last 3 Encounters:  
07/29/18 77  
07/09/17 99 DATA LABORATORY DATA:(reviewed/updated 7/29/2018) Recent Results (from the past 24 hour(s)) GLUCOSE, POC Collection Time: 07/28/18  7:58 PM  
Result Value Ref Range Glucose (POC) 125 (H) 65 - 100 mg/dL Performed by Jeremy Roberts GLUCOSE, POC Collection Time: 07/29/18  7:43 AM  
Result Value Ref Range Glucose (POC) 103 (H) 65 - 100 mg/dL Performed by Vi Garcia GLUCOSE, POC Collection Time: 07/29/18 11:33 AM  
Result Value Ref Range Glucose (POC) 148 (H) 65 - 100 mg/dL  Performed by Jenise Loza No results found for: VALF2, VALAC, VALP, VALPR, DS6, CRBAM, CRBAMP, CARB2, XCRBAM 
No results found for: LITHM  
RADIOLOGY REPORTS:(reviewed/updated 7/29/2018) Ct Abd Pelv Wo Cont Result Date: 7/26/2018 EXAM:  CT ABDOMEN PELVIS WITHOUT CONTRAST INDICATION:  LLQ pain COMPARISON: None. . TECHNIQUE: Unenhanced multislice helical CT was performed from the diaphragm to the symphysis pubis without intravenous contrast administration. Contiguous 5 mm axial images were reconstructed and lung and soft tissue windows were generated. Coronal and sagittal reformations were generated. CT dose reduction was achieved through use of a standardized protocol tailored for this examination and automatic exposure control for dose modulation. Dora Barrio FINDINGS: INCIDENTALLY IMAGED CHEST: Heart/vessels: Within normal limits. Lungs/Pleura: Calcified nodule in the right lower lobe. . ABDOMEN: Liver: Calculus in the liver suggesting remote granulomatous disease. Gallbladder/Biliary: Status post cholecystectomy. Spleen: Calculus in the spleen suggesting remote granulomatous disease. Pancreas: Pancreatic atrophy. There are a few calculi within lymph nodes at the jacquelyn hepatis Adrenals: Within normal limits. Kidneys: Punctate calculus in the interpolar region of the right kidney. Distended renal pelves, left greater than right Peritoneum/Mesenteries: Minimal perez appearance of the central mesentery. Extraperitoneum: Within normal limits. Gastrointestinal tract: Diverticulosis in the descending and sigmoid colon. Normal-appearing appendix Vascular: Calcifications in the aorta. Right gonadal vein phleboliths. Dora Barrio PELVIS: Extraperitoneum: The floor the pelvis suggesting phleboliths. Ureters: Within normal limits. Bladder: There is a locule of gas in the antidependent portion of the bladder. Reproductive System: Status post hysterectomy. . MSK: Extensive degenerative change throughout the visualized spine, nonfocal. Osteopenia. Tiny, fat-containing umbilical hernia . IMPRESSION: 1. There is a single locule of gas in the antidependent portion of the bladder. Recommend clinical correlation for recent instrumentation versus cystitis 2. There is diverticulosis in the descending and sigmoid colon; however, there is no evidence of diverticulitis. 3. Incidental findings as above. MEDICATIONS ALL MEDICATIONS:  
Current Facility-Administered Medications Medication Dose Route Frequency  OLANZapine (ZyPREXA) tablet 5 mg  5 mg Oral QHS  lithium carbonate CR (ESKALITH CR) tablet 450 mg  450 mg Oral BID  lidocaine (LIDODERM) 5 % patch 3 Patch  3 Patch TransDERmal Q24H  
 OLANZapine (ZyPREXA) tablet 2.5 mg  2.5 mg Oral Q6H PRN  
 ziprasidone (GEODON) 10 mg in sterile water (preservative free) 0.5 mL injection  10 mg IntraMUSCular BID PRN  
 benztropine (COGENTIN) tablet 1 mg  1 mg Oral BID PRN  
 benztropine (COGENTIN) injection 1 mg  1 mg IntraMUSCular BID PRN  
 zolpidem (AMBIEN) tablet 5 mg  5 mg Oral QHS PRN  
 acetaminophen (TYLENOL) tablet 650 mg  650 mg Oral Q4H PRN  
 ibuprofen (MOTRIN) tablet 400 mg  400 mg Oral Q8H PRN  
 magnesium hydroxide (MILK OF MAGNESIA) 400 mg/5 mL oral suspension 30 mL  30 mL Oral DAILY PRN  
 nicotine (NICODERM CQ) 21 mg/24 hr patch 1 Patch  1 Patch TransDERmal DAILY PRN  
 miconazole (SECURA) 2 % extra thick cream   Topical BID  metFORMIN (GLUCOPHAGE) tablet 500 mg  500 mg Oral DAILY  LORazepam (ATIVAN) tablet 2 mg  2 mg Oral Q6H PRN Or  
 LORazepam (ATIVAN) injection 2 mg  2 mg IntraMUSCular Q6H PRN  
 diphenhydrAMINE (BENADRYL) capsule 50 mg  50 mg Oral Q6H PRN Or  
 diphenhydrAMINE (BENADRYL) injection 50 mg  50 mg IntraMUSCular Q6H PRN  
 haloperidol (HALDOL) tablet 5 mg  5 mg Oral Q6H PRN  Or  
 haloperidol lactate (HALDOL) injection 5 mg  5 mg IntraMUSCular Q6H PRN  
 insulin lispro (HUMALOG) injection   SubCUTAneous AC&HS  
 glucose chewable tablet 16 g 4 Tab Oral PRN  
 dextrose (D50W) injection syrg 12.5-25 g  12.5-25 g IntraVENous PRN  
 glucagon (GLUCAGEN) injection 1 mg  1 mg IntraMUSCular PRN  
  
SCHEDULED MEDICATIONS:  
Current Facility-Administered Medications Medication Dose Route Frequency  OLANZapine (ZyPREXA) tablet 5 mg  5 mg Oral QHS  lithium carbonate CR (ESKALITH CR) tablet 450 mg  450 mg Oral BID  lidocaine (LIDODERM) 5 % patch 3 Patch  3 Patch TransDERmal Q24H  
 miconazole (SECURA) 2 % extra thick cream   Topical BID  metFORMIN (GLUCOPHAGE) tablet 500 mg  500 mg Oral DAILY  insulin lispro (HUMALOG) injection   SubCUTAneous AC&HS  
  
 
 
ASSESSMENT & PLAN  
 
DIAGNOSES REQUIRING ACTIVE TREATMENT AND MONITORING: (reviewed/updated 7/29/2018) Patient Active Hospital Problem List: 
 Bipolar disorder ([facility] (7/26/2018) Assessment: kar alternating with depression Plan: mood stabilizer and antispychotic Continue Eskalith  
7/28- Start Olanzapine 2.5mg at night 7/29- increase Olanzapine to 5mg at night, continue lithium I will continue to monitor blood levels (Depakote, Tegretol, lithium, clozapine---a drug with a narrow therapeutic index= NTI) and associated labs for drug therapy implemented that require intense monitoring for toxicity as deemed appropriate based on current medication side effects and pharmacodynamically determined drug 1/2 lives. In summary, Óscar Morgan, is a 68 y.o.  female who presents with a severe exacerbation of the principal diagnosis of Bipolar disorder ([facility] Patient's condition is worsening/not improving/not stable . Patient requires continued inpatient hospitalization for further stabilization, safety monitoring and medication management. I will continue to coordinate the provision of individual, milieu, occupational, group, and substance abuse therapies to address target symptoms/diagnoses as deemed appropriate for the individual patient.   A coordinated, multidisplinary treatment team round was conducted with the patient (this team consists of the nurse, psychiatric unit pharmcist,  and writer). Complete current electronic health record for patient has been reviewed today including consultant notes, ancillary staff notes, nurses and psychiatric tech notes. Suicide risk assessment completed and patient deemed to be of low risk for suicide at this time. The following regarding medications was addressed during rounds with patient:  
the risks and benefits of the proposed medication. The patient was given the opportunity to ask questions. Informed consent given to the use of the above medications. Will continue to adjust psychiatric and non-psychiatric medications (see above \"medication\" section and orders section for details) as deemed appropriate & based upon diagnoses and response to treatment. I will continue to order blood tests/labs and diagnostic tests as deemed appropriate and review results as they become available (see orders for details and above listed lab/test results). I will order psychiatric records from previous [facility] hospitals to further elucidate the nature of patient's psychopathology and review once available. I will gather additional collateral information from friends, family and o/p treatment team to further elucidate the nature of patient's psychopathology and baselline level of psychiatric functioning. I certify that this patient's inpatient psychiatric hospital services furnished since the previous certification were, and continue to be, required for treatment that could reasonably be expected to improve the patient's condition, or for diagnostic study, and that the patient continues to need, on a daily basis, active treatment furnished directly by or requiring the supervision of inpatient psychiatric facility personnel.  In addition, the hospital records show that services furnished were intensive treatment services, admission or related services, or equivalent services. EXPECTED DISCHARGE DATE/DAY: TBD  
 
DISPOSITION: Home Signed By:  
Indu Yu MD 
7/29/2018

## 2018-07-30 ENCOUNTER — APPOINTMENT (OUTPATIENT)
Dept: CT IMAGING | Age: 73
DRG: 885 | End: 2018-07-30
Attending: FAMILY MEDICINE
Payer: MEDICARE

## 2018-07-30 LAB
APPEARANCE UR: CLEAR
BACTERIA URNS QL MICRO: ABNORMAL /HPF
BILIRUB UR QL: NEGATIVE
COLOR UR: ABNORMAL
EPITH CASTS URNS QL MICRO: ABNORMAL /LPF
GLUCOSE BLD STRIP.AUTO-MCNC: 113 MG/DL (ref 65–100)
GLUCOSE BLD STRIP.AUTO-MCNC: 117 MG/DL (ref 65–100)
GLUCOSE BLD STRIP.AUTO-MCNC: 134 MG/DL (ref 65–100)
GLUCOSE BLD STRIP.AUTO-MCNC: 99 MG/DL (ref 65–100)
GLUCOSE UR STRIP.AUTO-MCNC: NEGATIVE MG/DL
HGB UR QL STRIP: NEGATIVE
KETONES UR QL STRIP.AUTO: NEGATIVE MG/DL
LEUKOCYTE ESTERASE UR QL STRIP.AUTO: NEGATIVE
NITRITE UR QL STRIP.AUTO: NEGATIVE
PH UR STRIP: 5.5 [PH] (ref 5–8)
PROT UR STRIP-MCNC: NEGATIVE MG/DL
RBC #/AREA URNS HPF: ABNORMAL /HPF (ref 0–5)
SERVICE CMNT-IMP: ABNORMAL
SERVICE CMNT-IMP: NORMAL
SP GR UR REFRACTOMETRY: 1.01 (ref 1–1.03)
UR CULT HOLD, URHOLD: NORMAL
UROBILINOGEN UR QL STRIP.AUTO: 1 EU/DL (ref 0.2–1)
WBC URNS QL MICRO: ABNORMAL /HPF (ref 0–4)

## 2018-07-30 PROCEDURE — 87186 SC STD MICRODIL/AGAR DIL: CPT | Performed by: HOSPITALIST

## 2018-07-30 PROCEDURE — 97116 GAIT TRAINING THERAPY: CPT

## 2018-07-30 PROCEDURE — 81001 URINALYSIS AUTO W/SCOPE: CPT | Performed by: PSYCHIATRY & NEUROLOGY

## 2018-07-30 PROCEDURE — 74011000250 HC RX REV CODE- 250: Performed by: FAMILY MEDICINE

## 2018-07-30 PROCEDURE — 82962 GLUCOSE BLOOD TEST: CPT

## 2018-07-30 PROCEDURE — 65220000003 HC RM SEMIPRIVATE PSYCH

## 2018-07-30 PROCEDURE — 87077 CULTURE AEROBIC IDENTIFY: CPT | Performed by: HOSPITALIST

## 2018-07-30 PROCEDURE — 74011250636 HC RX REV CODE- 250/636: Performed by: FAMILY MEDICINE

## 2018-07-30 PROCEDURE — 74011250637 HC RX REV CODE- 250/637: Performed by: PSYCHIATRY & NEUROLOGY

## 2018-07-30 PROCEDURE — 97161 PT EVAL LOW COMPLEX 20 MIN: CPT

## 2018-07-30 PROCEDURE — 87086 URINE CULTURE/COLONY COUNT: CPT | Performed by: HOSPITALIST

## 2018-07-30 RX ORDER — PHENAZOPYRIDINE HYDROCHLORIDE 100 MG/1
200 TABLET, FILM COATED ORAL 2 TIMES DAILY
Status: DISCONTINUED | OUTPATIENT
Start: 2018-07-30 | End: 2018-08-01 | Stop reason: HOSPADM

## 2018-07-30 RX ORDER — LITHIUM CARBONATE 450 MG/1
900 TABLET ORAL
Status: DISCONTINUED | OUTPATIENT
Start: 2018-07-30 | End: 2018-08-01 | Stop reason: HOSPADM

## 2018-07-30 RX ORDER — OLANZAPINE 5 MG/1
10 TABLET ORAL
Status: DISCONTINUED | OUTPATIENT
Start: 2018-07-30 | End: 2018-08-01 | Stop reason: HOSPADM

## 2018-07-30 RX ADMIN — DIPHENHYDRAMINE HYDROCHLORIDE 50 MG: 50 CAPSULE ORAL at 03:55

## 2018-07-30 RX ADMIN — MICONAZOLE NITRATE: 20 CREAM TOPICAL at 17:00

## 2018-07-30 RX ADMIN — PHENAZOPYRIDINE HYDROCHLORIDE 200 MG: 100 TABLET ORAL at 21:11

## 2018-07-30 RX ADMIN — ACETAMINOPHEN 650 MG: 325 TABLET, FILM COATED ORAL at 10:12

## 2018-07-30 RX ADMIN — MICONAZOLE NITRATE: 20 CREAM TOPICAL at 09:27

## 2018-07-30 RX ADMIN — LIDOCAINE HYDROCHLORIDE 1 G: 10 INJECTION, SOLUTION EPIDURAL; INFILTRATION; INTRACAUDAL; PERINEURAL at 22:16

## 2018-07-30 RX ADMIN — ACETAMINOPHEN 650 MG: 325 TABLET, FILM COATED ORAL at 14:21

## 2018-07-30 RX ADMIN — ZOLPIDEM TARTRATE 5 MG: 5 TABLET ORAL at 21:10

## 2018-07-30 NOTE — BH NOTES
PRN Medication Documentation At 1425 Specific patient behavior that led to need for PRN medication: toothache lower right wisdom tooth area, 7/10 Staff interventions attempted prior to PRN being given: distraction, PRN medication given: Tylenol PO Patient response/effectiveness of PRN medication: Patient reassessed, states pain remains a 5.

## 2018-07-30 NOTE — BH NOTES
UA resulted WBC 5-10, bacteria +1, leuk est neg. Paged to ask hospitalist about need for consult. Spoke to Dr Ariel Negro about pt UA result and hospitalist consult

## 2018-07-30 NOTE — PROGRESS NOTES
Problem: Falls - Risk of 
Goal: *Absence of Falls Document Maryuri Blanco Fall Risk and appropriate interventions in the flowsheet. Outcome: Progressing Towards Goal 
Fall Risk Interventions: 
Mobility Interventions: Bed/chair exit alarm, Communicate number of staff needed for ambulation/transfer, Utilize walker, cane, or other assistive device Mentation Interventions: Adequate sleep, hydration, pain control Medication Interventions: Bed/chair exit alarm, Teach patient to arise slowly Elimination Interventions: Bed/chair exit alarm, Patient to call for help with toileting needs Pt is ambulating from room to dayroom without assistance.

## 2018-07-30 NOTE — PROGRESS NOTES
Problem: Altered Thought Process (Adult/Pediatric) Goal: *STG: Seeks staff when feelings of anxiety and fear arise Outcome: Progressing Towards Goal 
Pt is up walking steadily with a quad cane. She is friendly, talkative and smiling. She denies SI. Staff will continue to encourage pt to share feelings and give emotional support and encouragement, continue q15 checks.

## 2018-07-30 NOTE — BH NOTES
Paged  Hospitalist to consult for possible UTI as ordered on day shift by Dr Nathanael Kohler. Hospitalist asks that we collect UA as ordered by psychiatrist and if abnormal, call back for hospitalist consult. Dr Nathanael Kohler agreed. Nurse encouraged pt that we need to collect a UA. Patient agreed.

## 2018-07-30 NOTE — PROGRESS NOTES
Problem: Altered Thought Process (Adult/Pediatric) Goal: *STG: Participates in treatment plan Outcome: Progressing Towards Goal 
Talkative. Refused scheduled po meds. Patient denies SI. Up ad margarito with cane. Patient steady gait. Patient ate all meals. Patient cooperative and laughing with staff and peers. Patient does not state goal. Staff goal: to encourage patient to go to groups and take meds. Goal: *STG: Complies with medication therapy Outcome: Not Progressing Towards Goal 
Variance: Patient slowly responding Selectively compliant with meds Goal: Interventions Outcome: Progressing Towards Goal 
Medication management. Q 15 min safety rounds. Group therapy.

## 2018-07-30 NOTE — INTERDISCIPLINARY ROUNDS
Behavioral Health Interdisciplinary Rounds Patient Name: Gasper Burden  Age: 68 y.o. Room/Bed:  742/01 Primary Diagnosis: Bipolar disorder ([facility] [address] Admission Status: Voluntary Readmission within 30 days: yes Power of  in place: no 
Patient requires a blocked bed: no          Reason for blocked bed: VTE Prophylaxis: No 
 
Mobility needs/Fall risk: yes Nutritional Plan: no 
Consults:         
Labs/Testing due today?: no 
 
Sleep hours: 3.25 Participation in Care/Groups:  yes Medication Compliant?: Yes PRNS (last 24 hours): Pain Restraints (last 24 hours):  no 
  
CIWA (range last 24 hours): COWS (range last 24 hours): Alcohol screening (AUDIT) completed -   AUDIT Score: 0 If applicable, date SBIRT discussed in treatment team AND documented:  
 
Tobacco - patient is a smoker: Have You Used Tobacco in the Past 30 Days: No 
Illegal Drugs use: Have You Used Any Illegal Substances Over the Past 12 Months: No 
 
24 hour chart check complete: yes Patient goal(s) for today: Engage in unit activities Treatment team focus/goals: Call son Progress note: Pt presenting with slowed down, thoughtful speech. Doing much better LOS:  4  Expected LOS: 6 Financial concerns/prescription coverage:  Medicare Date of last family contact: None Family requesting physician contact today:  No 
Discharge plan: Return home Guns in the home: No      
Outpatient provider(s): MARYD Participating treatment team members: Deisy Moreno MSW; Dr. Holland Dexter MD; Anjana Guevara RN; Eleni Hough, LizaD

## 2018-07-30 NOTE — PROGRESS NOTES
Problem: Mobility Impaired (Adult and Pediatric) Goal: *Acute Goals and Plan of Care (Insert Text) Physical Therapy Goals Initiated 7/30/2018 1. Patient will move from supine to sit and sit to supine  in bed with independence within 7 day(s). 2.  Patient will transfer from bed to chair and chair to bed with modified independence using the least restrictive device within 7 day(s). 3.  Patient will perform sit to stand with modified independence within 7 day(s). 4.  Patient will ambulate with modified independence for 200 feet with the least restrictive device within 7 day(s). physical Therapy EVALUATION Patient: Tessa Peterson (68 y.o. female) Date: 7/30/2018 Primary Diagnosis: Psychosis Precautions:     
 
ASSESSMENT : 
Based on the objective data described below, the patient presents with c/o left groin and thigh pain with antalgic gait following admission for psychosis. Prior to admission this patient was modified independent with a cane and living in an apartment. She endorses pain in the left hip/knee for the last 8 months. During eval, this patient was quite talkative, difficult to focus, and with guarded movement of the leg. She appears to have increased quad turgor and tenderness, hamstring tightness, and ? Impaired motion at the hip (though comparable bilaterally). Gait with slow amanda and antalgic steps but without LOB using a single point cane. Will follow 3x wk acutely and she would benefit from outpatient PT following discharge if this is a persistent problem. Patient will benefit from skilled intervention to address the above impairments. Patients rehabilitation potential is considered to be Good Factors which may influence rehabilitation potential include:  
[x]         None noted 
[]         Mental ability/status []         Medical condition 
[]         Home/family situation and support systems 
[]         Safety awareness 
[]         Pain tolerance/management 
[] Other: PLAN : 
Recommendations and Planned Interventions: 
[x]           Bed Mobility Training             [x]    Neuromuscular Re-Education 
[x]           Transfer Training                   []    Orthotic/Prosthetic Training 
[x]           Gait Training                         []    Modalities [x]           Therapeutic Exercises           []    Edema Management/Control 
[x]           Therapeutic Activities            []    Patient and Family Training/Education 
[]           Other (comment): Frequency/Duration: Patient will be followed by physical therapy  3 times a week to address goals. Discharge Recommendations: Outpatient Further Equipment Recommendations for Discharge: None SUBJECTIVE:  
Patient stated I am feeling better. I can walk a bit.  OBJECTIVE DATA SUMMARY:  
HISTORY:   
Past Medical History:  
Diagnosis Date  Anxiety  Bipolar 1 disorder [facility])   
 son not sure where patient was diagnosed  Diabetes ([facility] [address]  Hypertension History reviewed. No pertinent surgical history. Prior Level of Function/Home Situation: modified independent with cane Personal factors and/or comorbidities impacting plan of care:  
 
Home Situation Home Environment: Apartment One/Two Story Residence: One story Living Alone: Yes Support Systems: Family member(s) Patient Expects to be Discharged to[de-identified] Unknown Current DME Used/Available at Home: Cane, straight EXAMINATION/PRESENTATION/DECISION MAKING:  
Critical Behavior: 
Neurologic State: Alert Orientation Level: Disoriented to situation Cognition: Decreased attention/concentration, Follows commands, Impaired decision making, Poor safety awareness Hearing: Auditory Auditory Impairment: None Skin:   
Edema:  
Range Of Motion: 
AROM: Generally decreased, functional 
  
  
  
  
  
  
  
Strength:   
Strength: Generally decreased, functional 
  
  
  
  
  
  
Tone & Sensation: Coordination: 
Coordination: Within functional limits Functional Mobility: 
Balance:  
Sitting: Intact Standing: Impaired Standing - Static: Fair Standing - Dynamic : Fair Ambulation/Gait Training: 
Distance (ft): 60 Feet (ft) Assistive Device: Gait belt;Cane, straight Ambulation - Level of Assistance: Minimal assistance;Contact guard assistance Gait Description (WDL): Exceptions to [facility] Gait Abnormalities: Antalgic;Decreased step clearance Base of Support: Widened Speed/Hetal: Slow Functional Measure: 
Tinetti test: 
 
Sitting Balance: 1 Arises: 1 Attempts to Rise: 2 Immediate Standing Balance: 1 Standing Balance: 1 Nudged: 1 Eyes Closed: 1 Turn 360 Degrees - Continuous/Discontinuous: 1 Turn 360 Degrees - Steady/Unsteady: 1 Sitting Down: 1 Balance Score: 11 Indication of Gait: 1 
R Step Length/Height: 1 L Step Length/Height: 0 
R Foot Clearance: 1 L Foot Clearance: 1 Step Symmetry: 1 Step Continuity: 1 Path: 1 Trunk: 0 Walking Time: 0 Gait Score: 7 Total Score: 18 Tinetti Test and G-code impairment scale: 
Percentage of Impairment CH 
 
0% 
 CI 
 
1-19% CJ 
 
20-39% CK 
 
40-59% CL 
 
60-79% CM 
 
80-99% CN  
 
100% Tinetti Score 0-28 28 23-27 17-22 12-16 6-11 1-5 0 Tinetti Tool Score Risk of Falls 
<19 = High Fall Risk 19-24 = Moderate Fall Risk 25-28 = Low Fall Risk Tinetti ME. Performance-Oriented Assessment of Mobility Problems in Elderly Patients. Tim 66; [id number]. (Scoring Description: PT Bulletin Feb. 10, 1993) Older adults: Yue Aguirre et al, 2009; n = [address] elderly evaluated with ABC, ROLANDO, ADL, and IADL) · Mean ROLANDO score for males aged 69-68 years = 26.21(3.40) · Mean ROLANDO score for females age 69-68 years = 25.16(4.30) · Mean ROLANDO score for males over 80 years = 23.29(6.02) · Mean ROLANDO score for females over 80 years = 17.20(8.32) G codes: In compliance with CMSs Claims Based Outcome Reporting, the following G-code set was chosen for this patient based on their primary functional limitation being treated: The outcome measure chosen to determine the severity of the functional limitation was the Tinetti with a score of 18/28 which was correlated with the impairment scale. ? Mobility - Walking and Moving Around:  
  - CURRENT STATUS: CJ - 20%-39% impaired, limited or restricted  - GOAL STATUS: CI - 1%-19% impaired, limited or restricted  - D/C STATUS:  ---------------To be determined--------------- Physical Therapy Evaluation Charge Determination History Examination Presentation Decision-Making MEDIUM  Complexity : 1-2 comorbidities / personal factors will impact the outcome/ POC  LOW Complexity : 1-2 Standardized tests and measures addressing body structure, function, activity limitation and / or participation in recreation  LOW Complexity : Stable, uncomplicated  LOW Complexity : FOTO score of  Based on the above components, the patient evaluation is determined to be of the following complexity level: LOW Pain: 
Pain Scale 1: Numeric (0 - 10) Pain Intensity 1: 7 Pain Location 1: Teeth Pain Orientation 1: Posterior Pain Description 1: Aching; Throbbing Pain Intervention(s) 1: Medication (see MAR) Activity Tolerance:  
 
Please refer to the flowsheet for vital signs taken during this treatment. After treatment:  
[x]         Patient left in no apparent distress sitting up in chair 
[]         Patient left in no apparent distress in bed 
[x]         Call bell left within reach [x]         Nursing notified 
[]         Caregiver present 
[]         Bed alarm activated COMMUNICATION/EDUCATION:  
The patients plan of care was discussed with: Registered Nurse. [x]         Fall prevention education was provided and the patient/caregiver indicated understanding. [x]         Patient/family have participated as able in goal setting and plan of care.  
[x] Patient/family agree to work toward stated goals and plan of care. []         Patient understands intent and goals of therapy, but is neutral about his/her participation. []         Patient is unable to participate in goal setting and plan of care. Thank you for this referral. 
Giuliana Melton, PT, DPT Time Calculation: 17 mins

## 2018-07-30 NOTE — BH NOTES
PRN Medication Documentation Specific patient behavior that led to need for PRN medication: neck pain Staff interventions attempted prior to PRN being given: sitting down and relaxing PRN medication given: motrin Patient response/effectiveness of PRN medication: well, pt stated better after just half an hour

## 2018-07-30 NOTE — BH NOTES
PRN Medication Documentation Specific patient behavior that led to need for PRN medication: restless, not sleeping Staff interventions attempted prior to PRN being given: quiet room with lights dimmed PRN medication given: Benadryl Patient response/effectiveness of PRN medication: Will continue to monitor. Pt sleeping after 50 minutes

## 2018-07-30 NOTE — PROGRESS NOTES
Laboratory Monitoring for Antipsychotics: This patient is currently prescribed the following medication(s):  
Current Facility-Administered Medications Medication Dose Route Frequency  OLANZapine (ZyPREXA) tablet 10 mg  10 mg Oral QHS  lithium carbonate CR (ESKALITH CR) tablet 900 mg  900 mg Oral QHS  phenazopyridine (PYRIDIUM) tablet 200 mg  200 mg Oral BID  lidocaine (LIDODERM) 5 % patch 3 Patch  3 Patch TransDERmal Q24H  
 miconazole (SECURA) 2 % extra thick cream   Topical BID  metFORMIN (GLUCOPHAGE) tablet 500 mg  500 mg Oral DAILY  insulin lispro (HUMALOG) injection   SubCUTAneous AC&HS The following labs have been completed for monitoring of antipsychotics and/or mood stabilizers: 
 
Height, Weight, BMI Estimation Estimated body mass index is 25.63 kg/(m^2) as calculated from the following: 
  Height as of this encounter: 165.1 cm (65\"). Weight as of this encounter: 69.9 kg (154 lb). Vital Signs/Blood Pressure Visit Vitals  /76 (BP Patient Position: Sitting)  Pulse 86  Temp 98.2 °F (36.8 °C)  Resp 16  
 Ht 165.1 cm (65\")  Wt 69.9 kg (154 lb)  SpO2 98%  Breastfeeding No  
 BMI 25.63 kg/m2 Renal Function, Hepatic Function and Chemistry Estimated Creatinine Clearance: 80.7 mL/min (based on Cr of 0.61). Lab Results Component Value Date/Time Sodium 142 07/25/2018 11:21 PM  
 Potassium 3.0 (L) 07/25/2018 11:21 PM  
 Chloride 104 07/25/2018 11:21 PM  
 CO2 28 07/25/2018 11:21 PM  
 Anion gap 10 07/25/2018 11:21 PM  
 BUN 14 07/25/2018 11:21 PM  
 Creatinine 0.61 07/25/2018 11:21 PM  
 BUN/Creatinine ratio 23 (H) 07/25/2018 11:21 PM  
 Bilirubin, total 0.5 07/25/2018 11:21 PM  
 Protein, total 7.0 07/25/2018 11:21 PM  
 Albumin 3.6 07/25/2018 11:21 PM  
 Globulin 3.4 07/25/2018 11:21 PM  
 A-G Ratio 1.1 07/25/2018 11:21 PM  
 ALT (SGPT) 33 07/25/2018 11:21 PM  
 Alk. phosphatase 57 07/25/2018 11:21 PM  
 
Lab Results Component Value Date/Time Glucose 114 (H) 07/27/2018 06:00 AM  
 Glucose (POC) 113 (H) 07/30/2018 11:33 AM  
 
Lab Results Component Value Date/Time Hemoglobin A1c 6.3 07/26/2018 03:30 PM  
 
Hematology Lab Results Component Value Date/Time WBC 5.7 07/25/2018 11:21 PM  
 RBC 4.10 07/25/2018 11:21 PM  
 HGB 12.7 07/25/2018 11:21 PM  
 HCT 37.3 07/25/2018 11:21 PM  
 MCV 91.0 07/25/2018 11:21 PM  
 MCH 31.0 07/25/2018 11:21 PM  
 MCHC 34.0 07/25/2018 11:21 PM  
 RDW 11.7 07/25/2018 11:21 PM  
 PLATELET 358 64/24/4579 11:21 PM  
 
Lipids Lab Results Component Value Date/Time Cholesterol, total 186 07/27/2018 06:00 AM  
 HDL Cholesterol 36 07/27/2018 06:00 AM  
 LDL, calculated 119.2 (H) 07/27/2018 06:00 AM  
 Triglyceride 154 (H) 07/27/2018 06:00 AM  
 CHOL/HDL Ratio 5.2 (H) 07/27/2018 06:00 AM  
 
Thyroid Function Lab Results Component Value Date/Time TSH 1.04 07/25/2018 11:21 PM  
 
Assessment/Plan: 
Recommended baseline laboratory monitoring has been completed based on this patient's current medication regimen. The patient's estimated 10-year ASCVD risk is 29.9%. Based on this value (>7.5%), the patient is a candidate for a high-intensity statin. Recommend initiating atorvastatin 40mg daily and increasing metformin to 500mg BID. Santiago Schumacher, PharmD, BCPP, BCPS Clinical Pharmacy Specialist, [facility]

## 2018-07-30 NOTE — BH NOTES
PRN Medication Documentation Specific patient behavior that led to need for PRN medication: patient states has toothache from wisdom teeth, pain level is 7/10. Desires pain level of 4. Staff interventions attempted prior to PRN being given: distraction PRN medication given: acetaminophen PO Patient response/effectiveness of PRN medication: will reassess pain in one hour 1104 Patient's pain reassessed, now reports 3/10, pain reduced, medication [id number]

## 2018-07-30 NOTE — BH NOTES
PSYCHIATRIC PROGRESS NOTE Patient Name  Paula Steen Date of Birth 1945 [facility] [id number] Medical Record Number  [id number] Age  68 y.o. PCP None Admit date:  7/25/2018 Room Number  166/79  @ [facility]  
Date of Service  7/30/2018 PSYCHOTHERAPY SESSION NOTE: 
Length of psychotherapy session: 20 minutes Main condition/diagnosis/issues treated during session today, 7/30/2018 : kar, medications, coping skills I employed Cognitive Behavioral therapy techniques, Reality-Oriented psychotherapy, as well as supportive psychotherapy in regards to various ongoing psychosocial stressors, including the following: pre-admission and current problems; medical issues; and stress of hospitalization. Interpersonal relationship issues and psychodynamic conflicts explored. Attempts made to alleviate maladaptive patterns. We, also, worked on issues of denial & effects of substance dependency/use Overall, patient is not progressing Treatment Plan Update (reviewed an updated 7/30/2018) : I will modify psychotherapy tx plan by implementing more stress management strategies, building upon cognitive behavioral techniques, increasing coping skills, as well as shoring up psychological defenses). An extended energy and skill set was needed to engage pt in psychotherapy due to some of the following: resistiveness, complexity, negativity, confrontational nature, hostile behaviors, and/or severe abnormalities in thought processes/psychosis resulting in the loss of expressive/receptive language communication skills. E & M PROGRESS NOTE: 
  
 
 
HISTORY  
   
CC:  \"kar\" HISTORY OF PRESENT ILLNESS/INTERVAL HISTORY:  (reviewed/updated 7/30/2018). per initial evaluation: The patient, Paula Steen, is a 68 y.o.   WHITE [place]  female with a past psychiatric history significant for bipolar disorder , who presents at this time with complaints of (and/or evidence of) the following emotional symptoms: cinthya. Additional symptomatology include agitation. The above symptoms have been present for years. These symptoms are of severe severity. These symptoms are constant  in nature. The patient's condition has been precipitated by medication non complianceand psychosocial stressors (arguing with selene  ). Patient's condition made worse by  treatment noncompliance. UDS: +negative; BAL=0 Brittni Pa presents/reports/evidences the following emotional symptoms today, 7/30/2018:cinthya. The above symptoms have been present for years. These symptoms are of severe severity. The symptoms are constant  in nature. Additional symptomatology and features include pressured speech, looseness of assoc. , psychosis, mood lability. (+)prns. Poor insight 7/31 18- Much improved regarding cognitive disorganization and pressured speech. Demonstarting awareness that medication has been helplful. No prn's. SIDE EFFECTS: (reviewed/updated 7/30/2018) None reported or admitted to. No noted toxicity with use of Depakote/Tegretol/lithium/Clozaril/TCAs ALLERGIES:(reviewed/updated 7/30/2018) No Known Allergies MEDICATIONS PRIOR TO ADMISSION:(reviewed/updated 7/30/2018) Prescriptions Prior to Admission Medication Sig  cholecalciferol (VITAMIN D3) 1,000 unit tablet Take 1,000 Units by mouth daily.  QUEtiapine (SEROQUEL) 100 mg tablet Take 100 mg by mouth nightly. Indications: Cinthya associated with Bipolar Disorder  metFORMIN (GLUCOPHAGE) 500 mg tablet Take  by mouth daily (with breakfast). Indications: type 2 diabetes mellitus  Omega-3 Fatty Acids (FISH OIL) 500 mg cap Take 1,000 mg by mouth. Indications: hypertriglyceridemia  lisinopril (PRINIVIL, ZESTRIL) 2.5 mg tablet Take 2.5 mg by mouth daily. Indications: hypertension PAST MEDICAL HISTORY: Past medical history from the initial psychiatric evaluation has been reviewed (reviewed/updated 7/30/2018) with no additional updates (I asked patient and no additional past medical history provided). Past Medical History:  
Diagnosis Date  Anxiety  Bipolar 1 disorder [facility])   
 son not sure where patient was diagnosed  Diabetes ([facility] [address]  Hypertension History reviewed. No pertinent surgical history. SOCIAL HISTORY: Social history from the initial psychiatric evaluation has been reviewed (reviewed/updated 7/30/2018) with no additional updates (I asked patient and no additional social history provided). Social History Social History  Marital status:  Spouse name: N/A  
 Number of children: N/A  
 Years of education: N/A Occupational History  Not on file. Social History Main Topics  Smoking status: Never Smoker  Smokeless tobacco: Never Used  Alcohol use No  
 Drug use: Not on file  Sexual activity: Not on file Other Topics Concern  Not on file Social History Narrative 68year old  female admitted voluntarily for psyychotic behavior. Pt has paranoid delusions. She is widodef and lives alone. She was discharged from [facility] days ago. FAMILY HISTORY: Family history from the initial psychiatric evaluation has been reviewed (reviewed/updated 7/30/2018) with no additional updates (I asked patient and no additional family history provided). History reviewed. No pertinent family history. REVIEW OF SYSTEMS: (reviewed/updated 7/30/2018) Appetite:no change from normal  
Sleep: no change All other Review of Systems: Psychological ROS: positive for - concentration difficulties Cardiovascular ROS: no chest pain or dyspnea on exertion Gastrointestinal ROS: no abdominal pain, change in bowel habits, or black or bloody stools [facility] MENTAL STATUS EXAM (MSE): MSE FINDINGS ARE WITHIN NORMAL LIMITS (WNL) UNLESS OTHERWISE STATED BELOW. ( ALL OF THE BELOW CATEGORIES OF THE MSE HAVE BEEN REVIEWED (reviewed 7/30/2018) AND UPDATED AS DEEMED APPROPRIATE ) General Presentation age appropriate, cooperative Orientation oriented to time, place and person Vital Signs  See below (reviewed 7/30/2018); Vital Signs (BP, Pulse, & Temp) are within normal limits if not listed below. Gait and Station Stable/steady, no ataxia Musculoskeletal System No extrapyramidal symptoms (EPS); no abnormal muscular movements or Tardive Dyskinesia (TD); muscle strength and tone are within normal limits Language No aphasia or dysarthria Speech:  pressured Thought Processes disorganized; fast rate of thoughts; poor abstract reasoning/computation Thought Associations loose associations and tangential  
Thought Content bizarre delusions Suicidal Ideations none Homicidal Ideations none Mood:  irritable Affect:  mood-congruent Memory recent  fair Memory remote:  fair Concentration/Attention:  distractable Fund of Knowledge average Insight:  limited Reliability poor Judgment:  limited VITALS:    
Patient Vitals for the past 24 hrs: 
 Temp Pulse Resp BP SpO2  
07/30/18 0835 98.2 °F (36.8 °C) 86 16 146/76 98 %  
07/29/18 1938 98.2 °F (36.8 °C) 75 16 129/70 98 %  
07/29/18 1658 98.6 °F (37 °C) 73 16 114/77 97 % Wt Readings from Last 3 Encounters:  
07/25/18 69.9 kg (154 lb) 07/09/17 69.9 kg (154 lb 3.2 oz) Temp Readings from Last 3 Encounters:  
07/30/18 98.2 °F (36.8 °C)  
07/09/17 98.6 °F (37 °C) BP Readings from Last 3 Encounters:  
07/30/18 146/76  
07/09/17 190/90 Pulse Readings from Last 3 Encounters:  
07/30/18 86  
07/09/17 99 DATA LABORATORY DATA:(reviewed/updated 7/30/2018) Recent Results (from the past 24 hour(s)) GLUCOSE, POC Collection Time: 07/29/18  4:36 PM  
Result Value Ref Range Glucose (POC) 104 (H) 65 - 100 mg/dL Performed by Ashly Hernandez   
GLUCOSE, POC  Collection Time: 07/29/18  8:24 PM  
Result Value Ref Range Glucose (POC) 128 (H) 65 - 100 mg/dL Performed by Jacob Cardenas, POC Collection Time: 07/30/18  7:28 AM  
Result Value Ref Range Glucose (POC) 99 65 - 100 mg/dL Performed by Charu Boykin GLUCOSE, POC Collection Time: 07/30/18 11:33 AM  
Result Value Ref Range Glucose (POC) 113 (H) 65 - 100 mg/dL Performed by Rahat Badillo   
 
No results found for: VALF2, VALAC, VALP, VALPR, DS6, CRBAM, CRBAMP, CARB2, XCRBAM 
No results found for: LITHM  
RADIOLOGY REPORTS:(reviewed/updated 7/30/2018) Ct Abd Pelv Wo Cont Result Date: 7/26/2018 EXAM:  CT ABDOMEN PELVIS WITHOUT CONTRAST INDICATION:  LLQ pain COMPARISON: None. . TECHNIQUE: Unenhanced multislice helical CT was performed from the diaphragm to the symphysis pubis without intravenous contrast administration. Contiguous 5 mm axial images were reconstructed and lung and soft tissue windows were generated. Coronal and sagittal reformations were generated. CT dose reduction was achieved through use of a standardized protocol tailored for this examination and automatic exposure control for dose modulation. Patrick Pedraza FINDINGS: INCIDENTALLY IMAGED CHEST: Heart/vessels: Within normal limits. Lungs/Pleura: Calcified nodule in the right lower lobe. . ABDOMEN: Liver: Calculus in the liver suggesting remote granulomatous disease. Gallbladder/Biliary: Status post cholecystectomy. Spleen: Calculus in the spleen suggesting remote granulomatous disease. Pancreas: Pancreatic atrophy. There are a few calculi within lymph nodes at the jacquelyn hepatis Adrenals: Within normal limits. Kidneys: Punctate calculus in the interpolar region of the right kidney. Distended renal pelves, left greater than right Peritoneum/Mesenteries: Minimal perez appearance of the central mesentery. Extraperitoneum: Within normal limits. Gastrointestinal tract: Diverticulosis in the descending and sigmoid colon. Normal-appearing appendix Vascular: Calcifications in the aorta.  Right gonadal vein phleboliths. [facility] [place] PELVIS: Extraperitoneum: The floor the pelvis suggesting phleboliths. Ureters: Within normal limits. Bladder: There is a locule of gas in the antidependent portion of the bladder. Reproductive System: Status post hysterectomy. . MSK: Extensive degenerative change throughout the visualized spine, nonfocal. Osteopenia. Tiny, fat-containing umbilical hernia . IMPRESSION: 1. There is a single locule of gas in the antidependent portion of the bladder. Recommend clinical correlation for recent instrumentation versus cystitis 2. There is diverticulosis in the descending and sigmoid colon; however, there is no evidence of diverticulitis. 3. Incidental findings as above. MEDICATIONS ALL MEDICATIONS:  
Current Facility-Administered Medications Medication Dose Route Frequency  OLANZapine (ZyPREXA) tablet 10 mg  10 mg Oral QHS  lithium carbonate CR (ESKALITH CR) tablet 900 mg  900 mg Oral QHS  phenazopyridine (PYRIDIUM) tablet 200 mg  200 mg Oral BID  lidocaine (LIDODERM) 5 % patch 3 Patch  3 Patch TransDERmal Q24H  
 OLANZapine (ZyPREXA) tablet 2.5 mg  2.5 mg Oral Q6H PRN  
 ziprasidone (GEODON) 10 mg in sterile water (preservative free) 0.5 mL injection  10 mg IntraMUSCular BID PRN  
 benztropine (COGENTIN) tablet 1 mg  1 mg Oral BID PRN  
 benztropine (COGENTIN) injection 1 mg  1 mg IntraMUSCular BID PRN  
 zolpidem (AMBIEN) tablet 5 mg  5 mg Oral QHS PRN  
 acetaminophen (TYLENOL) tablet 650 mg  650 mg Oral Q4H PRN  
 ibuprofen (MOTRIN) tablet 400 mg  400 mg Oral Q8H PRN  
 magnesium hydroxide (MILK OF MAGNESIA) 400 mg/5 mL oral suspension 30 mL  30 mL Oral DAILY PRN  
 nicotine (NICODERM CQ) 21 mg/24 hr patch 1 Patch  1 Patch TransDERmal DAILY PRN  
 miconazole (SECURA) 2 % extra thick cream   Topical BID  metFORMIN (GLUCOPHAGE) tablet 500 mg  500 mg Oral DAILY  LORazepam (ATIVAN) tablet 2 mg  2 mg Oral Q6H PRN  Or  
 LORazepam (ATIVAN) injection 2 mg  2 mg IntraMUSCular Q6H PRN  
 diphenhydrAMINE (BENADRYL) capsule 50 mg  50 mg Oral Q6H PRN Or  
 diphenhydrAMINE (BENADRYL) injection 50 mg  50 mg IntraMUSCular Q6H PRN  
 haloperidol (HALDOL) tablet 5 mg  5 mg Oral Q6H PRN Or  
 haloperidol lactate (HALDOL) injection 5 mg  5 mg IntraMUSCular Q6H PRN  
 insulin lispro (HUMALOG) injection   SubCUTAneous AC&HS  
 glucose chewable tablet 16 g  4 Tab Oral PRN  
 dextrose (D50W) injection syrg 12.5-25 g  12.5-25 g IntraVENous PRN  
 glucagon (GLUCAGEN) injection 1 mg  1 mg IntraMUSCular PRN  
  
SCHEDULED MEDICATIONS:  
Current Facility-Administered Medications Medication Dose Route Frequency  OLANZapine (ZyPREXA) tablet 10 mg  10 mg Oral QHS  lithium carbonate CR (ESKALITH CR) tablet 900 mg  900 mg Oral QHS  phenazopyridine (PYRIDIUM) tablet 200 mg  200 mg Oral BID  lidocaine (LIDODERM) 5 % patch 3 Patch  3 Patch TransDERmal Q24H  
 miconazole (SECURA) 2 % extra thick cream   Topical BID  metFORMIN (GLUCOPHAGE) tablet 500 mg  500 mg Oral DAILY  insulin lispro (HUMALOG) injection   SubCUTAneous AC&HS  
  
 
 
ASSESSMENT & PLAN  
 
DIAGNOSES REQUIRING ACTIVE TREATMENT AND MONITORING: (reviewed/updated 7/30/2018) Patient Active Hospital Problem List: 
 [facility] (7/26/2018) Assessment: kar alternating with depression Plan: mood stabilizer and antispychotic Continue Eskalith  
7/28- Start Olanzapine 2.5mg at night 7/29- increase Olanzapine to 5mg at night, continue lithium I will continue to monitor blood levels (Depakote, Tegretol, lithium, clozapine---a drug with a narrow therapeutic index= NTI) and associated labs for drug therapy implemented that require intense monitoring for toxicity as deemed appropriate based on current medication side effects and pharmacodynamically determined drug 1/2 lives.  
 
In summary, Simi Turner, is a 68 y.o.  female who presents with a severe exacerbation of the principal diagnosis of Bipolar disorder ([facility] [address] Patient's condition is worsening/not improving/not stable . Patient requires continued inpatient hospitalization for further stabilization, safety monitoring and medication management. I will continue to coordinate the provision of individual, milieu, occupational, group, and substance abuse therapies to address target symptoms/diagnoses as deemed appropriate for the individual patient. A coordinated, multidisplinary treatment team round was conducted with the patient (this team consists of the nurse, psychiatric unit pharmcist,  and writer). Complete current electronic health record for patient has been reviewed today including consultant notes, ancillary staff notes, nurses and psychiatric tech notes. Suicide risk assessment completed and patient deemed to be of low risk for suicide at this time. The following regarding medications was addressed during rounds with patient:  
the risks and benefits of the proposed medication. The patient was given the opportunity to ask questions. Informed consent given to the use of the above medications. Will continue to adjust psychiatric and non-psychiatric medications (see above \"medication\" section and orders section for details) as deemed appropriate & based upon diagnoses and response to treatment. I will continue to order blood tests/labs and diagnostic tests as deemed appropriate and review results as they become available (see orders for details and above listed lab/test results). I will order psychiatric records from previous [facility] hospitals to further elucidate the nature of patient's psychopathology and review once available. I will gather additional collateral information from friends, family and o/p treatment team to further elucidate the nature of patient's psychopathology and baselline level of psychiatric functioning.  
  
 
 
I certify that this patient's inpatient psychiatric hospital services furnished since the previous certification were, and continue to be, required for treatment that could reasonably be expected to improve the patient's condition, or for diagnostic study, and that the patient continues to need, on a daily basis, active treatment furnished directly by or requiring the supervision of inpatient psychiatric facility personnel. In addition, the hospital records show that services furnished were intensive treatment services, admission or related services, or equivalent services. EXPECTED DISCHARGE DATE/DAY: TBD  
 
DISPOSITION: Home Signed By:  
Scott Tristan MD 
7/30/2018

## 2018-07-30 NOTE — PROGRESS NOTES
Problem: Falls - Risk of 
Goal: *Absence of Falls Document Hannah Fisher Fall Risk and appropriate interventions in the flowsheet. Outcome: Progressing Towards Goal 
Fall Risk Interventions: 
Mobility Interventions: Bed/chair exit alarm, Communicate number of staff needed for ambulation/transfer, Utilize walker, cane, or other assistive device Mentation Interventions: Adequate sleep, hydration, pain control, Bed/chair exit alarm, Door open when patient unattended, Increase mobility, More frequent rounding, Reorient patient Medication Interventions: Bed/chair exit alarm, Teach patient to arise slowly Elimination Interventions: Bed/chair exit alarm, Patient to call for help with toileting needs

## 2018-07-31 ENCOUNTER — APPOINTMENT (OUTPATIENT)
Dept: CT IMAGING | Age: 73
DRG: 885 | End: 2018-07-31
Attending: FAMILY MEDICINE
Payer: MEDICARE

## 2018-07-31 LAB
ANION GAP SERPL CALC-SCNC: 10 MMOL/L (ref 5–15)
BUN SERPL-MCNC: 12 MG/DL (ref 6–20)
BUN/CREAT SERPL: 21 (ref 12–20)
CALCIUM SERPL-MCNC: 9.1 MG/DL (ref 8.5–10.1)
CHLORIDE SERPL-SCNC: 107 MMOL/L (ref 97–108)
CO2 SERPL-SCNC: 24 MMOL/L (ref 21–32)
CREAT SERPL-MCNC: 0.58 MG/DL (ref 0.55–1.02)
ERYTHROCYTE [DISTWIDTH] IN BLOOD BY AUTOMATED COUNT: 11.8 % (ref 11.5–14.5)
GLUCOSE BLD STRIP.AUTO-MCNC: 112 MG/DL (ref 65–100)
GLUCOSE BLD STRIP.AUTO-MCNC: 114 MG/DL (ref 65–100)
GLUCOSE BLD STRIP.AUTO-MCNC: 133 MG/DL (ref 65–100)
GLUCOSE BLD STRIP.AUTO-MCNC: 138 MG/DL (ref 65–100)
GLUCOSE SERPL-MCNC: 101 MG/DL (ref 65–100)
HCT VFR BLD AUTO: 40.3 % (ref 35–47)
HGB BLD-MCNC: 13.5 G/DL (ref 11.5–16)
LACTATE SERPL-SCNC: 0.8 MMOL/L (ref 0.4–2)
MCH RBC QN AUTO: 30.9 PG (ref 26–34)
MCHC RBC AUTO-ENTMCNC: 33.5 G/DL (ref 30–36.5)
MCV RBC AUTO: 92.2 FL (ref 80–99)
NRBC # BLD: 0 K/UL (ref 0–0.01)
NRBC BLD-RTO: 0 PER 100 WBC
PLATELET # BLD AUTO: 246 K/UL (ref 150–400)
PMV BLD AUTO: 10.2 FL (ref 8.9–12.9)
POTASSIUM SERPL-SCNC: 3.8 MMOL/L (ref 3.5–5.1)
RBC # BLD AUTO: 4.37 M/UL (ref 3.8–5.2)
SERVICE CMNT-IMP: ABNORMAL
SODIUM SERPL-SCNC: 141 MMOL/L (ref 136–145)
WBC # BLD AUTO: 10.8 K/UL (ref 3.6–11)

## 2018-07-31 PROCEDURE — 97110 THERAPEUTIC EXERCISES: CPT

## 2018-07-31 PROCEDURE — 74011250637 HC RX REV CODE- 250/637: Performed by: PSYCHIATRY & NEUROLOGY

## 2018-07-31 PROCEDURE — 80048 BASIC METABOLIC PNL TOTAL CA: CPT | Performed by: FAMILY MEDICINE

## 2018-07-31 PROCEDURE — 36415 COLL VENOUS BLD VENIPUNCTURE: CPT | Performed by: FAMILY MEDICINE

## 2018-07-31 PROCEDURE — 74176 CT ABD & PELVIS W/O CONTRAST: CPT

## 2018-07-31 PROCEDURE — 83605 ASSAY OF LACTIC ACID: CPT | Performed by: FAMILY MEDICINE

## 2018-07-31 PROCEDURE — 74011250636 HC RX REV CODE- 250/636: Performed by: HOSPITALIST

## 2018-07-31 PROCEDURE — 97116 GAIT TRAINING THERAPY: CPT

## 2018-07-31 PROCEDURE — 65220000003 HC RM SEMIPRIVATE PSYCH

## 2018-07-31 PROCEDURE — 82962 GLUCOSE BLOOD TEST: CPT

## 2018-07-31 PROCEDURE — 74011000250 HC RX REV CODE- 250: Performed by: HOSPITALIST

## 2018-07-31 PROCEDURE — 85027 COMPLETE CBC AUTOMATED: CPT | Performed by: FAMILY MEDICINE

## 2018-07-31 RX ORDER — LITHIUM CARBONATE 450 MG/1
900 TABLET ORAL
Qty: 60 TAB | Refills: 0 | Status: SHIPPED | OUTPATIENT
Start: 2018-07-31 | End: 2018-10-17

## 2018-07-31 RX ORDER — OLANZAPINE 10 MG/1
10 TABLET ORAL
Qty: 30 TAB | Refills: 0 | Status: SHIPPED | OUTPATIENT
Start: 2018-07-31 | End: 2018-10-17

## 2018-07-31 RX ORDER — LIDOCAINE 50 MG/G
PATCH TOPICAL
Qty: 90 EACH | Refills: 0 | Status: SHIPPED | OUTPATIENT
Start: 2018-08-01 | End: 2018-10-17

## 2018-07-31 RX ORDER — METFORMIN HYDROCHLORIDE 500 MG/1
500 TABLET ORAL 2 TIMES DAILY WITH MEALS
Status: DISCONTINUED | OUTPATIENT
Start: 2018-07-31 | End: 2018-08-01 | Stop reason: HOSPADM

## 2018-07-31 RX ORDER — ATORVASTATIN CALCIUM 40 MG/1
40 TABLET, FILM COATED ORAL DAILY
Qty: 30 TAB | Refills: 0 | Status: SHIPPED | OUTPATIENT
Start: 2018-08-01 | End: 2018-10-17

## 2018-07-31 RX ORDER — ATORVASTATIN CALCIUM 40 MG/1
40 TABLET, FILM COATED ORAL DAILY
Status: DISCONTINUED | OUTPATIENT
Start: 2018-07-31 | End: 2018-08-01 | Stop reason: HOSPADM

## 2018-07-31 RX ORDER — METFORMIN HYDROCHLORIDE 500 MG/1
500 TABLET ORAL 2 TIMES DAILY WITH MEALS
Qty: 60 TAB | Refills: 0 | Status: SHIPPED | OUTPATIENT
Start: 2018-07-31 | End: 2018-10-17

## 2018-07-31 RX ORDER — PHENAZOPYRIDINE HYDROCHLORIDE 200 MG/1
200 TABLET, FILM COATED ORAL 2 TIMES DAILY
Qty: 6 TAB | Refills: 0 | Status: SHIPPED | OUTPATIENT
Start: 2018-07-31 | End: 2018-08-03

## 2018-07-31 RX ADMIN — LIDOCAINE HYDROCHLORIDE 1 G: 10 INJECTION, SOLUTION EPIDURAL; INFILTRATION; INTRACAUDAL; PERINEURAL at 22:00

## 2018-07-31 RX ADMIN — OLANZAPINE 10 MG: 5 TABLET, FILM COATED ORAL at 21:00

## 2018-07-31 RX ADMIN — ACETAMINOPHEN 650 MG: 325 TABLET, FILM COATED ORAL at 22:40

## 2018-07-31 RX ADMIN — MICONAZOLE NITRATE: 20 CREAM TOPICAL at 11:55

## 2018-07-31 RX ADMIN — ACETAMINOPHEN 650 MG: 325 TABLET, FILM COATED ORAL at 09:52

## 2018-07-31 RX ADMIN — LITHIUM CARBONATE 900 MG: 450 TABLET, EXTENDED RELEASE ORAL at 21:00

## 2018-07-31 RX ADMIN — ATORVASTATIN CALCIUM 40 MG: 40 TABLET, FILM COATED ORAL at 11:58

## 2018-07-31 RX ADMIN — PHENAZOPYRIDINE HYDROCHLORIDE 200 MG: 100 TABLET ORAL at 09:25

## 2018-07-31 RX ADMIN — PHENAZOPYRIDINE HYDROCHLORIDE 200 MG: 100 TABLET ORAL at 20:41

## 2018-07-31 RX ADMIN — MICONAZOLE NITRATE: 20 CREAM TOPICAL at 20:46

## 2018-07-31 NOTE — DISCHARGE INSTRUCTIONS
DISCHARGE SUMMARY    NAME:Enedelia Chandler  : 1945  MRN: [id number]    The patient Luciana Soto exhibits the ability to control behavior in a less restrictive environment. Patient's level of functioning is improving. No assaultive/destructive behavior has been observed for the past 24 hours. No suicidal/homicidal threat or behavior has been observed for the past 24 hours. There is no evidence of serious medication side effects. Patient has not been in physical or protective restraints for at least the past 24 hours. If weapons involved, how are they secured? No weapons involved. Is patient aware of and in agreement with discharge plan? Yes    Arrangements for medication:  Prescriptions given to patient. Referral for substance abuse treatment? Patient is not using using substances/Not applicable. Referral for smoking cessation needed? Patient is not a smoker/Not applicable. Copy of discharge instructions to provider?:  Riley Mcwilliams; Dr. Fadi Elaine for transportation home:  Son to . Keep all follow up appointments as scheduled, continue to take prescribed medications per physician instructions. Mental health crisis number:  568 or your local mental health crisis line number at [phone]. DISCHARGE SUMMARY from Nurse    PATIENT INSTRUCTIONS:    What to do at Home:  Recommended activity: Activity as tolerated,     If you experience any of the following symptoms thoughts of harming self, racing thoughts and that are rapidly changing topic, feeling overwhelmed with anxiety, hopelessness , please follow up with your assigned providers and local crisis number. *  Please give a list of your current medications to your Primary Care Provider. *  Please update this list whenever your medications are discontinued, doses are      changed, or new medications (including over-the-counter products) are added.     *  Please carry medication information at all times in case of emergency situations. These are general instructions for a healthy lifestyle:    No smoking/ No tobacco products/ Avoid exposure to second hand smoke  Surgeon General's Warning:  Quitting smoking now greatly reduces serious risk to your health. Obesity, smoking, and sedentary lifestyle greatly increases your risk for illness    A healthy diet, regular physical exercise & weight monitoring are important for maintaining a healthy lifestyle    You may be retaining fluid if you have a history of heart failure or if you experience any of the following symptoms:  Weight gain of 3 pounds or more overnight or 5 pounds in a week, increased swelling in our hands or feet or shortness of breath while lying flat in bed. Please call your doctor as soon as you notice any of these symptoms; do not wait until your next office visit. Recognize signs and symptoms of STROKE:    F-face looks uneven    A-arms unable to move or move unevenly    S-speech slurred or non-existent    T-time-call 911 as soon as signs and symptoms begin-DO NOT go       Back to bed or wait to see if you get better-TIME IS BRAIN. Warning Signs of HEART ATTACK     Call 911 if you have these symptoms:   Chest discomfort. Most heart attacks involve discomfort in the center of the chest that lasts more than a few minutes, or that goes away and comes back. It can feel like uncomfortable pressure, squeezing, fullness, or pain.  Discomfort in other areas of the upper body. Symptoms can include pain or discomfort in one or both arms, the back, neck, jaw, or stomach.  Shortness of breath with or without chest discomfort.  Other signs may include breaking out in a cold sweat, nausea, or lightheadedness. Don't wait more than five minutes to call 911 - MINUTES MATTER! Fast action can save your life. Calling 911 is almost always the fastest way to get lifesaving treatment.  Emergency Medical Services staff can begin treatment when they arrive -- up to an hour sooner than if someone gets to the hospital by car. The discharge information has been reviewed with the patient. The patient verbalized understanding. Discharge medications reviewed with the patient and appropriate educational materials and side effects teaching were provided.   ___________________________________________________________________________________________________________________________________

## 2018-07-31 NOTE — INTERDISCIPLINARY ROUNDS
Behavioral Health Interdisciplinary Rounds Patient Name: Christie Swartz  Age: 68 y.o. Room/Bed:  725/02 [facility]: [facility] Admission Status: Voluntary Readmission within 30 days: yes Power of  in place: no 
Patient requires a blocked bed: no          Reason for blocked bed: VTE Prophylaxis: Not indicated Mobility needs/Fall risk: yes Nutritional Plan: no 
Consults:         
Labs/Testing due today?: yes Sleep hours:  5.25 Participation in Care/Groups:  no 
Medication Compliant?: no 
PRNS (last 24 hours): pain, sleep Restraints (last 24 hours):  no 
  
CIWA (range last 24 hours): COWS (range last 24 hours): Alcohol screening (AUDIT) completed -   AUDIT Score: 0 If applicable, date SBIRT discussed in treatment team AND documented:  
 
Tobacco - patient is a smoker: Have You Used Tobacco in the Past 30 Days: No 
Illegal Drugs use: Have You Used Any Illegal Substances Over the Past 12 Months: No 
 
24 hour chart check complete: yes Patient goal(s) for today: Prepare for discharge tomorrow Treatment team focus/goals: Schedule follow-up; prepare for discharge tomorrow Progress note: Patient's speech slowed down and more goal-directed; states she wants to follow-up with a psychiatrist 
 
LOS:  5  Expected LOS: 6 Financial concerns/prescription coverage:  Medicare Date of last family contact:      
Family requesting physician contact today:  No 
Discharge plan: Return home Guns in the home: No     
Outpatient provider(s): [facility] Participating treatment team members: Christie Swartz, JE Flroez; Dr. Ibeth Vallejo MD; David Nieves RN; Rafa Patel, LizaD

## 2018-07-31 NOTE — PROGRESS NOTES
Problem: Falls - Risk of 
Goal: *Absence of Falls Document Zaire Contreras Fall Risk and appropriate interventions in the flowsheet. Outcome: Progressing Towards Goal 
Fall Risk Interventions: 
Mobility Interventions: Bed/chair exit alarm, Communicate number of staff needed for ambulation/transfer, Utilize walker, cane, or other assistive device Mentation Interventions: Adequate sleep, hydration, pain control, Bed/chair exit alarm, Toileting rounds, More frequent rounding Medication Interventions: Bed/chair exit alarm, Teach patient to arise slowly Elimination Interventions: Bed/chair exit alarm, Patient to call for help with toileting needs

## 2018-07-31 NOTE — BH NOTES
Hospitalist up to see pt about her concern for possible UTI. He evaluated pt and has ordered lactic acid. Pt prefers not to be stuck at this late hour while her and room-mate are sleeping.  He changed order for lab to be drawn in am.

## 2018-07-31 NOTE — DISCHARGE SUMMARY
PSYCHIATRIC DISCHARGE SUMMARY         IDENTIFICATION:    Patient Name  Caleb Guerra   Date of Birth 1945   [facility] [id number]   Medical Record Number  [id number]      Age  68 y.o. PCP None   Admit date:  7/25/2018    Discharge date: 7/31/2018   Room Number  725/02  @ [facility]   Date of Service  7/31/2018               TYPE OF DISCHARGE: REGULAR               CONDITION AT DISCHARGE: good       PROVISIONAL & DISCHARGE DIAGNOSES:    Problem List  Never Reviewed          Codes Class    Psychosis ICD-10-CM: F29  ICD-9-CM: 298.9         * (Principal)Bipolar disorder ([facility] [address] ICD-10-CM: F31.9  ICD-9-CM: 296.80               Active Hospital Problems    *Bipolar disorder ([facility] [address]        DISCHARGE DIAGNOSIS:   Axis I:  SEE ABOVE  Axis II: SEE ABOVE  Axis III: SEE ABOVE  Axis IV:  lack of structure  Axis V:  40 on admission, 70 on discharge 70(baseline)       CC & HISTORY OF PRESENT ILLNESS:  68year old  female admitted for kar. Pt was stabilized on lithium and zyprexa. At discharge she had goal directed thinking and neutral mood. She denied SI/HI intent and plan and did not meet TDO criteria. SOCIAL HISTORY:    Social History     Social History    Marital status:      Spouse name: N/A    Number of children: N/A    Years of education: N/A     Occupational History    Not on file. Social History Main Topics    Smoking status: Never Smoker    Smokeless tobacco: Never Used    Alcohol use No    Drug use: Not on file    Sexual activity: Not on file     Other Topics Concern    Not on file     Social History Narrative    68year old  female admitted voluntarily for psyychotic behavior. Pt has paranoid delusions. She is widodef and lives alone. She was discharged from [facility] days ago. FAMILY HISTORY:   History reviewed. No pertinent family history.           HOSPITALIZATION COURSE:    Caleb Guerra was admitted to the inpatient psychiatric unit [facility] for acute psychiatric stabilization in regards to symptomatology as described in the HPI above. The differential diagnosis at time of admission included: bipolar dis vs. schizoaffective vs. Schizophrenia. While on the unit Al Mcmahon was involved in individual, group, occupational and milieu therapy. Psychiatric medications were adjusted during this hospitalization including lithium . Al Mcmahon demonstrated a slow, but progressive improvement in overall condition. Much of patient's depression appeared to be related to situational stressors and psychological factors. Please see individual progress notes for more specific details regarding patient's hospitalization course. At time of dc, Al Mcmahon was without significant problems with depression psychosis  kar. Overall presentation at time of discharge is most consistent with the diagnosis of bipolar disorder Patient with request for discharge today. There are no grounds to seek a TDO. Patient has maximized benefit to be derived from acute inpatient psychiatric treatment.   All members of the treatment team concur with each other in regards to plans for discharge today per patient's request.          LABS AND IMAGAING:    Labs Reviewed   METABOLIC PANEL, COMPREHENSIVE - Abnormal; Notable for the following:        Result Value    Potassium 3.0 (*)     Glucose 196 (*)     BUN/Creatinine ratio 23 (*)     All other components within normal limits   ACETAMINOPHEN - Abnormal; Notable for the following:     Acetaminophen level <2 (*)     All other components within normal limits   SALICYLATE - Abnormal; Notable for the following:     Salicylate level <4.8 (*)     All other components within normal limits   URINALYSIS W/MICROSCOPIC - Abnormal; Notable for the following:     Blood MODERATE (*)     All other components within normal limits   GLUCOSE, FASTING - Abnormal; Notable for the following:     Glucose 114 (*)     All other components within normal limits   LIPID PANEL - Abnormal; Notable for the following:     Triglyceride 154 (*)     LDL, calculated 119.2 (*)     CHOL/HDL Ratio 5.2 (*)     All other components within normal limits   URINALYSIS W/MICROSCOPIC - Abnormal; Notable for the following:     Epithelial cells MODERATE (*)     Bacteria 1+ (*)     All other components within normal limits   METABOLIC PANEL, BASIC - Abnormal; Notable for the following:     Glucose 101 (*)     BUN/Creatinine ratio 21 (*)     All other components within normal limits   GLUCOSE, POC - Abnormal; Notable for the following:     Glucose (POC) 167 (*)     All other components within normal limits   GLUCOSE, POC - Abnormal; Notable for the following:     Glucose (POC) 167 (*)     All other components within normal limits   GLUCOSE, POC - Abnormal; Notable for the following:     Glucose (POC) 106 (*)     All other components within normal limits   GLUCOSE, POC - Abnormal; Notable for the following:     Glucose (POC) 124 (*)     All other components within normal limits   GLUCOSE, POC - Abnormal; Notable for the following:     Glucose (POC) 112 (*)     All other components within normal limits   GLUCOSE, POC - Abnormal; Notable for the following:     Glucose (POC) 111 (*)     All other components within normal limits   GLUCOSE, POC - Abnormal; Notable for the following:     Glucose (POC) 139 (*)     All other components within normal limits   GLUCOSE, POC - Abnormal; Notable for the following:     Glucose (POC) 121 (*)     All other components within normal limits   GLUCOSE, POC - Abnormal; Notable for the following:     Glucose (POC) 125 (*)     All other components within normal limits   GLUCOSE, POC - Abnormal; Notable for the following:     Glucose (POC) 103 (*)     All other components within normal limits   GLUCOSE, POC - Abnormal; Notable for the following:     Glucose (POC) 148 (*)     All other components within normal limits   GLUCOSE, POC - Abnormal; Notable for the following:     Glucose (POC) 104 (*)     All other components within normal limits   GLUCOSE, POC - Abnormal; Notable for the following:     Glucose (POC) 128 (*)     All other components within normal limits   GLUCOSE, POC - Abnormal; Notable for the following:     Glucose (POC) 113 (*)     All other components within normal limits   GLUCOSE, POC - Abnormal; Notable for the following:     Glucose (POC) 134 (*)     All other components within normal limits   GLUCOSE, POC - Abnormal; Notable for the following:     Glucose (POC) 117 (*)     All other components within normal limits   GLUCOSE, POC - Abnormal; Notable for the following:     Glucose (POC) 114 (*)     All other components within normal limits   GLUCOSE, POC - Abnormal; Notable for the following:     Glucose (POC) 112 (*)     All other components within normal limits   URINE CULTURE HOLD SAMPLE   URINE CULTURE HOLD SAMPLE   CBC WITH AUTOMATED DIFF   SAMPLES BEING HELD   DRUG SCREEN, URINE   ETHYL ALCOHOL   TSH 3RD GENERATION   HEMOGLOBIN A1C WITH EAG   LACTIC ACID   CBC W/O DIFF   GLUCOSE, POC   GLUCOSE, POC     Admission on 07/25/2018   Component Date Value Ref Range Status    WBC 07/25/2018 5.7  3.6 - 11.0 K/uL Final    RBC 07/25/2018 4.10  3.80 - 5.20 M/uL Final    HGB 07/25/2018 12.7  11.5 - 16.0 g/dL Final    HCT 07/25/2018 37.3  35.0 - 47.0 % Final    MCV 07/25/2018 91.0  80.0 - 99.0 FL Final    MCH 07/25/2018 31.0  26.0 - 34.0 PG Final    MCHC 07/25/2018 34.0  30.0 - 36.5 g/dL Final    RDW 07/25/2018 11.7  11.5 - 14.5 % Final    PLATELET 26/71/2704 814  150 - 400 K/uL Final    MPV 07/25/2018 9.8  8.9 - 12.9 FL Final    NRBC 07/25/2018 0.0  0  WBC Final    ABSOLUTE NRBC 07/25/2018 0.00  0.00 - 0.01 K/uL Final    NEUTROPHILS 07/25/2018 57  32 - 75 % Final    LYMPHOCYTES 07/25/2018 33  12 - 49 % Final    MONOCYTES 07/25/2018 8  5 - 13 % Final    EOSINOPHILS 07/25/2018 1  0 - 7 % Final    BASOPHILS 07/25/2018 1  0 - 1 % Final  IMMATURE GRANULOCYTES 07/25/2018 0  0.0 - 0.5 % Final    ABS. NEUTROPHILS 07/25/2018 3.2  1.8 - 8.0 K/UL Final    ABS. LYMPHOCYTES 07/25/2018 1.9  0.8 - 3.5 K/UL Final    ABS. MONOCYTES 07/25/2018 0.5  0.0 - 1.0 K/UL Final    ABS. EOSINOPHILS 07/25/2018 0.1  0.0 - 0.4 K/UL Final    ABS. BASOPHILS 07/25/2018 0.0  0.0 - 0.1 K/UL Final    ABS. IMM. GRANS. 07/25/2018 0.0  0.00 - 0.04 K/UL Final    DF 07/25/2018 AUTOMATED    Final    Sodium 07/25/2018 142  136 - 145 mmol/L Final    Potassium 07/25/2018 3.0* 3.5 - 5.1 mmol/L Final    Chloride 07/25/2018 104  97 - 108 mmol/L Final    CO2 07/25/2018 28  21 - 32 mmol/L Final    Anion gap 07/25/2018 10  5 - 15 mmol/L Final    Glucose 07/25/2018 196* 65 - 100 mg/dL Final    BUN 07/25/2018 14  6 - 20 MG/DL Final    Creatinine 07/25/2018 0.61  0.55 - 1.02 MG/DL Final    BUN/Creatinine ratio 07/25/2018 23* 12 - 20   Final    GFR est AA 07/25/2018 >60  >60 ml/min/1.73m2 Final    GFR est non-AA 07/25/2018 >60  >60 ml/min/1.73m2 Final    Calcium 07/25/2018 9.0  8.5 - 10.1 MG/DL Final    Bilirubin, total 07/25/2018 0.5  0.2 - 1.0 MG/DL Final    ALT (SGPT) 07/25/2018 33  12 - 78 U/L Final    AST (SGOT) 07/25/2018 20  15 - 37 U/L Final    Alk.  phosphatase 07/25/2018 57  45 - 117 U/L Final    Protein, total 07/25/2018 7.0  6.4 - 8.2 g/dL Final    Albumin 07/25/2018 3.6  3.5 - 5.0 g/dL Final    Globulin 07/25/2018 3.4  2.0 - 4.0 g/dL Final    A-G Ratio 07/25/2018 1.1  1.1 - 2.2   Final    Acetaminophen level 07/25/2018 <2* 10 - 30 ug/mL Final    Salicylate level 69/17/0400 <1.7* 2.8 - 20.0 MG/DL Final    AMPHETAMINES 07/26/2018 NEGATIVE   NEG   Final    BARBITURATES 07/26/2018 NEGATIVE   NEG   Final    BENZODIAZEPINES 07/26/2018 NEGATIVE   NEG   Final    COCAINE 07/26/2018 NEGATIVE   NEG   Final    METHADONE 07/26/2018 NEGATIVE   NEG   Final    OPIATES 07/26/2018 NEGATIVE   NEG   Final    PCP(PHENCYCLIDINE) 07/26/2018 NEGATIVE   NEG   Final    THC (TH-CANNABINOL) 07/26/2018 NEGATIVE   NEG   Final    Drug screen comment 07/26/2018 (NOTE)   Final    Color 07/26/2018 YELLOW    Final    Appearance 07/26/2018 CLEAR  CLEAR   Final    Specific gravity 07/26/2018 1.007  1.003 - 1.030   Final    pH (UA) 07/26/2018 6.5  5.0 - 8.0   Final    Protein 07/26/2018 NEGATIVE   NEG mg/dL Final    Glucose 07/26/2018 NEGATIVE   NEG mg/dL Final    Ketone 07/26/2018 NEGATIVE   NEG mg/dL Final    Bilirubin 07/26/2018 NEGATIVE   NEG   Final    Blood 07/26/2018 MODERATE* NEG   Final    Urobilinogen 07/26/2018 0.2  0.2 - 1.0 EU/dL Final    Nitrites 07/26/2018 NEGATIVE   NEG   Final    Leukocyte Esterase 07/26/2018 NEGATIVE   NEG   Final    WBC 07/26/2018 0-4  0 - 4 /hpf Final    RBC 07/26/2018 0-5  0 - 5 /hpf Final    Epithelial cells 07/26/2018 FEW  FEW /lpf Final    Bacteria 07/26/2018 NEGATIVE   NEG /hpf Final    Urine culture hold 07/26/2018 URINE ON HOLD IN MICROBIOLOGY DEPT FOR 3 DAYS. IF UNPRESERVED URINE IS SUBMITTED, IT CANNOT BE USED FOR ADDITIONAL TESTING AFTER 24 HRS, RECOLLECTION WILL BE REQUIRED.     Final    ALCOHOL(ETHYL),SERUM 07/25/2018 <10  <10 MG/DL Final    Ventricular Rate 07/26/2018 84  BPM Final    Atrial Rate 07/26/2018 84  BPM Final    P-R Interval 07/26/2018 128  ms Final    QRS Duration 07/26/2018 82  ms Final    Q-T Interval 07/26/2018 422  ms Final    QTC Calculation (Bezet) 07/26/2018 498  ms Final    Calculated P Axis 07/26/2018 76  degrees Final    Calculated R Axis 07/26/2018 25  degrees Final    Calculated T Axis 07/26/2018 27  degrees Final    Diagnosis 07/26/2018    Final                    Value:Normal sinus rhythm  Nonspecific ST and T wave abnormality  Prolonged QT  No previous ECGs available  Confirmed by Faith Richards MD, Shaunna Montero ([id number]) on 7/26/2018 8:51:32 AM      TSH 07/25/2018 1.04  0.36 - 3.74 uIU/mL Final    Hemoglobin A1c 07/26/2018 6.3  4.2 - 6.3 % Final    Est. average glucose 07/26/2018 134  mg/dL Final    Glucose (POC) 07/26/2018 167* 65 - 100 mg/dL Final    Performed by 07/26/2018 NEIL BLACKWOOD   Final    Glucose (POC) 07/26/2018 167* 65 - 100 mg/dL Final    Performed by 07/26/2018 YOEL ORTIZ   Final    Glucose 07/27/2018 114* 65 - 100 MG/DL Final    LIPID PROFILE 07/27/2018        Final    Cholesterol, total 07/27/2018 186  <200 MG/DL Final    Triglyceride 07/27/2018 154* <150 MG/DL Final    HDL Cholesterol 07/27/2018 36  MG/DL Final    LDL, calculated 07/27/2018 119.2* 0 - 100 MG/DL Final    VLDL, calculated 07/27/2018 30.8  MG/DL Final    CHOL/HDL Ratio 07/27/2018 5.2* 0 - 5.0   Final    Glucose (POC) 07/27/2018 106* 65 - 100 mg/dL Final    Performed by 07/27/2018 Rex Enrique   Final    Glucose (POC) 07/27/2018 96  65 - 100 mg/dL Final    Performed by 07/27/2018 Rex Enrique   Final    Glucose (POC) 07/27/2018 124* 65 - 100 mg/dL Final    Performed by 07/27/2018 LUJAN (Moon) WONG   Final    Glucose (POC) 07/27/2018 112* 65 - 100 mg/dL Final    Performed by 07/27/2018 LUJAN (Moon) WONG   Final    Glucose (POC) 07/28/2018 111* 65 - 100 mg/dL Final    Performed by 07/28/2018 Noe Bingham   Final    Glucose (POC) 07/28/2018 139* 65 - 100 mg/dL Final    Performed by 07/28/2018 Rex Enrique   Final    Glucose (POC) 07/28/2018 121* 65 - 100 mg/dL Final    Performed by 07/28/2018 LUJAN (Moon) WONG   Final    Glucose (POC) 07/28/2018 125* 65 - 100 mg/dL Final    Performed by 07/28/2018 Billy Steele   Final    Glucose (POC) 07/29/2018 103* 65 - 100 mg/dL Final    Performed by 07/29/2018 Rex Enrique   Final    Glucose (POC) 07/29/2018 148* 65 - 100 mg/dL Final    Performed by 07/29/2018 Rex Enrique   Final    Glucose (POC) 07/29/2018 104* 65 - 100 mg/dL Final    Performed by 07/29/2018 Jennifer JAY   Final    Glucose (POC) 07/29/2018 128* 65 - 100 mg/dL Final    Performed by 07/29/2018 JOHNNIE FAIRCHILD   Final    Glucose (POC) 07/30/2018 99  65 - 100 mg/dL Final    Performed by 07/30/2018 [facility] Nguyen   Final    Glucose (POC) 07/30/2018 113* 65 - 100 mg/dL Final    Performed by 07/30/2018 Damon Robles   Final    Color 07/30/2018 YELLOW/STRAW    Final    Appearance 07/30/2018 CLEAR  CLEAR   Final    Specific gravity 07/30/2018 1.014  1.003 - 1.030   Final    pH (UA) 07/30/2018 5.5  5.0 - 8.0   Final    Protein 07/30/2018 NEGATIVE   NEG mg/dL Final    Glucose 07/30/2018 NEGATIVE   NEG mg/dL Final    Ketone 07/30/2018 NEGATIVE   NEG mg/dL Final    Bilirubin 07/30/2018 NEGATIVE   NEG   Final    Blood 07/30/2018 NEGATIVE   NEG   Final    Urobilinogen 07/30/2018 1.0  0.2 - 1.0 EU/dL Final    Nitrites 07/30/2018 NEGATIVE   NEG   Final    Leukocyte Esterase 07/30/2018 NEGATIVE   NEG   Final    WBC 07/30/2018 5-10  0 - 4 /hpf Final    RBC 07/30/2018 0-5  0 - 5 /hpf Final    Epithelial cells 07/30/2018 MODERATE* FEW /lpf Final    Bacteria 07/30/2018 1+* NEG /hpf Final    Glucose (POC) 07/30/2018 134* 65 - 100 mg/dL Final    Performed by 07/30/2018 YOEL ORTIZ   Final    Urine culture hold 07/30/2018 URINE ON HOLD IN MICROBIOLOGY DEPT FOR 3 DAYS. IF UNPRESERVED URINE IS SUBMITTED, IT CANNOT BE USED FOR ADDITIONAL TESTING AFTER 24 HRS, RECOLLECTION WILL BE REQUIRED.     Final    Glucose (POC) 07/30/2018 117* 65 - 100 mg/dL Final    Performed by 07/30/2018 NEIL BLACKWOOD   Final    Lactic acid 07/31/2018 0.8  0.4 - 2.0 MMOL/L Final    WBC 07/31/2018 10.8  3.6 - 11.0 K/uL Final    RBC 07/31/2018 4.37  3.80 - 5.20 M/uL Final    HGB 07/31/2018 13.5  11.5 - 16.0 g/dL Final    HCT 07/31/2018 40.3  35.0 - 47.0 % Final    MCV 07/31/2018 92.2  80.0 - 99.0 FL Final    MCH 07/31/2018 30.9  26.0 - 34.0 PG Final    MCHC 07/31/2018 33.5  30.0 - 36.5 g/dL Final    RDW 07/31/2018 11.8  11.5 - 14.5 % Final    PLATELET 48/17/3246 877  150 - 400 K/uL Final    MPV 07/31/2018 10.2  8.9 - 12.9 FL Final    NRBC 07/31/2018 0.0  0  WBC Final    ABSOLUTE NRBC 07/31/2018 0.00  0.00 - 0.01 K/uL Final    Sodium 07/31/2018 141  136 - 145 mmol/L Final    Potassium 07/31/2018 3.8  3.5 - 5.1 mmol/L Final    Chloride 07/31/2018 107  97 - 108 mmol/L Final    CO2 07/31/2018 24  21 - 32 mmol/L Final    Anion gap 07/31/2018 10  5 - 15 mmol/L Final    Glucose 07/31/2018 101* 65 - 100 mg/dL Final    BUN 07/31/2018 12  6 - 20 MG/DL Final    Creatinine 07/31/2018 0.58  0.55 - 1.02 MG/DL Final    BUN/Creatinine ratio 07/31/2018 21* 12 - 20   Final    GFR est AA 07/31/2018 >60  >60 ml/min/1.73m2 Final    GFR est non-AA 07/31/2018 >60  >60 ml/min/1.73m2 Final    Calcium 07/31/2018 9.1  8.5 - 10.1 MG/DL Final    Glucose (POC) 07/31/2018 114* 65 - 100 mg/dL Final    Performed by 07/31/2018 Sami Eder   Final    Glucose (POC) 07/31/2018 112* 65 - 100 mg/dL Final    Performed by 07/31/2018 PAUL Altamiranois Aleksandra   Final     Ct Abd Pelv Wo Cont    Result Date: 7/26/2018  EXAM:  CT ABDOMEN PELVIS WITHOUT CONTRAST INDICATION:  LLQ pain COMPARISON: None. . TECHNIQUE: Unenhanced multislice helical CT was performed from the diaphragm to the symphysis pubis without intravenous contrast administration. Contiguous 5 mm axial images were reconstructed and lung and soft tissue windows were generated. Coronal and sagittal reformations were generated. CT dose reduction was achieved through use of a standardized protocol tailored for this examination and automatic exposure control for dose modulation. Eli Maggie FINDINGS: INCIDENTALLY IMAGED CHEST: Heart/vessels: Within normal limits. Lungs/Pleura: Calcified nodule in the right lower lobe. . ABDOMEN: Liver: Calculus in the liver suggesting remote granulomatous disease. Gallbladder/Biliary: Status post cholecystectomy. Spleen: Calculus in the spleen suggesting remote granulomatous disease. Pancreas: Pancreatic atrophy.  There are a few calculi within lymph nodes at the jacquelyn hepatis Adrenals: Within normal limits. Kidneys: Punctate calculus in the interpolar region of the right kidney. Distended renal pelves, left greater than right Peritoneum/Mesenteries: Minimal perez appearance of the central mesentery. Extraperitoneum: Within normal limits. Gastrointestinal tract: Diverticulosis in the descending and sigmoid colon. Normal-appearing appendix Vascular: Calcifications in the aorta. Right gonadal vein phleboliths. Enedelia Po PELVIS: Extraperitoneum: The floor the pelvis suggesting phleboliths. Ureters: Within normal limits. Bladder: There is a locule of gas in the antidependent portion of the bladder. Reproductive System: Status post hysterectomy. . MSK: Extensive degenerative change throughout the visualized spine, nonfocal. Osteopenia. Tiny, fat-containing umbilical hernia . IMPRESSION: 1. There is a single locule of gas in the antidependent portion of the bladder. Recommend clinical correlation for recent instrumentation versus cystitis 2. There is diverticulosis in the descending and sigmoid colon; however, there is no evidence of diverticulitis. 3. Incidental findings as above. DISPOSITION:    Home. Patient to f/u with o/p psychiatric, and psychotherapy appointments. Patient is to f/u with internist as directed. Patient should have a lithium  level and associated labs checked within the next 1-2 weeks by patient's o/p psychiatrist/internist.               FOLLOW-UP CARE:    Activity as tolerated  Regular Diet  Wound Care: none needed. Follow-up Information     None                 PROGNOSIS:  Greatly dependent upon patient's ability to f/u with o/p psychiatric/psychotherapy appointments as well as to comply with psychiatric medications as prescribed. Patient denies suicidal or homicidal ideations. [facility] fully contracts for safety. Patient reports many positive predictive factors in terms of not attempting suicide or homicide. Patient appears to be at low risk of suicide or homicide.  Patient and family are aware and in agreement with discharge and discharge plan. DISCHARGE MEDICATIONS: (no changes made). Informed consent given for the use of following psychotropic medications:  Current Discharge Medication List      CONTINUE these medications which have NOT CHANGED    Details   cholecalciferol (VITAMIN D3) 1,000 unit tablet Take 1,000 Units by mouth daily. QUEtiapine (SEROQUEL) 100 mg tablet Take 100 mg by mouth nightly. Indications: Cinthya associated with Bipolar Disorder      metFORMIN (GLUCOPHAGE) 500 mg tablet Take  by mouth daily (with breakfast). Indications: type 2 diabetes mellitus      Omega-3 Fatty Acids (FISH OIL) 500 mg cap Take 1,000 mg by mouth. Indications: hypertriglyceridemia      lisinopril (PRINIVIL, ZESTRIL) 2.5 mg tablet Take 2.5 mg by mouth daily. Indications: hypertension                    A coordinated, multidisplinary treatment team round was conducted with Demetria Muir is done daily here at [facility] . This team consists of the nurse, psychiatric unit pharmcist,  and writer. I have spent greater than 35 minutes on discharge work.     Signed:  Charisma Schumacher MD  7/31/2018

## 2018-07-31 NOTE — BH NOTES
PRN Medication Documentation Specific patient behavior that led to need for PRN medication: patient complaining of pain 7/10 ing groin Staff interventions attempted prior to PRN being given: redirection PRN medication given: 650 mg acetaminophen PO Patient response/effectiveness of PRN medication: will continue to assess

## 2018-07-31 NOTE — BH NOTES
PRN Medication Documentation Specific patient behavior that led to need for PRN medication: tooth ache, 7/10 Staff interventions attempted prior to PRN being given: distraction PRN medication given: tylenol PO Patient response/effectiveness of PRN medication: will reassess

## 2018-07-31 NOTE — BH NOTES
GROUP THERAPY PROGRESS NOTE Stas Delcid participated in a morning Process Group on the General Unit with a focus identifying  
feelings, planning for the day, and learning more about DBT concepts on \"Emotion Regulation. \" . 
Group time: 65 minutes. Personal goal for participation: To increase the capacity to improve ones mood, set personal  
goals, and understand more about basic activities to help regulate emotions. Goal orientation: The patients will be able to identify their feelings and develop a plan for  
structuring their day. They were also presented with a summary sheet on emotional regulation,  
in regards to focusing on one goal per day, taking physical care of oneself, and recognizing  
and/or building positive experiences. The didactic portion of the session focused on these three 
concepts; 1) defining and focusing on one goal per day; 2) taking care of ones physical  
maintenance and basic needs  sleep, nutrition, and exercise; and 3) finding and building on  
positive experiences. Group therapy participation: After joining the group about twenty minutes after it started,  
this patient actively participated in the group. Therapeutic interventions reviewed and discussed: The group members were asked to  
identify an emotion they are having and/or let the group know what they want to focus on for the  
day as they continue to make discharge plans. The group members reviewed three DBT  
suggestions regarding emotional regulation, in regards to focusing on one goal per day, taking  
physical care of oneself, and recognizing and/or building positive experiences. It was suggested  
that these three concepts can be seen as headings for their list of coping skills. The group  
members were also provided worksheets on the topic discussed for their review and use on  
their own time.  
  
Impression of participation: The patient remained hyperverbal and needed frequent redirection. She seemed to be able to accept the redirection and to accept that she was hyperverbal when this  
was pointed out, although it did not stop her from interrupting. She was not loud or agitated. She was alert and generally oriented. She expressed no current SI/HI and displayed no overt  
psychotic symptoms in this group, aside from manic-like speech. Her affect was labile, 
sometimes anxious and sometimes sad; she cried when she spoke about the difficulties of her  
adult son. She also identified with her peers and admitted to having gone to [address] [place] [address], because both 
her   and her son had problems with alochol or drugs. Her mood reflected her 
affect, in addition to being hyperverbal and mildly euphoric. This was the patient's first process 
group on the General Unit.

## 2018-07-31 NOTE — PROGRESS NOTES
Problem: Mobility Impaired (Adult and Pediatric) Goal: *Acute Goals and Plan of Care (Insert Text) Physical Therapy Goals Initiated 7/30/2018 1. Patient will move from supine to sit and sit to supine  in bed with independence within 7 day(s). 2.  Patient will transfer from bed to chair and chair to bed with modified independence using the least restrictive device within 7 day(s). 3.  Patient will perform sit to stand with modified independence within 7 day(s). 4.  Patient will ambulate with modified independence for 200 feet with the least restrictive device within 7 day(s). physical Therapy TREATMENT Patient: Luciana Soto (68 y.o. female) Date: 7/31/2018 Diagnosis: Psychosis Bipolar disorder ([facility] [address] Precautions:   
Chart, physical therapy assessment, plan of care and goals were reviewed. ASSESSMENT: 
Pt received sitting up in dayroom and cleared by RN. Continuing to report LLE hip/groin pain. Ambulated 80ft with cane and slowed gait speed/minimally antalgic. Performed standing exercises with support of railing in hallway. Pt tolerated all well with the exception of mini squats(increasing knee pain), and standing hip abduction(pain on L). Ambulated back to chair and reviewed stretching exercises. Pt would benefit from OP PT at discharge if CT comes up negative and pain continues. Progression toward goals: 
[x]    Improving appropriately and progressing toward goals 
[]    Improving slowly and progressing toward goals 
[]    Not making progress toward goals and plan of care will be adjusted PLAN: 
Patient continues to benefit from skilled intervention to address the above impairments. Continue treatment per established plan of care. Discharge Recommendations:  Outpatient Further Equipment Recommendations for Discharge:  None SUBJECTIVE:  
Patient stated I gave my last cane away.  OBJECTIVE DATA SUMMARY:  
Critical Behavior: 
Neurologic State: Sleeping Orientation Level: Disoriented to situation, Disoriented to time Cognition: Decreased attention/concentration, Impaired decision making Functional Mobility Training: 
Bed Mobility: 
  
  
  
  
  
  
Transfers: 
Sit to Stand: Minimum assistance; Additional time;Assist x1 Stand to Sit: Contact guard assistance Balance: 
Sitting: Intact Standing: Impaired Standing - Static: Fair Standing - Dynamic : Fair Ambulation/Gait Training: 
Distance (ft): 80 Feet (ft) (80ft x 2) Assistive Device: Gait belt;Cane, straight Ambulation - Level of Assistance: Contact guard assistance Gait Abnormalities: Decreased step clearance; Antalgic Base of Support: Widened Speed/Hetal: Pace decreased (<100 feet/min); Shuffled Stairs: 
  
  
   
 
Neuro Re-Education: 
 
Therapeutic Exercises: Ankle pumps, Standing marches, hip abduction, mini squats Pain: 
Pain Scale 1: Numeric (0 - 10) Pain Intensity 1: 0 Pain Location 1: Groin Pain Orientation 1: Lateral;Left Pain Description 1: Aching Pain Intervention(s) 1: Medication (see MAR) Activity Tolerance:  
Good Please refer to the flowsheet for vital signs taken during this treatment. After treatment:  
[x]    Patient left in no apparent distress sitting up in chair 
[]    Patient left in no apparent distress in bed 
[]    Call bell left within reach [x]    Nursing notified 
[]    Caregiver present 
[]    Bed alarm activated COMMUNICATION/COLLABORATION:  
The patients plan of care was discussed with: Registered Nurse Concha Obrien PT Time Calculation: 30 mins

## 2018-07-31 NOTE — BH NOTES
GROUP THERAPY PROGRESS NOTE Melvina Jeffers participated in a Morning Process Group on the Geriatric Unit, with a focus identifying feelings, planning for the day, and singing. Group time: 50 minutes. Personal goal for participation: To increase the capacity to shift ones mood, prepare for the day, and share in group singing. Goal orientation: The patient will be able to prepare for the day through group singing. Group therapy participation: When prompted, this patient actively participated in the group. Therapeutic interventions reviewed and discussed: The group members were introduce themselves by first names and participate in group singing as a way to increase their oxygen and blood flow and begin their day on a positive note. They were also asked to join in singing several songs. Impression of participation: The patient said she had a \"rough\" night due to pain in her hip and groin area. She also mentioned having one of her teeth in back on right side hurting her. She requested a Tylenol from one of the nursing staff and said she was scheduled for a CT scan later today. She also indicated that she wanted to take a shower. She was alert, generally oriented, and hyper-verbal. She expressed no current SI/HI or overt psychosis. Her affect was anxious and her mood reflected her affect.

## 2018-07-31 NOTE — BH NOTES
Pt cooperative with IM rocephin. She states she already had her sleeping pill and cannot go down to [facility]. Nurse is paging Dr Lulu De La Rosa to ask if it can be done in am instead per pt request.  Call [facility] at bedside.  Dr Lulu De La Rosa has said CT scan can be done in am.

## 2018-07-31 NOTE — PROGRESS NOTES
GROUP THERAPY PROGRESS NOTE Parish Hernández is participating in Goals Group. Group time: 30 minutes Personal goal for participation: Review daily goals Goal orientation: community Group therapy participation: active Therapeutic interventions reviewed and discussed: Making daily achievable goals, to set self up for daily successes Impression of participation: engaged

## 2018-07-31 NOTE — PROGRESS NOTES
Was re consulted on patient for possible UTI, patient with dysuria and pelvic pain No fevers, chills or systemic signs of infection No other complaints 
alerrt x3 PEERL 
CTAB 
S1S2 S/mildly tender in the left groin, no r/no g/ BS + No c/c/e 
 will start patient on IM rocephin,CT abd and pelvis, labs in am 
Urine CX 
monitor Sign off if CT negative

## 2018-07-31 NOTE — BH NOTES
PSYCHIATRIC PROGRESS NOTE Patient Name  Melvina Jeffers Date of Birth 1945 [facility] [id number] Medical Record Number  [id number] Age  68 y.o. PCP None Admit date:  7/25/2018 Room Number  725/02  @ [facility]  
Date of Service  7/31/2018 PSYCHOTHERAPY SESSION NOTE: 
Length of psychotherapy session: 20 minutes Main condition/diagnosis/issues treated during session today, 7/31/2018 : kar, medications, coping skills I employed Cognitive Behavioral therapy techniques, Reality-Oriented psychotherapy, as well as supportive psychotherapy in regards to various ongoing psychosocial stressors, including the following: pre-admission and current problems; medical issues; and stress of hospitalization. Interpersonal relationship issues and psychodynamic conflicts explored. Attempts made to alleviate maladaptive patterns. We, also, worked on issues of denial & effects of substance dependency/use Overall, patient is not progressing Treatment Plan Update (reviewed an updated 7/31/2018) : I will modify psychotherapy tx plan by implementing more stress management strategies, building upon cognitive behavioral techniques, increasing coping skills, as well as shoring up psychological defenses). An extended energy and skill set was needed to engage pt in psychotherapy due to some of the following: resistiveness, complexity, negativity, confrontational nature, hostile behaviors, and/or severe abnormalities in thought processes/psychosis resulting in the loss of expressive/receptive language communication skills. E & M PROGRESS NOTE: 
  
 
 
HISTORY  
   
CC:  \"kar\" HISTORY OF PRESENT ILLNESS/INTERVAL HISTORY:  (reviewed/updated 7/31/2018). per initial evaluation: The patient, Melvina Jeffers, is a 68 y.o.   WHITE [place]  female with a past psychiatric history significant for bipolar disorder , who presents at this time with complaints of (and/or evidence of) the following emotional symptoms: kar. Additional symptomatology include agitation. The above symptoms have been present for years. These symptoms are of severe severity. These symptoms are constant  in nature. The patient's condition has been precipitated by medication non complianceand psychosocial stressors (arguing with selene  ). Patient's condition made worse by  treatment noncompliance. UDS: +negative; BAL=0 Óscar Morgan presents/reports/evidences the following emotional symptoms today, 7/31/2018:kar. The above symptoms have been present for years. These symptoms are of severe severity. The symptoms are constant  in nature. Additional symptomatology and features include pressured speech, looseness of assoc. , psychosis, mood lability. (+)prns. Poor insight 7/31 18- Much improved regarding cognitive disorganization and pressured speech. Demonstarting awareness that medication has been helplful. No prn's. 
7/31/18- No PRN's. Bathing, caring for her own ADL's. Needs encouragement to comply with lithium and Zyprexa. SIDE EFFECTS: (reviewed/updated 7/31/2018) None reported or admitted to. No noted toxicity with use of Depakote/Tegretol/lithium/Clozaril/TCAs ALLERGIES:(reviewed/updated 7/31/2018) No Known Allergies MEDICATIONS PRIOR TO ADMISSION:(reviewed/updated 7/31/2018) Prescriptions Prior to Admission Medication Sig  cholecalciferol (VITAMIN D3) 1,000 unit tablet Take 1,000 Units by mouth daily.  QUEtiapine (SEROQUEL) 100 mg tablet Take 100 mg by mouth nightly. Indications: Kar associated with Bipolar Disorder  metFORMIN (GLUCOPHAGE) 500 mg tablet Take  by mouth daily (with breakfast). Indications: type 2 diabetes mellitus  Omega-3 Fatty Acids (FISH OIL) 500 mg cap Take 1,000 mg by mouth. Indications: hypertriglyceridemia  lisinopril (PRINIVIL, ZESTRIL) 2.5 mg tablet Take 2.5 mg by mouth daily. Indications: hypertension PAST MEDICAL HISTORY: Past medical history from the initial psychiatric evaluation has been reviewed (reviewed/updated 7/31/2018) with no additional updates (I asked patient and no additional past medical history provided). Past Medical History:  
Diagnosis Date  Anxiety  Bipolar 1 disorder [facility])   
 son not sure where patient was diagnosed  Diabetes ([facility] [address]  Hypertension History reviewed. No pertinent surgical history. SOCIAL HISTORY: Social history from the initial psychiatric evaluation has been reviewed (reviewed/updated 7/31/2018) with no additional updates (I asked patient and no additional social history provided). Social History Social History  Marital status:  Spouse name: N/A  
 Number of children: N/A  
 Years of education: N/A Occupational History  Not on file. Social History Main Topics  Smoking status: Never Smoker  Smokeless tobacco: Never Used  Alcohol use No  
 Drug use: Not on file  Sexual activity: Not on file Other Topics Concern  Not on file Social History Narrative 68year old  female admitted voluntarily for psyychotic behavior. Pt has paranoid delusions. She is widodef and lives alone. She was [facility] [place] [facility] 3 days ago. FAMILY HISTORY: Family history from the initial psychiatric evaluation has been reviewed (reviewed/updated 7/31/2018) with no additional updates (I asked patient and no additional family history provided). History reviewed. No pertinent family history. REVIEW OF SYSTEMS: (reviewed/updated 7/31/2018) Appetite:no change from normal  
Sleep: no change All other Review of Systems: Psychological ROS: positive for - concentration difficulties Cardiovascular ROS: no chest pain or dyspnea on exertion Gastrointestinal ROS: no abdominal pain, change in bowel habits, or black or bloody stools Sarst MENTAL STATUS EXAM (MSE): MSE FINDINGS ARE WITHIN NORMAL LIMITS (WNL) UNLESS OTHERWISE STATED BELOW. ( ALL OF THE BELOW CATEGORIES OF THE MSE HAVE BEEN REVIEWED (reviewed 7/31/2018) AND UPDATED AS DEEMED APPROPRIATE ) General Presentation age appropriate, cooperative Orientation oriented to time, place and person Vital Signs  See below (reviewed 7/31/2018); Vital Signs (BP, Pulse, & Temp) are within normal limits if not listed below. Gait and Station Stable/steady, no ataxia Musculoskeletal System No extrapyramidal symptoms (EPS); no abnormal muscular movements or Tardive Dyskinesia (TD); muscle strength and tone are within normal limits Language No aphasia or dysarthria Speech:  pressured Thought Processes disorganized; fast rate of thoughts; poor abstract reasoning/computation Thought Associations loose associations and tangential  
Thought Content bizarre delusions Suicidal Ideations none Homicidal Ideations none Mood:  irritable Affect:  mood-congruent Memory recent  fair Memory remote:  fair Concentration/Attention:  distractable Fund of Knowledge average Insight:  limited Reliability poor Judgment:  limited VITALS:    
Patient Vitals for the past 24 hrs: 
 Temp Pulse Resp BP SpO2  
07/31/18 0910 97.8 °F (36.6 °C) 91 16 138/76 96 % 07/30/18 2004 98.4 °F (36.9 °C) 73 18 117/65 -  
07/30/18 1515 97.3 °F (36.3 °C) 72 18 132/77 97 % Wt Readings from Last 3 Encounters:  
07/25/18 69.9 kg (154 lb) 07/09/17 69.9 kg (154 lb 3.2 oz) Temp Readings from Last 3 Encounters:  
07/31/18 97.8 °F (36.6 °C)  
07/09/17 98.6 °F (37 °C) BP Readings from Last 3 Encounters:  
07/31/18 138/76  
07/09/17 190/90 Pulse Readings from Last 3 Encounters:  
07/31/18 91  
07/09/17 99 DATA LABORATORY DATA:(reviewed/updated 7/31/2018) Recent Results (from the past 24 hour(s)) GLUCOSE, POC Collection Time: 07/30/18  3:57 PM  
Result Value Ref Range Glucose (POC) 134 (H) 65 - 100 mg/dL  Performed by Dominguez Kurtz URINALYSIS W/MICROSCOPIC Collection Time: 07/30/18  5:45 PM  
Result Value Ref Range Color YELLOW/STRAW Appearance CLEAR CLEAR Specific gravity 1.014 1.003 - 1.030    
 pH (UA) 5.5 5.0 - 8.0 Protein NEGATIVE  NEG mg/dL Glucose NEGATIVE  NEG mg/dL Ketone NEGATIVE  NEG mg/dL Bilirubin NEGATIVE  NEG Blood NEGATIVE  NEG Urobilinogen 1.0 0.2 - 1.0 EU/dL Nitrites NEGATIVE  NEG Leukocyte Esterase NEGATIVE  NEG    
 WBC 5-10 0 - 4 /hpf  
 RBC 0-5 0 - 5 /hpf Epithelial cells MODERATE (A) FEW /lpf Bacteria 1+ (A) NEG /hpf URINE CULTURE HOLD SAMPLE Collection Time: 07/30/18  5:45 PM  
Result Value Ref Range Urine culture hold URINE ON HOLD IN MICROBIOLOGY DEPT FOR 3 DAYS. IF UNPRESERVED URINE IS SUBMITTED, IT CANNOT BE USED FOR ADDITIONAL TESTING AFTER 24 HRS, RECOLLECTION WILL BE REQUIRED. GLUCOSE, POC Collection Time: 07/30/18  8:03 PM  
Result Value Ref Range Glucose (POC) 117 (H) 65 - 100 mg/dL Performed by Nabeel Cunningham LACTIC ACID Collection Time: 07/31/18  6:15 AM  
Result Value Ref Range Lactic acid 0.8 0.4 - 2.0 MMOL/L  
CBC W/O DIFF Collection Time: 07/31/18  6:15 AM  
Result Value Ref Range WBC 10.8 3.6 - 11.0 K/uL  
 RBC 4.37 3.80 - 5.20 M/uL  
 HGB 13.5 11.5 - 16.0 g/dL HCT 40.3 35.0 - 47.0 % MCV 92.2 80.0 - 99.0 FL  
 MCH 30.9 26.0 - 34.0 PG  
 MCHC 33.5 30.0 - 36.5 g/dL  
 RDW 11.8 11.5 - 14.5 % PLATELET 219 377 - 866 K/uL MPV 10.2 8.9 - 12.9 FL  
 NRBC 0.0 0  WBC ABSOLUTE NRBC 0.00 0.00 - 0.01 K/uL METABOLIC PANEL, BASIC Collection Time: 07/31/18  6:15 AM  
Result Value Ref Range Sodium 141 136 - 145 mmol/L Potassium 3.8 3.5 - 5.1 mmol/L Chloride 107 97 - 108 mmol/L  
 CO2 24 21 - 32 mmol/L Anion gap 10 5 - 15 mmol/L Glucose 101 (H) 65 - 100 mg/dL BUN 12 6 - 20 MG/DL  Creatinine 0.58 0.55 - 1.02 MG/DL  
 BUN/Creatinine ratio 21 (H) 12 - 20 GFR est AA >60 >60 ml/min/1.73m2 GFR est non-AA >60 >60 ml/min/1.73m2 Calcium 9.1 8.5 - 10.1 MG/DL  
GLUCOSE, POC Collection Time: 07/31/18  7:57 AM  
Result Value Ref Range Glucose (POC) 114 (H) 65 - 100 mg/dL Performed by Anton Garcia GLUCOSE, POC Collection Time: 07/31/18 11:51 AM  
Result Value Ref Range Glucose (POC) 112 (H) 65 - 100 mg/dL Performed by Steven Shine No results found for: VALF2, VALAC, VALP, VALPR, DS6, CRBAM, CRBAMP, CARB2, XCRBAM 
No results found for: LITHM  
RADIOLOGY REPORTS:(reviewed/updated 7/31/2018) Ct Abd Pelv Wo Cont Result Date: 7/26/2018 EXAM:  CT ABDOMEN PELVIS WITHOUT CONTRAST INDICATION:  LLQ pain COMPARISON: None. . TECHNIQUE: Unenhanced multislice helical CT was performed from the diaphragm to the symphysis pubis without intravenous contrast administration. Contiguous 5 mm axial images were reconstructed and lung and soft tissue windows were generated. Coronal and sagittal reformations were generated. CT dose reduction was achieved through use of a standardized protocol tailored for this examination and automatic exposure control for dose modulation. Dora Dominguez FINDINGS: INCIDENTALLY IMAGED CHEST: Heart/vessels: Within normal limits. Lungs/Pleura: Calcified nodule in the right lower lobe. . ABDOMEN: Liver: Calculus in the liver suggesting remote granulomatous disease. Gallbladder/Biliary: Status post cholecystectomy. Spleen: Calculus in the spleen suggesting remote granulomatous disease. Pancreas: Pancreatic atrophy. There are a few calculi within lymph nodes at the jacquelyn hepatis Adrenals: Within normal limits. Kidneys: Punctate calculus in the interpolar region of the right kidney. Distended renal pelves, left greater than right Peritoneum/Mesenteries: Minimal perez appearance of the central mesentery. Extraperitoneum: Within normal limits. Gastrointestinal tract: Diverticulosis in the descending and sigmoid colon.  Normal-appearing appendix Vascular: Calcifications in the aorta. Right gonadal vein phleboliths. Sonja Cheryl PELVIS: Extraperitoneum: The floor the pelvis suggesting phleboliths. Ureters: Within normal limits. Bladder: There is a locule of gas in the antidependent portion of the bladder. Reproductive System: Status post hysterectomy. . MSK: Extensive degenerative change throughout the visualized spine, nonfocal. Osteopenia. Tiny, fat-containing umbilical hernia . IMPRESSION: 1. There is a single locule of gas in the antidependent portion of the bladder. Recommend clinical correlation for recent instrumentation versus cystitis 2. There is diverticulosis in the descending and sigmoid colon; however, there is no evidence of diverticulitis. 3. Incidental findings as above. MEDICATIONS ALL MEDICATIONS:  
Current Facility-Administered Medications Medication Dose Route Frequency  metFORMIN (GLUCOPHAGE) tablet 500 mg  500 mg Oral BID WITH MEALS  
 atorvastatin (LIPITOR) tablet 40 mg  40 mg Oral DAILY  OLANZapine (ZyPREXA) tablet 10 mg  10 mg Oral QHS  lithium carbonate CR (ESKALITH CR) tablet 900 mg  900 mg Oral QHS  phenazopyridine (PYRIDIUM) tablet 200 mg  200 mg Oral BID  cefTRIAXone (ROCEPHIN) 1 g in lidocaine (PF) (XYLOCAINE) 10 mg/mL (1 %) IM injection  1 g IntraMUSCular Q24H  
 lidocaine (LIDODERM) 5 % patch 3 Patch  3 Patch TransDERmal Q24H  
 benztropine (COGENTIN) tablet 1 mg  1 mg Oral BID PRN  
 benztropine (COGENTIN) injection 1 mg  1 mg IntraMUSCular BID PRN  
 acetaminophen (TYLENOL) tablet 650 mg  650 mg Oral Q4H PRN  
 ibuprofen (MOTRIN) tablet 400 mg  400 mg Oral Q8H PRN  
 magnesium hydroxide (MILK OF MAGNESIA) 400 mg/5 mL oral suspension 30 mL  30 mL Oral DAILY PRN  
 nicotine (NICODERM CQ) 21 mg/24 hr patch 1 Patch  1 Patch TransDERmal DAILY PRN  
 miconazole (SECURA) 2 % extra thick cream   Topical BID  LORazepam (ATIVAN) tablet 2 mg  2 mg Oral Q6H PRN  Or  
 LORazepam (ATIVAN) injection 2 mg  2 mg IntraMUSCular Q6H PRN  
 diphenhydrAMINE (BENADRYL) capsule 50 mg  50 mg Oral Q6H PRN Or  
 diphenhydrAMINE (BENADRYL) injection 50 mg  50 mg IntraMUSCular Q6H PRN  
 haloperidol (HALDOL) tablet 5 mg  5 mg Oral Q6H PRN Or  
 haloperidol lactate (HALDOL) injection 5 mg  5 mg IntraMUSCular Q6H PRN  
 insulin lispro (HUMALOG) injection   SubCUTAneous AC&HS  
 glucose chewable tablet 16 g  4 Tab Oral PRN  
 dextrose (D50W) injection syrg 12.5-25 g  12.5-25 g IntraVENous PRN  
 glucagon (GLUCAGEN) injection 1 mg  1 mg IntraMUSCular PRN  
  
SCHEDULED MEDICATIONS:  
Current Facility-Administered Medications Medication Dose Route Frequency  metFORMIN (GLUCOPHAGE) tablet 500 mg  500 mg Oral BID WITH MEALS  
 atorvastatin (LIPITOR) tablet 40 mg  40 mg Oral DAILY  OLANZapine (ZyPREXA) tablet 10 mg  10 mg Oral QHS  lithium carbonate CR (ESKALITH CR) tablet 900 mg  900 mg Oral QHS  phenazopyridine (PYRIDIUM) tablet 200 mg  200 mg Oral BID  cefTRIAXone (ROCEPHIN) 1 g in lidocaine (PF) (XYLOCAINE) 10 mg/mL (1 %) IM injection  1 g IntraMUSCular Q24H  
 lidocaine (LIDODERM) 5 % patch 3 Patch  3 Patch TransDERmal Q24H  
 miconazole (SECURA) 2 % extra thick cream   Topical BID  insulin lispro (HUMALOG) injection   SubCUTAneous AC&HS  
  
 
 
ASSESSMENT & PLAN  
 
DIAGNOSES REQUIRING ACTIVE TREATMENT AND MONITORING: (reviewed/updated 7/31/2018) Patient Active Hospital Problem List: 
 [facility] (7/26/2018) Assessment: kar alternating with depression Plan: mood stabilizer and antispychotic Continue Eskalith  
7/28- Start Olanzapine 2.5mg at night 7/29- increase Olanzapine to 5mg at night, continue lithium I will continue to monitor blood levels (Depakote, Tegretol, lithium, clozapine---a drug with a narrow therapeutic index= NTI) and associated labs for drug therapy implemented that require intense monitoring for toxicity as deemed appropriate based on current medication side effects and pharmacodynamically determined drug 1/2 lives. In summary, Theresa Tellez, is a 68 y.o.  female who presents with a severe exacerbation of the principal diagnosis of Bipolar disorder ([facility] [address] Patient's condition is worsening/not improving/not stable . Patient requires continued inpatient hospitalization for further stabilization, safety monitoring and medication management. I will continue to coordinate the provision of individual, milieu, occupational, group, and substance abuse therapies to address target symptoms/diagnoses as deemed appropriate for the individual patient. A coordinated, multidisplinary treatment team round was conducted with the patient (this team consists of the nurse, psychiatric unit pharmcist,  and writer). Complete current electronic health record for patient has been reviewed today including consultant notes, ancillary staff notes, nurses and psychiatric tech notes. Suicide risk assessment completed and patient deemed to be of low risk for suicide at this time. The following regarding medications was addressed during rounds with patient:  
the risks and benefits of the proposed medication. The patient was given the opportunity to ask questions. Informed consent given to the use of the above medications. Will continue to adjust psychiatric and non-psychiatric medications (see above \"medication\" section and orders section for details) as deemed appropriate & based upon diagnoses and response to treatment. I will continue to order blood tests/labs and diagnostic tests as deemed appropriate and review results as they become available (see orders for details and above listed lab/test results). I will order psychiatric records from previous [facility] hospitals to further elucidate the nature of patient's psychopathology and review once available.  
 
I will gather additional collateral information from friends, family and o/p treatment team to further elucidate the nature of patient's psychopathology and baselline level of psychiatric functioning. I certify that this patient's inpatient psychiatric hospital services furnished since the previous certification were, and continue to be, required for treatment that could reasonably be expected to improve the patient's condition, or for diagnostic study, and that the patient continues to need, on a daily basis, active treatment furnished directly by or requiring the supervision of inpatient psychiatric facility personnel. In addition, the hospital records show that services furnished were intensive treatment services, admission or related services, or equivalent services. EXPECTED DISCHARGE DATE/DAY: TBD  
 
DISPOSITION: Home Signed By:  
Seema Cotter MD 
7/31/2018

## 2018-07-31 NOTE — PROGRESS NOTES
Problem: Manic Behavior (Adult/Pediatric) Goal: *STG: Participates in treatment plan Outcome: Progressing Towards Goal 
Out on unit social w peers and staff. Mood and affect remain labile. Thoughts loose and tangential with improved organization, decrease in intrusive behaviors, demonstrates appropriate behaviors when given direction and prompts by nursing staff. Non compliant with psychiatric medications last evening currently states she will take them this evening. Staff focus is on encouraging mediation complaince

## 2018-08-01 VITALS
TEMPERATURE: 98.1 F | WEIGHT: 154 LBS | DIASTOLIC BLOOD PRESSURE: 64 MMHG | SYSTOLIC BLOOD PRESSURE: 145 MMHG | HEIGHT: 65 IN | RESPIRATION RATE: 16 BRPM | HEART RATE: 86 BPM | BODY MASS INDEX: 25.66 KG/M2 | OXYGEN SATURATION: 97 %

## 2018-08-01 LAB
DATE LAST DOSE: NORMAL
GLUCOSE BLD STRIP.AUTO-MCNC: 106 MG/DL (ref 65–100)
GLUCOSE BLD STRIP.AUTO-MCNC: 113 MG/DL (ref 65–100)
GLUCOSE BLD STRIP.AUTO-MCNC: 138 MG/DL (ref 65–100)
LITHIUM SERPL-SCNC: 0.73 MMOL/L (ref 0.6–1.2)
REPORTED DOSE,DOSE: NORMAL UNITS
REPORTED DOSE/TIME,TMG: NORMAL
SERVICE CMNT-IMP: ABNORMAL

## 2018-08-01 PROCEDURE — 74011250637 HC RX REV CODE- 250/637: Performed by: PSYCHIATRY & NEUROLOGY

## 2018-08-01 PROCEDURE — 80178 ASSAY OF LITHIUM: CPT | Performed by: PSYCHIATRY & NEUROLOGY

## 2018-08-01 PROCEDURE — 36415 COLL VENOUS BLD VENIPUNCTURE: CPT | Performed by: PSYCHIATRY & NEUROLOGY

## 2018-08-01 PROCEDURE — 82962 GLUCOSE BLOOD TEST: CPT

## 2018-08-01 RX ADMIN — ATORVASTATIN CALCIUM 40 MG: 40 TABLET, FILM COATED ORAL at 09:23

## 2018-08-01 RX ADMIN — ACETAMINOPHEN 650 MG: 325 TABLET, FILM COATED ORAL at 17:59

## 2018-08-01 RX ADMIN — PHENAZOPYRIDINE HYDROCHLORIDE 200 MG: 100 TABLET ORAL at 09:23

## 2018-08-01 NOTE — BH NOTES
Patient actively participating in groups/activities on unit; plan is to be discharged this evening; patient less manic; future focused; continuing with q15min safety checks and environmental safety checks during this shift

## 2018-08-01 NOTE — PROGRESS NOTES
Problem: Manic Behavior (Adult/Pediatric) Goal: *STG/LTG: Complies with medication therapy Outcome: Progressing Towards Goal 
Pt took scheduled at [facility], which patient has been refusing. Pt spoke to son, Rocio Hopkins, on the phone and was elated over the news that her 8 day old grandchild is doing well.

## 2018-08-01 NOTE — INTERDISCIPLINARY ROUNDS
Behavioral Health Interdisciplinary Rounds Patient Name: Benji Estrada  Age: 68 y.o. Room/Bed:  725/02 Primary [facility]: [facility] Admission Status: Voluntary Readmission within 30 days: yes Power of  in place: no 
Patient requires a blocked bed: no          Reason for blocked bed: n/a 
 
VTE Prophylaxis: Not indicated Mobility needs/Fall risk: yes Nutritional Plan: no 
Consults: no        
Labs/Testing due today?: yes: collected Sleep hours: 6.25 Participation in Care/Groups:  yes Medication Compliant?: Yes PRNS (last 24 hours): Pain Restraints (last 24 hours):  no 
  
CIWA (range last 24 hours): COWS (range last 24 hours): Alcohol screening (AUDIT) completed -   AUDIT Score: 0 If applicable, date SBIRT discussed in treatment team AND documented:  
 
Tobacco - patient is a smoker: Have You Used Tobacco in the Past 30 Days: No 
Illegal Drugs use: Have You Used Any Illegal Substances Over the Past 12 Months: No 
 
24 hour chart check complete: yes Patient goal(s) for today: Discharge Treatment team focus/goals: Schedule follow-up; discharge Progress note: Patient reported she is ready for discharge LOS:  6  Expected LOS: 6 Financial concerns/prescription coverage:  Medicare Date of last family contact:  None, Patient declined Family requesting physician contact today:  No 
Discharge plan: Return home Guns in the home: No     
Outpatient provider(s): [facility] Participating treatment team members: Benji Estrada, JE Bernard; Dr. Ant Swain MD; Curtis Saul, RN; Carloz Donovan, NANCY; Svetlana Grande, LizaD

## 2018-08-01 NOTE — PROGRESS NOTES
Problem: Falls - Risk of 
Goal: *Absence of Falls Document Barb President Fall Risk and appropriate interventions in the flowsheet. Outcome: Progressing Towards Goal 
Fall Risk Interventions: 
Mobility Interventions: Patient to call before getting OOB Mentation Interventions: Adequate sleep, hydration, pain control, Room close to nurse's station Medication Interventions: Teach patient to arise slowly, Patient to call before getting OOB Elimination Interventions: Bed/chair exit alarm, Patient to call for help with toileting needs 2330 Pt appears asleep in bed. Respirations even and unlabored. Call bell within reach. Will monitor with Q 15 safety checks.

## 2018-08-01 NOTE — BH NOTES
Behavioral Health Transition Record to Provider Patient Name: Joe Maya YOB: 1945 Medical Record Number: [id number] Date of Admission: 7/25/2018 Date of Discharge: 8/1/2018 Attending Provider: Rolf Guido MD 
Discharging Provider: Rolf Guido MD 
To contact this individual call [phone] and ask the  to page. If unavailable, ask to be transferred to [address] [place] [address] Provider on call. [facility] Provider will be available on call 24/7 and during holidays. Primary Care Provider: None No Known Allergies Reason for Admission: Pt admitted under a voluntary basis for severe psychosis and kar proving to be an imminent danger to self and others and an inability to care for self. Admission Diagnosis: Psychosis * No surgery found * Results for orders placed or performed during the hospital encounter of 07/25/18 URINE CULTURE HOLD SAMPLE Result Value Ref Range Urine culture hold URINE ON HOLD IN MICROBIOLOGY DEPT FOR 3 DAYS. IF UNPRESERVED URINE IS SUBMITTED, IT CANNOT BE USED FOR ADDITIONAL TESTING AFTER 24 HRS, RECOLLECTION WILL BE REQUIRED. URINE CULTURE HOLD SAMPLE Result Value Ref Range Urine culture hold URINE ON HOLD IN MICROBIOLOGY DEPT FOR 3 DAYS. IF UNPRESERVED URINE IS SUBMITTED, IT CANNOT BE USED FOR ADDITIONAL TESTING AFTER 24 HRS, RECOLLECTION WILL BE REQUIRED. CBC WITH AUTOMATED DIFF Result Value Ref Range WBC 5.7 3.6 - 11.0 K/uL  
 RBC 4.10 3.80 - 5.20 M/uL  
 HGB 12.7 11.5 - 16.0 g/dL HCT 37.3 35.0 - 47.0 % MCV 91.0 80.0 - 99.0 FL  
 MCH 31.0 26.0 - 34.0 PG  
 MCHC 34.0 30.0 - 36.5 g/dL  
 RDW 11.7 11.5 - 14.5 % PLATELET 017 977 - 173 K/uL MPV 9.8 8.9 - 12.9 FL  
 NRBC 0.0 0  WBC ABSOLUTE NRBC 0.00 0.00 - 0.01 K/uL NEUTROPHILS 57 32 - 75 % LYMPHOCYTES 33 12 - 49 % MONOCYTES 8 5 - 13 % EOSINOPHILS 1 0 - 7 % BASOPHILS 1 0 - 1 %  IMMATURE GRANULOCYTES 0 0.0 - 0.5 % ABS. NEUTROPHILS 3.2 1.8 - 8.0 K/UL  
 ABS. LYMPHOCYTES 1.9 0.8 - 3.5 K/UL  
 ABS. MONOCYTES 0.5 0.0 - 1.0 K/UL  
 ABS. EOSINOPHILS 0.1 0.0 - 0.4 K/UL  
 ABS. BASOPHILS 0.0 0.0 - 0.1 K/UL  
 ABS. IMM. GRANS. 0.0 0.00 - 0.04 K/UL  
 DF AUTOMATED METABOLIC PANEL, COMPREHENSIVE Result Value Ref Range Sodium 142 136 - 145 mmol/L Potassium 3.0 (L) 3.5 - 5.1 mmol/L Chloride 104 97 - 108 mmol/L  
 CO2 28 21 - 32 mmol/L Anion gap 10 5 - 15 mmol/L Glucose 196 (H) 65 - 100 mg/dL BUN 14 6 - 20 MG/DL Creatinine 0.61 0.55 - 1.02 MG/DL  
 BUN/Creatinine ratio 23 (H) 12 - 20 GFR est AA >60 >60 ml/min/1.73m2 GFR est non-AA >60 >60 ml/min/1.73m2 Calcium 9.0 8.5 - 10.1 MG/DL Bilirubin, total 0.5 0.2 - 1.0 MG/DL  
 ALT (SGPT) 33 12 - 78 U/L  
 AST (SGOT) 20 15 - 37 U/L Alk. phosphatase 57 45 - 117 U/L Protein, total 7.0 6.4 - 8.2 g/dL Albumin 3.6 3.5 - 5.0 g/dL Globulin 3.4 2.0 - 4.0 g/dL A-G Ratio 1.1 1.1 - 2.2 ACETAMINOPHEN Result Value Ref Range Acetaminophen level <2 (L) 10 - 30 ug/mL SALICYLATE Result Value Ref Range Salicylate level <4.9 (L) 2.8 - 20.0 MG/DL  
DRUG SCREEN, URINE Result Value Ref Range AMPHETAMINES NEGATIVE  NEG    
 BARBITURATES NEGATIVE  NEG BENZODIAZEPINES NEGATIVE  NEG    
 COCAINE NEGATIVE  NEG METHADONE NEGATIVE  NEG    
 OPIATES NEGATIVE  NEG    
 PCP(PHENCYCLIDINE) NEGATIVE  NEG    
 THC (TH-CANNABINOL) NEGATIVE  NEG Drug screen comment (NOTE) URINALYSIS W/MICROSCOPIC Result Value Ref Range Color YELLOW Appearance CLEAR CLEAR Specific gravity 1.007 1.003 - 1.030    
 pH (UA) 6.5 5.0 - 8.0 Protein NEGATIVE  NEG mg/dL Glucose NEGATIVE  NEG mg/dL Ketone NEGATIVE  NEG mg/dL Bilirubin NEGATIVE  NEG Blood MODERATE (A) NEG Urobilinogen 0.2 0.2 - 1.0 EU/dL Nitrites NEGATIVE  NEG Leukocyte Esterase NEGATIVE  NEG    
 WBC 0-4 0 - 4 /hpf  
 RBC 0-5 0 - 5 /hpf  Epithelial cells FEW FEW /lpf Bacteria NEGATIVE  NEG /hpf ETHYL ALCOHOL Result Value Ref Range ALCOHOL(ETHYL),SERUM <10 <10 MG/DL  
TSH 3RD GENERATION Result Value Ref Range TSH 1.04 0.36 - 3.74 uIU/mL HEMOGLOBIN A1C WITH EAG Result Value Ref Range Hemoglobin A1c 6.3 4.2 - 6.3 % Est. average glucose 134 mg/dL GLUCOSE, FASTING Result Value Ref Range Glucose 114 (H) 65 - 100 MG/DL  
LIPID PANEL Result Value Ref Range LIPID PROFILE Cholesterol, total 186 <200 MG/DL Triglyceride 154 (H) <150 MG/DL  
 HDL Cholesterol 36 MG/DL  
 LDL, calculated 119.2 (H) 0 - 100 MG/DL VLDL, calculated 30.8 MG/DL  
 CHOL/HDL Ratio 5.2 (H) 0 - 5.0    
URINALYSIS W/MICROSCOPIC Result Value Ref Range Color YELLOW/STRAW Appearance CLEAR CLEAR Specific gravity 1.014 1.003 - 1.030    
 pH (UA) 5.5 5.0 - 8.0 Protein NEGATIVE  NEG mg/dL Glucose NEGATIVE  NEG mg/dL Ketone NEGATIVE  NEG mg/dL Bilirubin NEGATIVE  NEG Blood NEGATIVE  NEG Urobilinogen 1.0 0.2 - 1.0 EU/dL Nitrites NEGATIVE  NEG Leukocyte Esterase NEGATIVE  NEG    
 WBC 5-10 0 - 4 /hpf  
 RBC 0-5 0 - 5 /hpf Epithelial cells MODERATE (A) FEW /lpf Bacteria 1+ (A) NEG /hpf LACTIC ACID Result Value Ref Range Lactic acid 0.8 0.4 - 2.0 MMOL/L  
CBC W/O DIFF Result Value Ref Range WBC 10.8 3.6 - 11.0 K/uL  
 RBC 4.37 3.80 - 5.20 M/uL  
 HGB 13.5 11.5 - 16.0 g/dL HCT 40.3 35.0 - 47.0 % MCV 92.2 80.0 - 99.0 FL  
 MCH 30.9 26.0 - 34.0 PG  
 MCHC 33.5 30.0 - 36.5 g/dL  
 RDW 11.8 11.5 - 14.5 % PLATELET 010 758 - 599 K/uL MPV 10.2 8.9 - 12.9 FL  
 NRBC 0.0 0  WBC ABSOLUTE NRBC 0.00 0.00 - 0.01 K/uL METABOLIC PANEL, BASIC Result Value Ref Range Sodium 141 136 - 145 mmol/L Potassium 3.8 3.5 - 5.1 mmol/L Chloride 107 97 - 108 mmol/L  
 CO2 24 21 - 32 mmol/L Anion gap 10 5 - 15 mmol/L Glucose 101 (H) 65 - 100 mg/dL BUN 12 6 - 20 MG/DL  Creatinine 0.58 0.55 - 1.02 MG/DL  
 BUN/Creatinine ratio 21 (H) 12 - 20 GFR est AA >60 >60 ml/min/1.73m2 GFR est non-AA >60 >60 ml/min/1.73m2 Calcium 9.1 8.5 - 10.1 MG/DL  
LITHIUM Result Value Ref Range Lithium level 0.73 0.60 - 1.20 MMOL/L Reported dose date: NOT PROVIDED Reported dose time: NOT PROVIDED Reported dose: NOT PROVIDED UNITS  
GLUCOSE, POC Result Value Ref Range Glucose (POC) 167 (H) 65 - 100 mg/dL Performed by Anu Kline   
GLUCOSE, POC Result Value Ref Range Glucose (POC) 167 (H) 65 - 100 mg/dL Performed by Khadijah Law, POC Result Value Ref Range Glucose (POC) 106 (H) 65 - 100 mg/dL Performed by Tsosie Justice GLUCOSE, POC Result Value Ref Range Glucose (POC) 96 65 - 100 mg/dL Performed by Tsosie Justice GLUCOSE, POC Result Value Ref Range Glucose (POC) 124 (H) 65 - 100 mg/dL Performed by Jael Estancia ([place]) WONG   
GLUCOSE, POC Result Value Ref Range Glucose (POC) 112 (H) 65 - 100 mg/dL Performed by [employer] ([place]) WONG   
GLUCOSE, POC Result Value Ref Range Glucose (POC) 111 (H) 65 - 100 mg/dL Performed by Judy Dyer GLUCOSE, POC Result Value Ref Range Glucose (POC) 139 (H) 65 - 100 mg/dL Performed by Tsosie Justice GLUCOSE, POC Result Value Ref Range Glucose (POC) 121 (H) 65 - 100 mg/dL Performed by [place] ([place]) WONG   
GLUCOSE, POC Result Value Ref Range Glucose (POC) 125 (H) 65 - 100 mg/dL Performed by Janita Essex GLUCOSE, POC Result Value Ref Range Glucose (POC) 103 (H) 65 - 100 mg/dL Performed by Tsosie Justice GLUCOSE, POC Result Value Ref Range Glucose (POC) 148 (H) 65 - 100 mg/dL Performed by Tsosie Justice GLUCOSE, POC Result Value Ref Range Glucose (POC) 104 (H) 65 - 100 mg/dL Performed by Rayray Valles   
GLUCOSE, POC Result Value Ref Range Glucose (POC) 128 (H) 65 - 100 mg/dL Performed by Lamine Waite, POC Result Value Ref Range Glucose (POC) 99 65 - 100 mg/dL Performed by Ralph Lazo GLUCOSE, POC Result Value Ref Range Glucose (POC) 113 (H) 65 - 100 mg/dL Performed by Joe Burnette, POC Result Value Ref Range Glucose (POC) 134 (H) 65 - 100 mg/dL Performed by Ellis Yañez, POC Result Value Ref Range Glucose (POC) 117 (H) 65 - 100 mg/dL Performed by Chau Baez   
GLUCOSE, POC Result Value Ref Range Glucose (POC) 114 (H) 65 - 100 mg/dL Performed by Britton Sepulveda GLUCOSE, POC Result Value Ref Range Glucose (POC) 112 (H) 65 - 100 mg/dL Performed by Jude Jung, POC Result Value Ref Range Glucose (POC) 133 (H) 65 - 100 mg/dL Performed by Maebelle Lombard, POC Result Value Ref Range Glucose (POC) 138 (H) 65 - 100 mg/dL Performed by Chau Baez   
GLUCOSE, POC Result Value Ref Range Glucose (POC) 106 (H) 65 - 100 mg/dL Performed by Ozzy Bourgeoiske GLUCOSE, POC Result Value Ref Range Glucose (POC) 113 (H) 65 - 100 mg/dL Performed by Ozzy Bourgeoiske GLUCOSE, POC Result Value Ref Range Glucose (POC) 138 (H) 65 - 100 mg/dL Performed by Britton Sepulveda EKG, 12 LEAD, INITIAL Result Value Ref Range Ventricular Rate 84 BPM  
 Atrial Rate 84 BPM  
 P-R Interval 128 ms QRS Duration 82 ms Q-T Interval 422 ms QTC Calculation (Bezet) 498 ms Calculated P Axis 76 degrees Calculated R Axis 25 degrees Calculated T Axis 27 degrees Diagnosis Normal sinus rhythm Nonspecific ST and T wave abnormality Prolonged QT No previous ECGs available Confirmed by Wilbert Nick MD, [facility] ([id number]) on 7/26/2018 8:51:32 AM 
  
 
 
Immunizations administered during this encounter: There is no immunization history on file for this patient.  
 
Screening for Metabolic Disorders for Patients on Antipsychotic Medications 
(Data obtained from the EMR) Estimated Body Mass Index Estimated body mass index is 25.63 kg/(m^2) as calculated from the following: 
  Height as of this encounter: 5' 5\" (1.651 m). Weight as of this encounter: 69.9 kg (154 lb). Vital Signs/Blood Pressure Visit Vitals  /64 (BP Patient Position: Sitting)  Pulse 86  Temp 98.1 °F (36.7 °C)  Resp 16  
 Ht 5' 5\" (1.651 m)  Wt 69.9 kg (154 lb)  SpO2 97%  Breastfeeding No  
 BMI 25.63 kg/m2 Blood Glucose/Hemoglobin A1c Lab Results Component Value Date/Time Glucose 101 (H) 2018 06:15 AM  
 Glucose 114 (H) 2018 06:00 AM  
 Glucose (POC) 138 (H) 2018 04:38 PM  
 
 
Lab Results Component Value Date/Time Hemoglobin A1c 6.3 2018 03:30 PM  
 
  
Lipid Panel Lab Results Component Value Date/Time Cholesterol, total 186 2018 06:00 AM  
 HDL Cholesterol 36 2018 06:00 AM  
 LDL, calculated 119.2 (H) 2018 06:00 AM  
 Triglyceride 154 (H) 2018 06:00 AM  
 CHOL/HDL Ratio 5.2 (H) 2018 06:00 AM  
 
  
Discharge Diagnosis: Bipolar disorder (ICD-10-CM: F31.9) Discharge Plan: Patient discharged home into the care of family. DISCHARGE SUMMARY 
 
NAME:Enedelia Watkins : 1945 MRN: [id number] The patient Theresa Tellez exhibits the ability to control behavior in a less restrictive environment. Patient's level of functioning is improving. No assaultive/destructive behavior has been observed for the past 24 hours. No suicidal/homicidal threat or behavior has been observed for the past 24 hours. There is no evidence of serious medication side effects. Patient has not been in physical or protective restraints for at least the past 24 hours. If weapons involved, how are they secured? No weapons involved. Is patient aware of and in agreement with discharge plan? Yes Arrangements for medication:  Prescriptions given to patient.   
  
Referral for substance abuse treatment? Patient is not using using substances/Not applicable. Referral for smoking cessation needed? Patient is not a smoker/Not applicable. Copy of discharge instructions to provider?:  Donnell Francis; Dr. Arelis Mcgregor Arrangements for transportation home:  Son to . Keep all follow up appointments as scheduled, continue to take prescribed medications per physician instructions. Mental health crisis number:  812 or your local mental health crisis line number at [phone]. Discharge Medication List and Instructions:  
Current Discharge Medication List  
  
START taking these medications Details  
phenazopyridine (PYRIDIUM) 200 mg tablet Take 1 Tab by mouth two (2) times a day for 3 days. Indications: URINARY TRACT IRRITATION Qty: 6 Tab, Refills: 0  
  
OLANZapine (ZYPREXA) 10 mg tablet Take 1 Tab by mouth nightly. Indications: BIPOLAR DISORDER IN REMISSION Qty: 30 Tab, Refills: 0  
  
lithium carbonate CR (ESKALITH CR) 450 mg CR tablet Take 2 Tabs by mouth nightly. Indications: Bipolar Disorder 
Qty: 60 Tab, Refills: 0  
  
lidocaine (LIDODERM) 5 % Apply patch to the affected area for 12 hours a day and remove for 12 hours a day. Indications: POSTHERPETIC NEURALGIA Qty: 90 Each, Refills: 0  
  
atorvastatin (LIPITOR) 40 mg tablet Take 1 Tab by mouth daily. Indications: hyperlipidemia Qty: 30 Tab, Refills: 0 CONTINUE these medications which have CHANGED Details  
metFORMIN (GLUCOPHAGE) 500 mg tablet Take 1 Tab by mouth two (2) times daily (with meals). Indications: type 2 diabetes mellitus Qty: 60 Tab, Refills: 0 CONTINUE these medications which have NOT CHANGED Details Omega-3 Fatty Acids (FISH OIL) 500 mg cap Take 1,000 mg by mouth. Indications: hypertriglyceridemia STOP taking these medications  
  
 cholecalciferol (VITAMIN D3) 1,000 unit tablet Comments:  
Reason for Stopping:  QUEtiapine (SEROQUEL) 100 mg tablet Comments:  
Reason for Stopping:   
   
 lisinopril (PRINIVIL, ZESTRIL) 2.5 mg tablet Comments:  
Reason for Stopping:   
   
  
 
 
Unresulted Labs None To obtain results of studies pending at discharge, please contact [phone] Follow-up Information Follow up With Details Comments Contact Info Eduardo Weston On 10/8/2018 You have a 9:00am appointment with your new primary care provider. Please arrive by 8:30am to complete new patient paperwork. Remember to bring your photo ID and insurance card. [address] 
[phone] Aurea Cotter On 10/4/2018 You have a 1:30pm appointment with the psychiatric nurse practitioner for medication management. Please arrive by 1:00pm to complete new patient paperwork. Remember to bring your photo ID and insurance card. [facility] 
[address] [place] [address], [address] 
[phone] Advanced Directive:  
Does the patient have an appointed surrogate decision maker? No 
Does the patient have a Medical Advance Directive? No 
Does the patient have a Psychiatric Advance Directive? No 
If the patient does not have a surrogate or Medical Advance Directive AND Psychiatric Advance Directive, the patient was offered information on these advance directives Yes and Patient declined to complete Patient Instructions: Please continue all medications until otherwise directed by physician. Tobacco Cessation Discharge Plan:  
Is the patient a smoker and needs referral for smoking cessation? No 
Patient referred to the following for smoking cessation with an appointment? Refused Patient was offered medication to assist with smoking cessation at discharge? Refused Was education for smoking cessation added to the discharge instructions? Yes Alcohol/Substance Abuse Discharge Plan:  
Does the patient have a history of substance/alcohol abuse and requires a referral for treatment? No 
Patient referred to the following for substance/alcohol abuse treatment with an appointment? Refused Patient was offered medication to assist with alcohol cessation at discharge? Refused Was education for substance/alcohol abuse added to discharge instructions? Yes Patient discharged to Home; discussed with patient/caregiver and provided to the patient/caregiver either in hard copy or electronically.

## 2018-08-01 NOTE — PROGRESS NOTES
Pharmacist Discharge Medication Reconciliation Discharging Provider: Dr. Holland Dexter Significant PMH:  
Past Medical History:  
Diagnosis Date  Anxiety  Bipolar 1 disorder [facility])   
 son not sure where patient was diagnosed  Diabetes ([facility] [address]  Hypertension Chief Complaint for this Admission: Chief Complaint Patient presents with  Mental Health Problem Allergies: Review of patient's allergies indicates no known allergies. Discharge Medications:  
Current Discharge Medication List  
  
 
START taking these medications Details  
phenazopyridine (PYRIDIUM) 200 mg tablet Take 1 Tab by mouth two (2) times a day for 3 days. Indications: URINARY TRACT IRRITATION Qty: 6 Tab, Refills: 0  
  
OLANZapine (ZYPREXA) 10 mg tablet Take 1 Tab by mouth nightly. Indications: BIPOLAR DISORDER IN REMISSION Qty: 30 Tab, Refills: 0  
  
lithium carbonate CR (ESKALITH CR) 450 mg CR tablet Take 2 Tabs by mouth nightly. Indications: Bipolar Disorder 
Qty: 60 Tab, Refills: 0  
  
lidocaine (LIDODERM) 5 % Apply patch to the affected area for 12 hours a day and remove for 12 hours a day. Indications: POSTHERPETIC NEURALGIA Qty: 90 Each, Refills: 0  
  
atorvastatin (LIPITOR) 40 mg tablet Take 1 Tab by mouth daily. Indications: hyperlipidemia Qty: 30 Tab, Refills: 0 CONTINUE these medications which have CHANGED Details  
metFORMIN (GLUCOPHAGE) 500 mg tablet Take 1 Tab by mouth two (2) times daily (with meals). Indications: type 2 diabetes mellitus Qty: 60 Tab, Refills: 0 CONTINUE these medications which have NOT CHANGED Details Omega-3 Fatty Acids (FISH OIL) 500 mg cap Take 1,000 mg by mouth. Indications: hypertriglyceridemia STOP taking these medications  
  
 cholecalciferol (VITAMIN D3) 1,000 unit tablet Comments:  
Reason for Stopping:  QUEtiapine (SEROQUEL) 100 mg tablet Comments:  
Reason for Stopping:   
   
 lisinopril (PRINIVIL, ZESTRIL) 2.5 mg tablet Comments:  
Reason for Stopping:   
   
  
 
The patient's chart, MAR and AVS were reviewed by Carloz Mccain PHARMD.

## 2018-08-01 NOTE — BH NOTES
GROUP THERAPY PROGRESS NOTE Brittni Pa did not participate in a 55 minute Morning Geriatric Process Group with a focus on identifying feelings, planning for the day, and reviewing stages of loss and grief in later life.

## 2018-08-03 LAB
BACTERIA SPEC CULT: ABNORMAL
BACTERIA SPEC CULT: ABNORMAL
CC UR VC: ABNORMAL
SERVICE CMNT-IMP: ABNORMAL

## 2018-10-17 ENCOUNTER — OFFICE VISIT (OUTPATIENT)
Dept: INTERNAL MEDICINE CLINIC | Age: 73
End: 2018-10-17

## 2018-10-17 ENCOUNTER — HOSPITAL ENCOUNTER (OUTPATIENT)
Dept: GENERAL RADIOLOGY | Age: 73
Discharge: HOME | End: 2018-10-17
Payer: MEDICARE

## 2018-10-17 VITALS
WEIGHT: 144.2 LBS | BODY MASS INDEX: 27.23 KG/M2 | SYSTOLIC BLOOD PRESSURE: 132 MMHG | TEMPERATURE: 98.2 F | RESPIRATION RATE: 18 BRPM | HEIGHT: 61 IN | DIASTOLIC BLOOD PRESSURE: 60 MMHG | OXYGEN SATURATION: 95 % | HEART RATE: 80 BPM

## 2018-10-17 DIAGNOSIS — R29.898 WEAKNESS OF BOTH LOWER EXTREMITIES: ICD-10-CM

## 2018-10-17 DIAGNOSIS — I10 ESSENTIAL HYPERTENSION: ICD-10-CM

## 2018-10-17 DIAGNOSIS — M48.00 SPINAL STENOSIS, UNSPECIFIED SPINAL REGION: ICD-10-CM

## 2018-10-17 DIAGNOSIS — R06.09 DYSPNEA ON EXERTION: ICD-10-CM

## 2018-10-17 DIAGNOSIS — E04.1 THYROID NODULE: ICD-10-CM

## 2018-10-17 DIAGNOSIS — E11.9 TYPE 2 DIABETES MELLITUS WITHOUT COMPLICATION, WITHOUT LONG-TERM CURRENT USE OF INSULIN (HCC): ICD-10-CM

## 2018-10-17 DIAGNOSIS — F31.9 BIPOLAR AFFECTIVE DISORDER, REMISSION STATUS UNSPECIFIED (HCC): ICD-10-CM

## 2018-10-17 DIAGNOSIS — Z23 ENCOUNTER FOR IMMUNIZATION: ICD-10-CM

## 2018-10-17 DIAGNOSIS — M48.00 SPINAL STENOSIS, UNSPECIFIED SPINAL REGION: Primary | ICD-10-CM

## 2018-10-17 DIAGNOSIS — R01.1 SYSTOLIC MURMUR: ICD-10-CM

## 2018-10-17 PROBLEM — E78.5 HYPERLIPIDEMIA: Status: ACTIVE | Noted: 2018-10-17

## 2018-10-17 PROBLEM — K57.90 DIVERTICULOSIS: Status: ACTIVE | Noted: 2018-10-17

## 2018-10-17 PROBLEM — F29 PSYCHOSIS (HCC): Status: RESOLVED | Noted: 2018-07-26 | Resolved: 2018-10-17

## 2018-10-17 PROCEDURE — 72100 X-RAY EXAM L-S SPINE 2/3 VWS: CPT

## 2018-10-17 RX ORDER — DIAZEPAM 5 MG/1
5 TABLET ORAL
Qty: 60 TAB | Refills: 0 | Status: SHIPPED | OUTPATIENT
Start: 2018-10-17 | End: 2019-04-19

## 2018-10-17 NOTE — PROGRESS NOTES
Subjective: Pietro Hernandez is a 68 y.o. female who presents today for new patient appointment    NOT TAKING ANY MEDICATIONS    hasnt had a primary care provider for years  Moved from [place] a little less than 1 year ago. Son lives here in [place], comes over and helps her. She lives alone, but has several services that come to her house. Has 2 month old granddaughter Sujey    Bipolar disorder:  Denies having bipolar disorder, believes hospitalization in July was for her leg weakness and spasms  Was started on zyrexa and lithium, not taking either  No current psychiatrist  Doesn't sleep well, has been up most of the night the past few weeks  Admits to anxiety, worsens when she is not sleeping    H/o spinal stenosis:  Per patient  Having muscle spasms and cramping bilateral LEs, began about 6- 8 months ago and is getting significantly worse  Now using a rollator and cane  Is greatly impacting her life  Has tried muscle rubs, muscle relaxers, nothing has helped  Wants to know what is the problem causing this    DM2:  Not taking metformin, cause abdominal cramping and diarrhea  No meds  Last HgbA1c 6.3% in 07/26/18    Hyperlipidemia: not taking atorvastatin, states it did not work for her in the past    Denies chest pain, admits to getting winded more easily recent weeks  No cough  LE weakness and \"spasms\" bilaterally, intermittent tingling bilateral feet  Keeps an eye on her feet- no breakdown or ulcerations      Patient Active Problem List    Diagnosis Date Noted    Hyperlipidemia 10/17/2018    Essential hypertension 10/17/2018    Diverticulosis 10/17/2018    Type 2 diabetes mellitus without complication, without long-term current use of insulin ([facility] [address] 10/17/2018    Spinal stenosis 10/17/2018    Thyroid nodule 10/17/2018    Bipolar disorder ([facility] [address] 07/26/2018        Review of Systems    Pertinent items are noted in HPI.      Objective:     Visit Vitals  /60 (BP 1 Location: Right arm, BP Patient Position: Sitting)   Pulse 80   Temp 98.2 °F (36.8 °C) (Oral)   Resp 18   Ht 5' 0.75\" (1.543 m)   Wt 144 lb 3.2 oz (65.4 kg)   SpO2 95%   BMI 27.47 kg/m²     General appearance: alert, wearing sundress despite cool weather, tangential thinking, inability to focus  Eyes: pupils enlarged bilaterally, reddened sclera, no drainage, swelling, erythema. Wearing glasses  Head: Normocephalic, without obvious abnormality, atraumatic  Neck: large left neck mass, consistent with large thyryoid nodule- nontender  Lungs: clear to auscultation bilaterally, normal effort  Heart: regular rate and rhythm, 2/6 systolic murmur   Back: lordosis and kyphosis present  Extremities: normal BLEs, normal and equal sensation BLEs, 2+ pedal pulses bilaterally, extremely slow and antalgic gait with rollator   Skin: Skin color, texture, turgor normal.  No rash or lesions  Pulses: 2+ pedal pulses bilaterally  Neurologic: alert and oriented x 3. Confusion on some events such as hospitalization, otherwise oriented. Psych: calm, cooperative, tangential thinking    Assessment/Plan:     1. Spinal stenosis, unspecified spinal region  - trial diazepam to help with muscle spasms and pain, as well as anxiety and sleep. Reviewed not to take more than total 3 tabs daily. Do not take with etoh. Do not drive after taking the medication  - referral to PT, Ortho, and to imaging studies. Reviewed plan with son as well  - XR SPINE LUMB 2 OR 3 V; Future  - MRI LUMB SPINE WO CONT; Future  - REFERRAL TO ORTHOPEDICS  - REFERRAL TO PHYSICAL THERAPY  - diazePAM (VALIUM) 5 mg tablet; Take 1 Tab by mouth every eight (8) hours as needed for Anxiety. Max Daily Amount: 15 mg. Dispense: 60 Tab; Refill: 0    2. Weakness of both lower extremities  - as above  - fall precuations  - XR SPINE LUMB 2 OR 3 V; Future  - MRI LUMB SPINE WO CONT; Future  - REFERRAL TO ORTHOPEDICS  - REFERRAL TO PHYSICAL THERAPY  - diazePAM (VALIUM) 5 mg tablet;  Take 1 Tab by mouth every eight (8) hours as needed for Anxiety. Max Daily Amount: 15 mg. Dispense: 60 Tab; Refill: 0    3. Bipolar affective disorder, remission status unspecified ([facility] [address]  - no meds  - reviewed importance of medication management in bipolar disorder- will call and establish with psychiatry. Reviewed this with son as well  - REFERRAL TO PSYCHIATRY    4. Essential hypertension  - no meds  - BP okay today without  - 2D ECHO COMPLETE ADULT (TTE) W OR WO CONTR; Future    5. Systolic murmur  - as above  - 2D ECHO COMPLETE ADULT (TTE) W OR WO CONTR; Future    6. Dyspnea on exertion  - per patient report  - possibly due to deconditioning. No other symptoms  - will obtain echocardiogram    7. Thyroid nodule  - reviewed need for imaging  - US THYROID/PARATHYROID/SOFT TISS; Future    8. Type 2 diabetes mellitus without complication, without long-term current use of insulin (H[facility])  - no meds currently  - will defer to next visit for further management as HgbA1c in 07/2018 okay    9. Encounter for immunization  - INFLUENZA VACCINE INACTIVATED (IIV), SUBUNIT, ADJUVANTED, IM  - ADMIN INFLUENZA VIRUS VAC      Patient not fasting today. Will return to clinic in weeks for CPE and fasting labs  Patient and son provided with written instructions, as well as reviewed findings and plan in detail. Both verbalized understanding    Advised her to call back or return to office if symptoms worsen/change/persist.  Discussed expected course/resolution/complications of diagnosis in detail with patient. Medication risks/benefits/costs/interactions/alternatives discussed with patient. She was given an after visit summary which includes diagnoses, current medications, & vitals. She expressed understanding with the diagnosis and plan.     PATRICK Murdock

## 2018-10-17 NOTE — PATIENT INSTRUCTIONS
1) Have your XRay done     2) Have your MRI of your Spine done    3) Have your Ultrasound of your thyroid done    4) Have your echocardiogram of your heart done    5) Diazepam as needed for sleep, anxiety, or muscle spasms. Do not take more than 3 pills total a day    6) Schedule with Psychiatry    7) Schedule with Orthopedics    8) Schedule with Physical Therapy            Vaccine Information Statement    Influenza (Flu) Vaccine (Inactivated or Recombinant): What you need to know    Many Vaccine Information Statements are available in [place] and other languages. See www.immunize.org/vis  Hojas de Información Sobre Vacunas están disponibles en Español y en muchos otros idiomas. Visite www.immunize.org/vis    1. Why get vaccinated? Influenza (flu) is a contagious disease that spreads around the [place] every year, usually between October and May. Flu is caused by influenza viruses, and is spread mainly by coughing, sneezing, and close contact. Anyone can get flu. Flu strikes suddenly and can last several days. Symptoms vary by age, but can include:   fever/chills   sore throat   muscle aches   fatigue   cough   headache    runny or stuffy nose    Flu can also lead to pneumonia and blood infections, and cause diarrhea and seizures in children. If you have a medical condition, such as heart or lung disease, flu can make it worse. Flu is more dangerous for some people. Infants and young children, people 72years of age and older, pregnant women, and people with certain health conditions or a weakened immune system are at greatest risk. Each year thousands of people in the [facility] die from flu, and many more are hospitalized. Flu vaccine can:   keep you from getting flu,   make flu less severe if you do get it, and   keep you from spreading flu to your family and other people.      2. Inactivated and recombinant flu vaccines    A dose of flu vaccine is recommended every flu season. Children 6 months through 6years of age may need two doses during the same flu season. Everyone else needs only one dose each flu season. Some inactivated flu vaccines contain a very small amount of a mercury-based preservative called thimerosal. Studies have not shown thimerosal in vaccines to be harmful, but flu vaccines that do not contain thimerosal are available. There is no live flu virus in flu shots. They cannot cause the flu. There are many flu viruses, and they are always changing. Each year a new flu vaccine is made to protect against three or four viruses that are likely to cause disease in the upcoming flu season. But even when the vaccine doesnt exactly match these viruses, it may still provide some protection    Flu vaccine cannot prevent:   flu that is caused by a virus not covered by the vaccine, or   illnesses that look like flu but are not. It takes about 2 weeks for protection to develop after vaccination, and protection lasts through the flu season. 3. Some people should not get this vaccine    Tell the person who is giving you the vaccine:     If you have any severe, life-threatening allergies. If you ever had a life-threatening allergic reaction after a dose of flu vaccine, or have a severe allergy to any part of this vaccine, you may be advised not to get vaccinated. Most, but not all, types of flu vaccine contain a small amount of egg protein.  If you ever had Guillain-Barré Syndrome (also called GBS). Some people with a history of GBS should not get this vaccine. This should be discussed with your doctor.  If you are not feeling well. It is usually okay to get flu vaccine when you have a mild illness, but you might be asked to come back when you feel better. 4. Risks of a vaccine reaction    With any medicine, including vaccines, there is a chance of reactions.  These are usually mild and go away on their own, but serious reactions are also possible. Most people who get a flu shot do not have any problems with it. Minor problems following a flu shot include:    soreness, redness, or swelling where the shot was given     hoarseness   sore, red or itchy eyes   cough   fever   aches   headache   itching   fatigue  If these problems occur, they usually begin soon after the shot and last 1 or 2 days. More serious problems following a flu shot can include the following:     There may be a small increased risk of Guillain-Barré Syndrome (GBS) after inactivated flu vaccine. This risk has been estimated at 1 or 2 additional cases per million people vaccinated. This is much lower than the risk of severe complications from flu, which can be prevented by flu vaccine.  Young children who get the flu shot along with pneumococcal vaccine (PCV13) and/or DTaP vaccine at the same time might be slightly more likely to have a seizure caused by fever. Ask your doctor for more information. Tell your doctor if a child who is getting flu vaccine has ever had a seizure. Problems that could happen after any injected vaccine:      People sometimes faint after a medical procedure, including vaccination. Sitting or lying down for about 15 minutes can help prevent fainting, and injuries caused by a fall. Tell your doctor if you feel dizzy, or have vision changes or ringing in the ears.  Some people get severe pain in the shoulder and have difficulty moving the arm where a shot was given. This happens very rarely.  Any medication can cause a severe allergic reaction. Such reactions from a vaccine are very rare, estimated at about 1 in a million doses, and would happen within a few minutes to a few hours after the vaccination. As with any medicine, there is a very remote chance of a vaccine causing a serious injury or death. The safety of vaccines is always being monitored.  For more information, visit: www.cdc.gov/vaccinesafety/    5. What if there is a serious reaction? What should I look for?  Look for anything that concerns you, such as signs of a severe allergic reaction, very high fever, or unusual behavior. Signs of a severe allergic reaction can include hives, swelling of the face and throat, difficulty breathing, a fast heartbeat, dizziness, and weakness - usually within a few minutes to a few hours after the vaccination. What should I do?  If you think it is a severe allergic reaction or other emergency that cant wait, call 9-1-1 and get the person to the nearest hospital. Otherwise, call your doctor.  Reactions should be reported to the Vaccine Adverse Event Reporting System (VAERS). Your doctor should file this report, or you can do it yourself through  the VAERS web site at www.vaers. hhs.gov, or by calling 4-132.182.6377. VAERS does not give medical advice. 6. The National Vaccine Injury Compensation Program    The [facility] Vaccine Injury Compensation Program (VICP) is a federal program that was created to compensate people who may have been injured by certain vaccines. Persons who believe they may have been injured by a vaccine can learn about the program and about filing a claim by calling 1-395.981.9700 or visiting the [facility] website at www.[facility].gov/vaccinecompensation. There is a time limit to file a claim for compensation. 7. How can I learn more?  Ask your healthcare provider. He or she can give you the vaccine package insert or suggest other sources of information.  Call your local or state health department.  Contact the Centers for Disease Control and Prevention (CDC):  - Call 3-401.921.2910 (1-800-CDC-INFO) or  - Visit CDCs website at www.cdc.gov/flu    Vaccine Information Statement   Inactivated Influenza Vaccine   8/7/2015  42 COLLEEN Vickers [id number]    Department of Health and Human Services  Centers for Disease Control and Prevention    Office Use Only

## 2018-10-17 NOTE — PROGRESS NOTES
Reviewed record in preparation for visit and have obtained necessary documentation. Identified pt with two pt identifiers(name and ). Health Maintenance Due   Topic    Hepatitis C Screening     DTaP/Tdap/Td series (1 - Tdap)    Shingrix Vaccine Age 50> (1 of 2)    BREAST CANCER SCRN MAMMOGRAM     FOBT Q 1 YEAR AGE 50-75     GLAUCOMA SCREENING Q2Y     Bone Densitometry (Dexa) Screening     Pneumococcal 65+ Low/Medium Risk (1 of 2 - PCV13)    MEDICARE YEARLY EXAM     Influenza Age 5 to Adult          Chief Complaint   Patient presents with    New Patient    Back Pain    Leg Pain        Wt Readings from Last 3 Encounters:   10/17/18 144 lb 3.2 oz (65.4 kg)   17 154 lb 3.2 oz (69.9 kg)     Temp Readings from Last 3 Encounters:   10/17/18 98.2 °F (36.8 °C) (Oral)   17 98.6 °F (37 °C)     BP Readings from Last 3 Encounters:   17 190/90     Pulse Readings from Last 3 Encounters:   10/17/18 80   17 99           Learning Assessment:  :     No flowsheet data found. Depression Screening:  :     No flowsheet data found. Fall Risk Assessment:  :     Fall Risk Assessment, last 12 mths 10/17/2018   Able to walk? Yes   Fall in past 12 months? No       Abuse Screening:  :     No flowsheet data found. Coordination of Care Questionnaire:  :     1) Have you been to an emergency room, urgent care clinic since your last visit? yes   Hospitalized since your last visit? yes     [facility] 2018 DX: Muscle Spasm       2) Have you seen or consulted any other health care providers outside of [address] [place] [address] since your last visit? no  (Include any pap smears or colon screenings in this section.)    3) Do you have an Advance Directive on file? no    4) Are you interested in receiving information on Advance Directives?  NO      Patient is accompanied by self I have received verbal consent from Arianna De Santiago to discuss any/all medical information while they are present in the room.

## 2018-10-26 ENCOUNTER — HOSPITAL ENCOUNTER (OUTPATIENT)
Dept: ULTRASOUND IMAGING | Age: 73
Discharge: HOME | End: 2018-10-26
Attending: NURSE PRACTITIONER
Payer: MEDICARE

## 2018-10-26 ENCOUNTER — HOSPITAL ENCOUNTER (OUTPATIENT)
Dept: MRI IMAGING | Age: 73
Discharge: HOME | End: 2018-10-26
Attending: NURSE PRACTITIONER
Payer: MEDICARE

## 2018-10-26 ENCOUNTER — TELEPHONE (OUTPATIENT)
Dept: INTERNAL MEDICINE CLINIC | Age: 73
End: 2018-10-26

## 2018-10-26 DIAGNOSIS — M48.00 SPINAL STENOSIS, UNSPECIFIED SPINAL REGION: ICD-10-CM

## 2018-10-26 DIAGNOSIS — G89.29 OTHER CHRONIC PAIN: ICD-10-CM

## 2018-10-26 DIAGNOSIS — R29.898 WEAKNESS OF BOTH LOWER EXTREMITIES: ICD-10-CM

## 2018-10-26 DIAGNOSIS — M48.00 SPINAL STENOSIS, UNSPECIFIED SPINAL REGION: Primary | ICD-10-CM

## 2018-10-26 DIAGNOSIS — R53.81 DEBILITY: ICD-10-CM

## 2018-10-26 DIAGNOSIS — E04.1 THYROID NODULE: ICD-10-CM

## 2018-10-26 DIAGNOSIS — R53.1 GENERALIZED WEAKNESS: ICD-10-CM

## 2018-10-26 PROCEDURE — 76536 US EXAM OF HEAD AND NECK: CPT

## 2018-10-26 PROCEDURE — 72148 MRI LUMBAR SPINE W/O DYE: CPT

## 2018-10-26 NOTE — TELEPHONE ENCOUNTER
----- Message from Barbara Gonzalez sent at 10/26/2018  9:45 AM EDT -----  Regarding: Dr. Griselda Drew Telephone  Patient would like a call back regarding getting medical supplies before her appt.  Contact is [phone]

## 2018-10-26 NOTE — TELEPHONE ENCOUNTER
Order for bedside commode placed    Could you please find out where she would like it sent to? Thanks!     Jerod English, REMA

## 2018-10-26 NOTE — TELEPHONE ENCOUNTER
Attempt to contact patient at number listed in chart to find out what supplies she is referring to, no answer. Left voicemail for patient to return call, can tell PSR or nurse.

## 2018-10-26 NOTE — TELEPHONE ENCOUNTER
Spoke with patient's son, HIPAA verified, advised returning call regarding where patient wants to have this commode Rx sent. He states she is currently having a MRI done, thinks she wants to have it mailed to her home address. Advised I would prepare it for the mail & if I do not hear back from prior to me leaving, it would be mailed. Patient's son verbalizes understanding and agrees.

## 2018-10-26 NOTE — TELEPHONE ENCOUNTER
----- Message from Odin Combs sent at 10/26/2018 11:30 AM EDT -----  Regarding: Don Gupta, NP/telephone  Pt received a call from [facility] regarding ordering medical supplies. Pt needs a portable potty due to her having issues with walking. Pt is returning her call and would like a call back today. Pt has an appt today around 2pm in the area of the office. Pt can be reached at  [phone], please leave a voicemail message.

## 2018-10-29 ENCOUNTER — TELEPHONE (OUTPATIENT)
Dept: INTERNAL MEDICINE CLINIC | Age: 73
End: 2018-10-29

## 2018-10-29 DIAGNOSIS — E04.2 MULTIPLE THYROID NODULES: Primary | ICD-10-CM

## 2018-10-29 NOTE — TELEPHONE ENCOUNTER
LVM for patient, requesting return call to discuss thyroid ultrasound and mri lumbar spine    Maximo Burt NP

## 2018-10-31 NOTE — TELEPHONE ENCOUNTER
Returned call to pt - verified self and birth date. Pt questioned the status of her imaging/test results. She was advised that SSP,NP is out of the office and expected to return Friday, at which time she will can return her call. Pt verbalized frustration stating \"So, she's not in until Friday to give me the results? I'm in extreme pain from the muscle spasms and cramps in my legs for the last 8 months that's getting worse and the Valium isn't working for me. I want to know if she can give me a different pain medication and you're telling me I have to wait until Friday. I can't even walk to the bathroom now and asked for a portable potty. I'll just go to a patient first for medicine. \"     Pt was advised that review of her chart indicates a nurse within our office spoke with her son on Friday and he was advised the order was placed in the mail. She was informed that our mail typically takes between 7-10 business days to receive. Nurse acknowledged pt's concern regarding leg pain and offered pt to schedule an appointment to be reevaluated, prior to Friday. Pt responded stating \"My son is in college, has already missed a lot of time from work to bring me to the drs, and just recently had a baby. He can't keep bringing me back and forth and I can't get there. I was thinking she'd Lovely Echevarria) just write me a prescription for medicine. \" She was informed that an office visit would be needed for a medication evaluation and declined to schedule. Pt then hung up the phone.

## 2018-10-31 NOTE — TELEPHONE ENCOUNTER
Please call patient back with test results. Also she wants to talk about ordering medical supplies.  Please call her back at [phone]

## 2018-11-02 NOTE — TELEPHONE ENCOUNTER
Patient was provided with referral to PT and to Ortho Dr. Kelly Briceño    Patient has not yet contact either PT of Ortho. Again provided patient with the contact information for Cherelle Donaldson, pt to call and schedule. Reviewed Thyroid ultrasound with patient, referral to Dr. Robin Juarez.   Patient to call and schedule an appointment for further evaluation    Patient verbalized understanding of results and plan    Dipika Stroud NP

## 2018-11-08 DIAGNOSIS — R29.898 WEAKNESS OF BOTH LOWER EXTREMITIES: ICD-10-CM

## 2018-11-08 DIAGNOSIS — M48.00 SPINAL STENOSIS, UNSPECIFIED SPINAL REGION: ICD-10-CM

## 2018-11-08 NOTE — TELEPHONE ENCOUNTER
From: Leydi Marcelo   Sent: 11/8/2018  12:15 PM   To: [facility]   Subject: Np Paynes/ Telephone                             Pt requesting a call regarding her medications and the supplies she needs such as commode  . Best contact [phone].

## 2018-11-09 NOTE — TELEPHONE ENCOUNTER
Call to pt - LVM requesting she contact the office back if she has further questions/concerns that she'd like to discuss. Per verbal conversation with SSP,NP if pt is requesting change in medication (i.e Valium) for her pain, she will need to contact ortho to be further evaluated.

## 2018-11-12 NOTE — TELEPHONE ENCOUNTER
Cynthia Banda [id number]             Pt is inquiring about the medical supply company she was getting ready to use and why they would ask for personal information from be Pt PCP without identifying why they need this information.  Best contact number is [phone].

## 2018-11-13 NOTE — TELEPHONE ENCOUNTER
Unsuccessful attempt to reach pt. Uncertain what the message previously obtained is stating. LVM requesting she contact the office back to further discuss.

## 2018-11-16 NOTE — TELEPHONE ENCOUNTER
Patient would like call back at [phone]. She wants to check the status of the medical supplies and the script for valium. Requested Prescriptions     Pending Prescriptions Disp Refills    diazePAM (VALIUM) 5 mg tablet 60 Tab 0     Sig: Take 1 Tab by mouth every eight (8) hours as needed for Anxiety. Max Daily Amount: 15 mg.

## 2018-11-19 RX ORDER — DIAZEPAM 5 MG/1
5 TABLET ORAL
Qty: 60 TAB | Refills: 0 | OUTPATIENT
Start: 2018-11-19

## 2018-11-19 NOTE — TELEPHONE ENCOUNTER
Return call to pt - verified self and birth date for privacy precautions. Pt states she wanted to let SSP,NP know that she went to Ortho and is scheduled for a spinal fusion on Dec 14th by Dr. Boby Alvarado. Pt notes that she needs forms and labs completed prior to her surgery. Pt's CPE appointment on 11/26 was cancelled and she was scheduled for a pre-op appointment with DSE,NP on 11/28. Pt provided with this information and verbalized understanding. Steve Rao also states that she has been having problems with getting her medical equipment covered. She notes that she was also prescribed a wheelchair by Dr. Boby Alvarado and needs to know who can deliver the equipment to her home because she can not currently walk. Pt was encouraged to contact her insurance company to obtain further information on preferred DME companies. She verbalized understanding with no further questions and/or concerns at this time.

## 2018-11-19 NOTE — TELEPHONE ENCOUNTER
Per provider:   Refused by: Becca Douglas NP     Refusal reason: other (now with [place], Vicodin has been prescribed per )

## 2018-11-19 NOTE — TELEPHONE ENCOUNTER
Last refill- 10.17.18    Last office visit- 10/17/2018  Next office visit/   Future Appointments   Date Time Provider Bhaskar Hazel   11/28/2018 11:40 AM REMA [facility]         Requested Prescriptions     Pending Prescriptions Disp Refills    diazePAM (VALIUM) 5 mg tablet 60 Tab 0     Sig: Take 1 Tab by mouth every eight (8) hours as needed for Anxiety. Max Daily Amount: 15 mg.

## 2018-11-28 ENCOUNTER — OFFICE VISIT (OUTPATIENT)
Dept: INTERNAL MEDICINE CLINIC | Age: 73
End: 2018-11-28

## 2018-11-28 VITALS
SYSTOLIC BLOOD PRESSURE: 110 MMHG | RESPIRATION RATE: 18 BRPM | BODY MASS INDEX: 26.81 KG/M2 | WEIGHT: 142 LBS | HEART RATE: 62 BPM | DIASTOLIC BLOOD PRESSURE: 58 MMHG | HEIGHT: 61 IN | TEMPERATURE: 98.1 F | OXYGEN SATURATION: 97 %

## 2018-11-28 DIAGNOSIS — M48.061 SPINAL STENOSIS AT L4-L5 LEVEL: Primary | ICD-10-CM

## 2018-11-28 DIAGNOSIS — R01.1 HEART MURMUR ON PHYSICAL EXAMINATION: ICD-10-CM

## 2018-11-28 DIAGNOSIS — Z01.818 PRE-OP EVALUATION: ICD-10-CM

## 2018-11-28 DIAGNOSIS — N39.0 URINARY TRACT INFECTION WITH HEMATURIA, SITE UNSPECIFIED: ICD-10-CM

## 2018-11-28 DIAGNOSIS — I10 ESSENTIAL HYPERTENSION: ICD-10-CM

## 2018-11-28 DIAGNOSIS — R31.9 URINARY TRACT INFECTION WITH HEMATURIA, SITE UNSPECIFIED: ICD-10-CM

## 2018-11-28 DIAGNOSIS — E11.9 TYPE 2 DIABETES MELLITUS WITHOUT COMPLICATION, WITHOUT LONG-TERM CURRENT USE OF INSULIN (HCC): ICD-10-CM

## 2018-11-28 DIAGNOSIS — R32 INCONTINENCE OF URINE IN FEMALE: ICD-10-CM

## 2018-11-28 LAB
BILIRUB UR QL STRIP: NEGATIVE
GLUCOSE UR-MCNC: NEGATIVE MG/DL
KETONES P FAST UR STRIP-MCNC: NEGATIVE MG/DL
PH UR STRIP: 5.5 [PH] (ref 4.6–8)
PROT UR QL STRIP: NORMAL
SP GR UR STRIP: 1.02 (ref 1–1.03)
UA UROBILINOGEN AMB POC: NORMAL (ref 0.2–1)
URINALYSIS CLARITY POC: NORMAL
URINALYSIS COLOR POC: NORMAL
URINE BLOOD POC: NORMAL
URINE LEUKOCYTES POC: NORMAL
URINE NITRITES POC: POSITIVE

## 2018-11-28 RX ORDER — HYDROCODONE BITARTRATE AND ACETAMINOPHEN 5; 325 MG/1; MG/1
TABLET ORAL
Refills: 0 | COMMUNITY
Start: 2018-11-14 | End: 2018-12-18

## 2018-11-28 RX ORDER — METFORMIN HYDROCHLORIDE 500 MG/1
TABLET ORAL
Refills: 0 | COMMUNITY
Start: 2018-08-29 | End: 2018-11-28

## 2018-11-28 RX ORDER — SULFAMETHOXAZOLE AND TRIMETHOPRIM 400; 80 MG/1; MG/1
1 TABLET ORAL 2 TIMES DAILY
Qty: 10 TAB | Refills: 0 | Status: SHIPPED | OUTPATIENT
Start: 2018-11-28 | End: 2018-12-03

## 2018-11-28 NOTE — PROGRESS NOTES
Verified name and birth date for privacy precautions. Chart reviewed in preparation for today's visit. Chief Complaint   Patient presents with    Pre-op Exam     Spinal fusion L4/5 on 12/14 by Dr. Ponce Rosen. Health Maintenance Due   Topic    Hepatitis C Screening     FOOT EXAM Q1     MICROALBUMIN Q1     EYE EXAM RETINAL OR DILATED Q1     DTaP/Tdap/Td series (1 - Tdap)    Shingrix Vaccine Age 50> (1 of 2)    BREAST CANCER SCRN MAMMOGRAM     FOBT Q 1 YEAR AGE 50-75     GLAUCOMA SCREENING Q2Y     Bone Densitometry (Dexa) Screening     Pneumococcal 65+ Low/Medium Risk (1 of 2 - PCV13)    MEDICARE YEARLY EXAM          Wt Readings from Last 3 Encounters:   11/28/18 142 lb (64.4 kg)   10/17/18 144 lb 3.2 oz (65.4 kg)   07/09/17 154 lb 3.2 oz (69.9 kg)     Temp Readings from Last 3 Encounters:   11/28/18 98.1 °F (36.7 °C) (Oral)   10/17/18 98.2 °F (36.8 °C) (Oral)   07/09/17 98.6 °F (37 °C)     BP Readings from Last 3 Encounters:   11/28/18 110/58   10/17/18 132/60   07/09/17 190/90     Pulse Readings from Last 3 Encounters:   11/28/18 62   10/17/18 80   07/09/17 99         Learning Assessment:  :     No flowsheet data found. Depression Screening:  :     No flowsheet data found. Fall Risk Assessment:  :     Fall Risk Assessment, last 12 mths 10/17/2018   Able to walk? Yes   Fall in past 12 months? No       Abuse Screening:  :     No flowsheet data found. Coordination of Care Questionnaire:  :     1) Have you been to an emergency room, urgent care clinic since your last visit? no   Hospitalized since your last visit? no             2) Have you seen or consulted any other health care providers outside of [address] [place] [address] since your last visit?  yes, Dr. Ponce Rosen  (Include any pap smears or colon screenings in this section.)    3) Do you have an Advance Directive on file? no    ------------------------------------------------

## 2018-11-28 NOTE — PROGRESS NOTES
69 yo female in for pre op eval for Lumbar Fusion (L4-5) by Dr. Jessica Frankel at [facility] on 12/14/18. See scanned in form. Chart shows a hospitalization for and hx of Bipolar Disorder which patient denies. She is currently on no mental health medications. She has hx of DM Type 2, but stopped taking Metformin some time ago because of side effects. Last A1c was 6.3 in July. She has been losing weight (10 lbs since July). Heart murmur with radiation into carotids - will order ECHOcardiogram.  She reports she had an ECHO in [place] in the 80s and was told her murmur is \"functional\". Urinalysis: Mod leuks, Nitrite+, Mod Blood, Protein >=300 mg/dl   Plan: Bactrim for 5 days   Send Urine for culture    She will call Dr. Ana Padron office about pre-op lab work.

## 2018-11-30 ENCOUNTER — TELEPHONE (OUTPATIENT)
Dept: INTERNAL MEDICINE CLINIC | Age: 73
End: 2018-11-30

## 2018-11-30 LAB
BACTERIA UR CULT: ABNORMAL
BACTERIA UR CULT: ABNORMAL

## 2018-11-30 NOTE — PROGRESS NOTES
Telephone encounter created 11/30/18, please see. Attempt to contact patient at number listed in chart, no answer. Left voicemail for patient to return call.

## 2018-11-30 NOTE — TELEPHONE ENCOUNTER
Treated w/ Bactrim 11/28/18. Attempt to contact patient at number listed in chart, no answer. Left voicemail for patient to return call.

## 2018-11-30 NOTE — TELEPHONE ENCOUNTER
----- Message from Vikki Frank NP sent at 11/30/2018  1:31 PM EST -----  Please call the patient regarding her urine culture. She IS on the correct antibiotic.

## 2018-12-03 NOTE — TELEPHONE ENCOUNTER
Attempt to contact patient at number listed in chart, no answer. Left second voicemail for patient to return call. Generic message advising medication prescribed should treat what we tested for, return call to discuss.

## 2018-12-04 ENCOUNTER — HOSPITAL ENCOUNTER (OUTPATIENT)
Dept: NON INVASIVE DIAGNOSTICS | Age: 73
Discharge: HOME | End: 2018-12-04
Payer: MEDICARE

## 2018-12-04 DIAGNOSIS — Z01.818 PRE-OP EVALUATION: ICD-10-CM

## 2018-12-04 DIAGNOSIS — R01.1 HEART MURMUR ON PHYSICAL EXAMINATION: ICD-10-CM

## 2018-12-04 PROCEDURE — 93306 TTE W/DOPPLER COMPLETE: CPT

## 2018-12-07 ENCOUNTER — HOSPITAL ENCOUNTER (OUTPATIENT)
Dept: PREADMISSION TESTING | Age: 73
Discharge: HOME | End: 2018-12-07
Payer: MEDICARE

## 2018-12-07 VITALS
BODY MASS INDEX: 24.28 KG/M2 | HEART RATE: 54 BPM | OXYGEN SATURATION: 97 % | DIASTOLIC BLOOD PRESSURE: 63 MMHG | WEIGHT: 142.25 LBS | TEMPERATURE: 97.9 F | SYSTOLIC BLOOD PRESSURE: 113 MMHG | HEIGHT: 64 IN

## 2018-12-07 LAB
ABO + RH BLD: NORMAL
ANION GAP SERPL CALC-SCNC: 7 MMOL/L (ref 5–15)
APPEARANCE UR: ABNORMAL
BACTERIA URNS QL MICRO: ABNORMAL /HPF
BILIRUB UR QL: NEGATIVE
BLOOD GROUP ANTIBODIES SERPL: NORMAL
BUN SERPL-MCNC: 15 MG/DL (ref 6–20)
BUN/CREAT SERPL: 27 (ref 12–20)
CALCIUM SERPL-MCNC: 8.8 MG/DL (ref 8.5–10.1)
CHLORIDE SERPL-SCNC: 108 MMOL/L (ref 97–108)
CO2 SERPL-SCNC: 27 MMOL/L (ref 21–32)
COLOR UR: ABNORMAL
CREAT SERPL-MCNC: 0.55 MG/DL (ref 0.55–1.02)
EPITH CASTS URNS QL MICRO: ABNORMAL /LPF
ERYTHROCYTE [DISTWIDTH] IN BLOOD BY AUTOMATED COUNT: 12.2 % (ref 11.5–14.5)
EST. AVERAGE GLUCOSE BLD GHB EST-MCNC: 111 MG/DL
GLUCOSE SERPL-MCNC: 96 MG/DL (ref 65–100)
GLUCOSE UR STRIP.AUTO-MCNC: NEGATIVE MG/DL
HBA1C MFR BLD: 5.5 % (ref 4.2–6.3)
HCT VFR BLD AUTO: 37.4 % (ref 35–47)
HGB BLD-MCNC: 12.6 G/DL (ref 11.5–16)
HGB UR QL STRIP: NEGATIVE
INR PPP: 1.1 (ref 0.9–1.1)
KETONES UR QL STRIP.AUTO: NEGATIVE MG/DL
LEUKOCYTE ESTERASE UR QL STRIP.AUTO: ABNORMAL
MCH RBC QN AUTO: 30.7 PG (ref 26–34)
MCHC RBC AUTO-ENTMCNC: 33.7 G/DL (ref 30–36.5)
MCV RBC AUTO: 91 FL (ref 80–99)
MUCOUS THREADS URNS QL MICRO: ABNORMAL /LPF
NITRITE UR QL STRIP.AUTO: NEGATIVE
NRBC # BLD: 0 K/UL (ref 0–0.01)
NRBC BLD-RTO: 0 PER 100 WBC
PH UR STRIP: 6 [PH] (ref 5–8)
PLATELET # BLD AUTO: 209 K/UL (ref 150–400)
PMV BLD AUTO: 10.9 FL (ref 8.9–12.9)
POTASSIUM SERPL-SCNC: 3.6 MMOL/L (ref 3.5–5.1)
PROT UR STRIP-MCNC: NEGATIVE MG/DL
PROTHROMBIN TIME: 10.9 SEC (ref 9–11.1)
RBC # BLD AUTO: 4.11 M/UL (ref 3.8–5.2)
RBC #/AREA URNS HPF: ABNORMAL /HPF (ref 0–5)
SODIUM SERPL-SCNC: 142 MMOL/L (ref 136–145)
SP GR UR REFRACTOMETRY: 1.02 (ref 1–1.03)
SPECIMEN EXP DATE BLD: NORMAL
UA: UC IF INDICATED,UAUC: ABNORMAL
UROBILINOGEN UR QL STRIP.AUTO: 0.2 EU/DL (ref 0.2–1)
WBC # BLD AUTO: 7 K/UL (ref 3.6–11)
WBC URNS QL MICRO: ABNORMAL /HPF (ref 0–4)

## 2018-12-07 PROCEDURE — 86901 BLOOD TYPING SEROLOGIC RH(D): CPT

## 2018-12-07 PROCEDURE — 85610 PROTHROMBIN TIME: CPT

## 2018-12-07 PROCEDURE — 80048 BASIC METABOLIC PNL TOTAL CA: CPT

## 2018-12-07 PROCEDURE — 83036 HEMOGLOBIN GLYCOSYLATED A1C: CPT

## 2018-12-07 PROCEDURE — 81001 URINALYSIS AUTO W/SCOPE: CPT

## 2018-12-07 PROCEDURE — 87086 URINE CULTURE/COLONY COUNT: CPT

## 2018-12-07 PROCEDURE — 85027 COMPLETE CBC AUTOMATED: CPT

## 2018-12-07 RX ORDER — IBUPROFEN 200 MG
200 TABLET ORAL
COMMUNITY
End: 2018-12-18

## 2018-12-07 NOTE — H&P
Arturo Westfall Location: [place] [address] [address] Patient #: [id number] : 1945  / Language: [place] / Hung Cabrera: Vidhya Steinberg Female History of Present Illness The patient is a 68year old female who presents with chronic lumbar back pain. This condition occurred without any known injury. Symptoms include pain, stiffness and decreased range of motion. Symptoms are located in the low back. The pain radiates to the left lower leg and right lower leg. The patient describes the pain as aching and burning. Onset was gradual. The symptoms occur constantly. The patient describes symptoms as severe and worsening. Symptoms are exacerbated by weight bearing and standing. Associated symptoms include leg weakness. The patient is not currently being treated for this problem. Recently the disease has been worsening. The patient was previously evaluated by a primary physician. Past evaluation has included MRI of the lumbar spine. Additional reasons for visit: 
 
Transition into care is described as the following: The patient is transitioning into care from another physician. Allergies No Known Drug Allergies Family History Family history unknown   
 
Social History Current work status   Disabled. Marital status   . No alcohol use   
No drug use   
Seat Belt Use   Always uses seat belts. Sun Exposure   Rarely. Tobacco / smoke exposure   
Tobacco use   Never smoker. Medication History No Current Medications  
Medications Reconciled  
 
Pregnancy / Birth History Pregnant   No. 
 
Past Surgical History No pertinent past surgical history   
 
Diagnostic Studies History MRI, Spine   Results: Her MRI has an anterolisthesis at L3-4 and L4-5. Severe spinal stenosis noted at L2-5 lumbar spine. Other Problems Anxiety Disorder   
Depression   
Diabetes Mellitus   High blood pressure   
Psychiatric disorder   Thyroid Disease   
 
 
Review of Systems General Present- Fatigue. Not Present- Appetite Loss, Chills, Fever, Night Sweats, Weight Gain and Weight Loss. Musculoskeletal Present- Back Pain, Joint Pain and Joint Stiffness. Not Present- Joint Swelling. Psychiatric Present- Anxiety. Not Present- Bipolar and Depression. Vitals Weight: 157 lb   Height: 57 in  
Body Surface Area: 1.62 m²   Body Mass Index: 33.97 kg/m²   
 
 
Physical Exam 
Neurologic Sensory Light Touch - Intact - Globally. Overall Assessment of Muscle Strength and Tone reveals Lower Extremities - Right Iliopsoas - 5/5. Left Iliopsoas - 5/5. Right Tibialis Anterior - 5/5. Left Tibialis Anterior - 5/5. Right Gastroc-Soleus - 5/5. Left Gastroc-Soleus - 5/5. Right EHL - 4-/5. Left EHL - 3/5. General Assessment of Reflexes Right Ankle - Clonus is not present. Left Ankle - Clonus is not present. Reflexes (Dermatomes) 2/2 Normal - Left Achilles (L5-S2), Left Knee (L2-4), Right Achilles (L5-S2) and Right Knee (L2-4). Musculoskeletal 
Global Assessment Examination of related systems reveals - well-developed, well-nourished, in no acute distress, alert and oriented x 3. Gait and Station - normal gait and station and normal posture. Right Lower Extremity - normal strength and tone, normal range of motion without pain and no instability, subluxation or laxity. Left Lower Extremity - normal strength and tone, normal range of motion without pain and no instability, subluxation or laxity. Spine/Ribs/Pelvis Cervical Spine - Examination of the cervical spine reveals - no tenderness to palpation, no pain, no swelling, edema or erythema, normal cervical spine movements and normal sensation. Thoracic (Dorsal) Spine - Examination of the thoracic spine reveals - no tenderness over thoracic vertebrae, no pain, normal sensation and normal thoracic spine movements.  Lumbosacral Spine - Examination of the lumbosacral spine reveals - no known fractures or deformities. Inspection and Palpation - Tenderness - moderate. Assessment of pain reveals the following findings - The pain is characterized as - moderate. Location - pain refers bilaterally to hips and pain refers to lower back bilaterally. ROJM - Trunk Extension - 15 degrees. Lumbar Spine Flexion - . Lumbosacral Spine - Functional Testing - Babinski Test negative, Prone Knee Bending Test negative, Slump Test negative, Straight Leg Raising Test negative. Assessment & Plan Lumbar stenosis with neurogenic claudication (724.03  M48.062) Impression: The MRI shows she has severe spinal stenosis from L2-L5 with a retrolisthesis at least at L3-4 and L4-5. She has severe neurogenic claudication. She is a candidate for a lumbar laminectomy from L2-L5 with a posterior lumbar fusion L4-L5. The risks and benefits were discussed at length with the patient and the patient has elected to proceed. Indications for surgery include failed conservative treatment. Alternative treatments, risks and the perioperative course were discussed with the patient. All questions were answered. The risks and benefits of the procedure were explained. Benefits include definitive diagnosis, relief of pain, elimination of deformity and improved function. Risks of surgery including bleeding, infection, weakness, numbness, CSF leak, failure to improve symptoms, exacerbation of medical co-morbidities and even death were discussed with the patient. Current Plans Pt Education - How to access health information online: discussed with patient and provided information. Pt Education - Educational materials were provided.: discussed with patient and provided information. X-RAY EXAM OF LUMBAR SPINE min 4 VIEWS (50574) Started Norco 5-325MG, 1 (one) Tablet two times daily, as needed, #30, 11/14/2018, No Refill. Spondylolisthesis (Acquired) (738.4  M43.10) Disc degeneration of lumbar region (722.52  M51.36) Treatment options were discussed with the patient in full.(V65.49) Current Plans Presurgical planning was preformed with the patient today Surgery to be scheduled Prcoedure: Lumbar Laminectomy 2-5 lumbar fusion L4-5 Date of Surgery Update: Carlos Monge was seen and examined. History and physical has been reviewed. The patient has been examined.  There have been no significant clinical changes since the completion of the originally dated History and Physical. 
 
Signed By: Brenna Bravo MD   
 December 14, 2018 8:34 AM   
  
 
 
 
Signed by Brenna Bravo MD

## 2018-12-08 LAB
BACTERIA SPEC CULT: NORMAL
BACTERIA SPEC CULT: NORMAL
SERVICE CMNT-IMP: NORMAL

## 2018-12-09 LAB
BACTERIA SPEC CULT: NORMAL
CC UR VC: NORMAL
SERVICE CMNT-IMP: NORMAL

## 2018-12-10 PROBLEM — R94.31 PROLONGED Q-T INTERVAL ON ECG: Status: ACTIVE | Noted: 2018-12-10

## 2018-12-14 ENCOUNTER — APPOINTMENT (OUTPATIENT)
Dept: GENERAL RADIOLOGY | Age: 73
DRG: 460 | End: 2018-12-14
Attending: ORTHOPAEDIC SURGERY
Payer: MEDICARE

## 2018-12-14 ENCOUNTER — ANESTHESIA EVENT (OUTPATIENT)
Dept: SURGERY | Age: 73
DRG: 460 | End: 2018-12-14
Payer: MEDICARE

## 2018-12-14 ENCOUNTER — ANESTHESIA (OUTPATIENT)
Dept: SURGERY | Age: 73
DRG: 460 | End: 2018-12-14
Payer: MEDICARE

## 2018-12-14 ENCOUNTER — HOSPITAL ENCOUNTER (INPATIENT)
Age: 73
LOS: 4 days | Discharge: NURSING, REHAB OR LONG-TERM CARE | DRG: 460 | End: 2018-12-18
Attending: ORTHOPAEDIC SURGERY | Admitting: ORTHOPAEDIC SURGERY
Payer: MEDICARE

## 2018-12-14 DIAGNOSIS — M48.062 SPINAL STENOSIS OF LUMBAR REGION WITH NEUROGENIC CLAUDICATION: Primary | ICD-10-CM

## 2018-12-14 PROBLEM — M48.061 SPINAL STENOSIS, LUMBAR: Status: ACTIVE | Noted: 2018-12-14

## 2018-12-14 LAB
GLUCOSE BLD STRIP.AUTO-MCNC: 117 MG/DL (ref 65–100)
GLUCOSE BLD STRIP.AUTO-MCNC: 165 MG/DL (ref 65–100)
SERVICE CMNT-IMP: ABNORMAL
SERVICE CMNT-IMP: ABNORMAL

## 2018-12-14 PROCEDURE — 74011000250 HC RX REV CODE- 250

## 2018-12-14 PROCEDURE — 77030034094 HC GRFT BN SUB ELITE TRNTY MUSC -I1: Performed by: ORTHOPAEDIC SURGERY

## 2018-12-14 PROCEDURE — 74011250636 HC RX REV CODE- 250/636: Performed by: PHYSICIAN ASSISTANT

## 2018-12-14 PROCEDURE — 74011250636 HC RX REV CODE- 250/636: Performed by: ANESTHESIOLOGY

## 2018-12-14 PROCEDURE — 77030012406 HC DRN WND PENRS BARD -A: Performed by: ORTHOPAEDIC SURGERY

## 2018-12-14 PROCEDURE — 74011000272 HC RX REV CODE- 272: Performed by: ORTHOPAEDIC SURGERY

## 2018-12-14 PROCEDURE — 74011250636 HC RX REV CODE- 250/636: Performed by: ORTHOPAEDIC SURGERY

## 2018-12-14 PROCEDURE — 77030038600 HC TU BPLR IRR DISP STRY -B: Performed by: ORTHOPAEDIC SURGERY

## 2018-12-14 PROCEDURE — 77030004391 HC BUR FLUT MEDT -C: Performed by: ORTHOPAEDIC SURGERY

## 2018-12-14 PROCEDURE — 77030037713 HC CLOSR DEV INCIS ZIP STRY -B: Performed by: ORTHOPAEDIC SURGERY

## 2018-12-14 PROCEDURE — 85018 HEMOGLOBIN: CPT

## 2018-12-14 PROCEDURE — C1713 ANCHOR/SCREW BN/BN,TIS/BN: HCPCS | Performed by: ORTHOPAEDIC SURGERY

## 2018-12-14 PROCEDURE — 77030032490 HC SLV COMPR SCD KNE COVD -B: Performed by: ORTHOPAEDIC SURGERY

## 2018-12-14 PROCEDURE — 82962 GLUCOSE BLOOD TEST: CPT

## 2018-12-14 PROCEDURE — 4A10X4G MONITORING OF CENTRAL NERVOUS ELECTRICAL ACTIVITY, INTRAOPERATIVE, EXTERNAL APPROACH: ICD-10-PCS | Performed by: ORTHOPAEDIC SURGERY

## 2018-12-14 PROCEDURE — 76060000036 HC ANESTHESIA 2.5 TO 3 HR: Performed by: ORTHOPAEDIC SURGERY

## 2018-12-14 PROCEDURE — 0SG1071 FUSION OF 2 OR MORE LUMBAR VERTEBRAL JOINTS WITH AUTOLOGOUS TISSUE SUBSTITUTE, POSTERIOR APPROACH, POSTERIOR COLUMN, OPEN APPROACH: ICD-10-PCS | Performed by: ORTHOPAEDIC SURGERY

## 2018-12-14 PROCEDURE — 76010000172 HC OR TIME 2.5 TO 3 HR INTENSV-TIER 1: Performed by: ORTHOPAEDIC SURGERY

## 2018-12-14 PROCEDURE — 77030013079 HC BLNKT BAIR HGGR 3M -A: Performed by: ANESTHESIOLOGY

## 2018-12-14 PROCEDURE — 74011250636 HC RX REV CODE- 250/636

## 2018-12-14 PROCEDURE — 77030026438 HC STYL ET INTUB CARD -A: Performed by: ANESTHESIOLOGY

## 2018-12-14 PROCEDURE — 77030020782 HC GWN BAIR PAWS FLX 3M -B

## 2018-12-14 PROCEDURE — 94760 N-INVAS EAR/PLS OXIMETRY 1: CPT

## 2018-12-14 PROCEDURE — 77030031139 HC SUT VCRL2 J&J -A: Performed by: ORTHOPAEDIC SURGERY

## 2018-12-14 PROCEDURE — 74011000258 HC RX REV CODE- 258

## 2018-12-14 PROCEDURE — 74011000250 HC RX REV CODE- 250: Performed by: ORTHOPAEDIC SURGERY

## 2018-12-14 PROCEDURE — 65270000029 HC RM PRIVATE

## 2018-12-14 PROCEDURE — 77030018836 HC SOL IRR NACL ICUM -A: Performed by: ORTHOPAEDIC SURGERY

## 2018-12-14 PROCEDURE — 76000 FLUOROSCOPY <1 HR PHYS/QHP: CPT

## 2018-12-14 PROCEDURE — 77030008684 HC TU ET CUF COVD -B: Performed by: ANESTHESIOLOGY

## 2018-12-14 PROCEDURE — 77030034850: Performed by: ORTHOPAEDIC SURGERY

## 2018-12-14 PROCEDURE — 77030029099 HC BN WAX SSPC -A: Performed by: ORTHOPAEDIC SURGERY

## 2018-12-14 PROCEDURE — P9045 ALBUMIN (HUMAN), 5%, 250 ML: HCPCS

## 2018-12-14 PROCEDURE — 72100 X-RAY EXAM L-S SPINE 2/3 VWS: CPT

## 2018-12-14 PROCEDURE — 77030012890

## 2018-12-14 PROCEDURE — 77030012893

## 2018-12-14 PROCEDURE — P9045 ALBUMIN (HUMAN), 5%, 250 ML: HCPCS | Performed by: ANESTHESIOLOGY

## 2018-12-14 PROCEDURE — 76210000019 HC OR PH I REC 4 TO 4.5 HR: Performed by: ORTHOPAEDIC SURGERY

## 2018-12-14 PROCEDURE — 74011250637 HC RX REV CODE- 250/637: Performed by: PHYSICIAN ASSISTANT

## 2018-12-14 DEVICE — SET SCREW
Type: IMPLANTABLE DEVICE | Site: SPINE LUMBAR | Status: FUNCTIONAL
Brand: FIREBIRD NXG

## 2018-12-14 DEVICE — GRAFT BNE SUB M CANC FRZN MORSELIZED W/ VIABLE CELL TRINITY: Type: IMPLANTABLE DEVICE | Site: SPINE LUMBAR | Status: FUNCTIONAL

## 2018-12-14 DEVICE — DBM 7509215 MAGNIFUSE 1 X 5CM
Type: IMPLANTABLE DEVICE | Site: SPINE LUMBAR | Status: FUNCTIONAL
Brand: MAGNIFUSE® BONE GRAFT

## 2018-12-14 DEVICE — 6.5MM X 45MM CORTICAL BONE SCREW
Type: IMPLANTABLE DEVICE | Site: SPINE LUMBAR | Status: FUNCTIONAL
Brand: JANUS

## 2018-12-14 DEVICE — 40MM ROD, PRE-LORDOSED
Type: IMPLANTABLE DEVICE | Site: SPINE LUMBAR | Status: FUNCTIONAL
Brand: FIREBIRD

## 2018-12-14 DEVICE — TOP LOADING BODY
Type: IMPLANTABLE DEVICE | Site: SPINE LUMBAR | Status: FUNCTIONAL
Brand: FIREBIRD NXG

## 2018-12-14 DEVICE — GRAFT BNE SUB L CANC FRZN MORSELIZED W/ VIABLE CELL TRINITY: Type: IMPLANTABLE DEVICE | Site: SPINE LUMBAR | Status: FUNCTIONAL

## 2018-12-14 RX ORDER — MORPHINE SULFATE 2 MG/ML
2 INJECTION, SOLUTION INTRAMUSCULAR; INTRAVENOUS
Status: DISCONTINUED | OUTPATIENT
Start: 2018-12-14 | End: 2018-12-18 | Stop reason: HOSPADM

## 2018-12-14 RX ORDER — NALOXONE HYDROCHLORIDE 0.4 MG/ML
0.4 INJECTION, SOLUTION INTRAMUSCULAR; INTRAVENOUS; SUBCUTANEOUS AS NEEDED
Status: DISCONTINUED | OUTPATIENT
Start: 2018-12-14 | End: 2018-12-18 | Stop reason: HOSPADM

## 2018-12-14 RX ORDER — DIPHENHYDRAMINE HCL 25 MG
25 CAPSULE ORAL
Status: DISCONTINUED | OUTPATIENT
Start: 2018-12-14 | End: 2018-12-18 | Stop reason: HOSPADM

## 2018-12-14 RX ORDER — FENTANYL CITRATE 50 UG/ML
25 INJECTION, SOLUTION INTRAMUSCULAR; INTRAVENOUS
Status: DISCONTINUED | OUTPATIENT
Start: 2018-12-14 | End: 2018-12-14 | Stop reason: HOSPADM

## 2018-12-14 RX ORDER — FENTANYL CITRATE 50 UG/ML
50 INJECTION, SOLUTION INTRAMUSCULAR; INTRAVENOUS AS NEEDED
Status: DISCONTINUED | OUTPATIENT
Start: 2018-12-14 | End: 2018-12-14 | Stop reason: HOSPADM

## 2018-12-14 RX ORDER — SODIUM CHLORIDE, SODIUM LACTATE, POTASSIUM CHLORIDE, CALCIUM CHLORIDE 600; 310; 30; 20 MG/100ML; MG/100ML; MG/100ML; MG/100ML
75 INJECTION, SOLUTION INTRAVENOUS CONTINUOUS
Status: DISCONTINUED | OUTPATIENT
Start: 2018-12-14 | End: 2018-12-14 | Stop reason: HOSPADM

## 2018-12-14 RX ORDER — ONDANSETRON 2 MG/ML
INJECTION INTRAMUSCULAR; INTRAVENOUS AS NEEDED
Status: DISCONTINUED | OUTPATIENT
Start: 2018-12-14 | End: 2018-12-14 | Stop reason: HOSPADM

## 2018-12-14 RX ORDER — MIDAZOLAM HYDROCHLORIDE 1 MG/ML
INJECTION, SOLUTION INTRAMUSCULAR; INTRAVENOUS AS NEEDED
Status: DISCONTINUED | OUTPATIENT
Start: 2018-12-14 | End: 2018-12-14 | Stop reason: HOSPADM

## 2018-12-14 RX ORDER — ACETAMINOPHEN 10 MG/ML
INJECTION, SOLUTION INTRAVENOUS AS NEEDED
Status: DISCONTINUED | OUTPATIENT
Start: 2018-12-14 | End: 2018-12-14 | Stop reason: HOSPADM

## 2018-12-14 RX ORDER — EPHEDRINE SULFATE 50 MG/ML
INJECTION, SOLUTION INTRAVENOUS
Status: COMPLETED
Start: 2018-12-14 | End: 2018-12-14

## 2018-12-14 RX ORDER — NALOXONE HYDROCHLORIDE 4 MG/.1ML
SPRAY NASAL
Qty: 2 EACH | Refills: 0 | Status: SHIPPED | OUTPATIENT
Start: 2018-12-14 | End: 2019-04-19

## 2018-12-14 RX ORDER — KETOROLAC TROMETHAMINE 30 MG/ML
15 INJECTION, SOLUTION INTRAMUSCULAR; INTRAVENOUS EVERY 6 HOURS
Status: COMPLETED | OUTPATIENT
Start: 2018-12-14 | End: 2018-12-15

## 2018-12-14 RX ORDER — MIDAZOLAM HYDROCHLORIDE 1 MG/ML
1 INJECTION, SOLUTION INTRAMUSCULAR; INTRAVENOUS AS NEEDED
Status: DISCONTINUED | OUTPATIENT
Start: 2018-12-14 | End: 2018-12-14 | Stop reason: HOSPADM

## 2018-12-14 RX ORDER — MORPHINE SULFATE 10 MG/ML
2 INJECTION, SOLUTION INTRAMUSCULAR; INTRAVENOUS
Status: DISCONTINUED | OUTPATIENT
Start: 2018-12-14 | End: 2018-12-14 | Stop reason: HOSPADM

## 2018-12-14 RX ORDER — SODIUM CHLORIDE 0.9 % (FLUSH) 0.9 %
5-10 SYRINGE (ML) INJECTION EVERY 8 HOURS
Status: DISCONTINUED | OUTPATIENT
Start: 2018-12-14 | End: 2018-12-18 | Stop reason: HOSPADM

## 2018-12-14 RX ORDER — MORPHINE SULFATE 4 MG/ML
2 INJECTION INTRAVENOUS
Status: DISCONTINUED | OUTPATIENT
Start: 2018-12-14 | End: 2018-12-14 | Stop reason: SDUPTHER

## 2018-12-14 RX ORDER — SODIUM CHLORIDE 9 MG/ML
50 INJECTION, SOLUTION INTRAVENOUS CONTINUOUS
Status: DISCONTINUED | OUTPATIENT
Start: 2018-12-14 | End: 2018-12-14 | Stop reason: HOSPADM

## 2018-12-14 RX ORDER — ROCURONIUM BROMIDE 10 MG/ML
INJECTION, SOLUTION INTRAVENOUS AS NEEDED
Status: DISCONTINUED | OUTPATIENT
Start: 2018-12-14 | End: 2018-12-14 | Stop reason: HOSPADM

## 2018-12-14 RX ORDER — MORPHINE SULFATE IN 0.9 % NACL 150MG/30ML
PATIENT CONTROLLED ANALGESIA SYRINGE INTRAVENOUS
Status: DISCONTINUED | OUTPATIENT
Start: 2018-12-14 | End: 2018-12-18

## 2018-12-14 RX ORDER — OXYCODONE AND ACETAMINOPHEN 5; 325 MG/1; MG/1
1 TABLET ORAL AS NEEDED
Status: DISCONTINUED | OUTPATIENT
Start: 2018-12-14 | End: 2018-12-14 | Stop reason: HOSPADM

## 2018-12-14 RX ORDER — ALBUMIN HUMAN 50 G/1000ML
12.5 SOLUTION INTRAVENOUS ONCE
Status: COMPLETED | OUTPATIENT
Start: 2018-12-14 | End: 2018-12-14

## 2018-12-14 RX ORDER — CEFAZOLIN SODIUM/WATER 2 G/20 ML
2 SYRINGE (ML) INTRAVENOUS EVERY 8 HOURS
Status: COMPLETED | OUTPATIENT
Start: 2018-12-14 | End: 2018-12-15

## 2018-12-14 RX ORDER — AMOXICILLIN 250 MG
1 CAPSULE ORAL 2 TIMES DAILY
Status: DISCONTINUED | OUTPATIENT
Start: 2018-12-14 | End: 2018-12-18 | Stop reason: HOSPADM

## 2018-12-14 RX ORDER — FENTANYL CITRATE 50 UG/ML
INJECTION, SOLUTION INTRAMUSCULAR; INTRAVENOUS AS NEEDED
Status: DISCONTINUED | OUTPATIENT
Start: 2018-12-14 | End: 2018-12-14 | Stop reason: HOSPADM

## 2018-12-14 RX ORDER — ONDANSETRON 2 MG/ML
4 INJECTION INTRAMUSCULAR; INTRAVENOUS
Status: ACTIVE | OUTPATIENT
Start: 2018-12-14 | End: 2018-12-15

## 2018-12-14 RX ORDER — ALBUMIN HUMAN 50 G/1000ML
25 SOLUTION INTRAVENOUS ONCE
Status: DISCONTINUED | OUTPATIENT
Start: 2018-12-14 | End: 2018-12-14

## 2018-12-14 RX ORDER — LIDOCAINE HYDROCHLORIDE 10 MG/ML
0.1 INJECTION, SOLUTION EPIDURAL; INFILTRATION; INTRACAUDAL; PERINEURAL AS NEEDED
Status: DISCONTINUED | OUTPATIENT
Start: 2018-12-14 | End: 2018-12-14 | Stop reason: HOSPADM

## 2018-12-14 RX ORDER — FACIAL-BODY WIPES
10 EACH TOPICAL DAILY PRN
Status: DISCONTINUED | OUTPATIENT
Start: 2018-12-16 | End: 2018-12-18 | Stop reason: HOSPADM

## 2018-12-14 RX ORDER — HYDROMORPHONE HYDROCHLORIDE 2 MG/ML
INJECTION, SOLUTION INTRAMUSCULAR; INTRAVENOUS; SUBCUTANEOUS AS NEEDED
Status: DISCONTINUED | OUTPATIENT
Start: 2018-12-14 | End: 2018-12-14 | Stop reason: HOSPADM

## 2018-12-14 RX ORDER — GLYCOPYRROLATE 0.2 MG/ML
INJECTION INTRAMUSCULAR; INTRAVENOUS AS NEEDED
Status: DISCONTINUED | OUTPATIENT
Start: 2018-12-14 | End: 2018-12-14 | Stop reason: HOSPADM

## 2018-12-14 RX ORDER — EPHEDRINE SULFATE 50 MG/ML
25 INJECTION, SOLUTION INTRAVENOUS ONCE
Status: COMPLETED | OUTPATIENT
Start: 2018-12-14 | End: 2018-12-14

## 2018-12-14 RX ORDER — SODIUM CHLORIDE 0.9 % (FLUSH) 0.9 %
5-10 SYRINGE (ML) INJECTION AS NEEDED
Status: DISCONTINUED | OUTPATIENT
Start: 2018-12-14 | End: 2018-12-18 | Stop reason: HOSPADM

## 2018-12-14 RX ORDER — OXYCODONE HYDROCHLORIDE 5 MG/1
5 TABLET ORAL
Qty: 60 TAB | Refills: 0 | Status: SHIPPED | OUTPATIENT
Start: 2018-12-14 | End: 2019-04-19

## 2018-12-14 RX ORDER — ALBUMIN HUMAN 50 G/1000ML
SOLUTION INTRAVENOUS AS NEEDED
Status: DISCONTINUED | OUTPATIENT
Start: 2018-12-14 | End: 2018-12-14 | Stop reason: HOSPADM

## 2018-12-14 RX ORDER — MIDAZOLAM HYDROCHLORIDE 1 MG/ML
0.5 INJECTION, SOLUTION INTRAMUSCULAR; INTRAVENOUS
Status: DISCONTINUED | OUTPATIENT
Start: 2018-12-14 | End: 2018-12-14 | Stop reason: HOSPADM

## 2018-12-14 RX ORDER — ACETAMINOPHEN 500 MG
1000 TABLET ORAL EVERY 6 HOURS
Status: DISCONTINUED | OUTPATIENT
Start: 2018-12-14 | End: 2018-12-18 | Stop reason: HOSPADM

## 2018-12-14 RX ORDER — LIDOCAINE HYDROCHLORIDE 20 MG/ML
INJECTION, SOLUTION EPIDURAL; INFILTRATION; INTRACAUDAL; PERINEURAL AS NEEDED
Status: DISCONTINUED | OUTPATIENT
Start: 2018-12-14 | End: 2018-12-14 | Stop reason: HOSPADM

## 2018-12-14 RX ORDER — DIAZEPAM 5 MG/1
5 TABLET ORAL
Status: DISCONTINUED | OUTPATIENT
Start: 2018-12-14 | End: 2018-12-18 | Stop reason: HOSPADM

## 2018-12-14 RX ORDER — NEOSTIGMINE METHYLSULFATE 1 MG/ML
INJECTION INTRAVENOUS AS NEEDED
Status: DISCONTINUED | OUTPATIENT
Start: 2018-12-14 | End: 2018-12-14 | Stop reason: HOSPADM

## 2018-12-14 RX ORDER — ONDANSETRON 2 MG/ML
4 INJECTION INTRAMUSCULAR; INTRAVENOUS AS NEEDED
Status: DISCONTINUED | OUTPATIENT
Start: 2018-12-14 | End: 2018-12-14 | Stop reason: HOSPADM

## 2018-12-14 RX ORDER — ALBUMIN HUMAN 50 G/1000ML
SOLUTION INTRAVENOUS
Status: DISPENSED
Start: 2018-12-14 | End: 2018-12-15

## 2018-12-14 RX ORDER — SUCCINYLCHOLINE CHLORIDE 20 MG/ML
INJECTION INTRAMUSCULAR; INTRAVENOUS AS NEEDED
Status: DISCONTINUED | OUTPATIENT
Start: 2018-12-14 | End: 2018-12-14 | Stop reason: HOSPADM

## 2018-12-14 RX ORDER — POLYETHYLENE GLYCOL 3350 17 G/17G
17 POWDER, FOR SOLUTION ORAL DAILY
Status: DISCONTINUED | OUTPATIENT
Start: 2018-12-15 | End: 2018-12-18 | Stop reason: HOSPADM

## 2018-12-14 RX ORDER — EPHEDRINE SULFATE 50 MG/ML
INJECTION, SOLUTION INTRAVENOUS AS NEEDED
Status: DISCONTINUED | OUTPATIENT
Start: 2018-12-14 | End: 2018-12-14 | Stop reason: HOSPADM

## 2018-12-14 RX ORDER — OXYCODONE HYDROCHLORIDE 5 MG/1
10 TABLET ORAL
Status: DISCONTINUED | OUTPATIENT
Start: 2018-12-14 | End: 2018-12-18 | Stop reason: HOSPADM

## 2018-12-14 RX ORDER — KETAMINE HYDROCHLORIDE 10 MG/ML
INJECTION, SOLUTION INTRAMUSCULAR; INTRAVENOUS AS NEEDED
Status: DISCONTINUED | OUTPATIENT
Start: 2018-12-14 | End: 2018-12-14 | Stop reason: HOSPADM

## 2018-12-14 RX ORDER — PHENYLEPHRINE HCL IN 0.9% NACL 0.4MG/10ML
SYRINGE (ML) INTRAVENOUS AS NEEDED
Status: DISCONTINUED | OUTPATIENT
Start: 2018-12-14 | End: 2018-12-14 | Stop reason: HOSPADM

## 2018-12-14 RX ORDER — SODIUM CHLORIDE, SODIUM LACTATE, POTASSIUM CHLORIDE, CALCIUM CHLORIDE 600; 310; 30; 20 MG/100ML; MG/100ML; MG/100ML; MG/100ML
INJECTION, SOLUTION INTRAVENOUS
Status: DISCONTINUED | OUTPATIENT
Start: 2018-12-14 | End: 2018-12-14 | Stop reason: HOSPADM

## 2018-12-14 RX ORDER — SODIUM CHLORIDE 9 MG/ML
125 INJECTION, SOLUTION INTRAVENOUS CONTINUOUS
Status: DISPENSED | OUTPATIENT
Start: 2018-12-14 | End: 2018-12-15

## 2018-12-14 RX ORDER — CEFAZOLIN SODIUM/WATER 2 G/20 ML
2 SYRINGE (ML) INTRAVENOUS
Status: COMPLETED | OUTPATIENT
Start: 2018-12-14 | End: 2018-12-14

## 2018-12-14 RX ORDER — PROPOFOL 10 MG/ML
INJECTION, EMULSION INTRAVENOUS AS NEEDED
Status: DISCONTINUED | OUTPATIENT
Start: 2018-12-14 | End: 2018-12-14 | Stop reason: HOSPADM

## 2018-12-14 RX ORDER — DEXAMETHASONE SODIUM PHOSPHATE 4 MG/ML
INJECTION, SOLUTION INTRA-ARTICULAR; INTRALESIONAL; INTRAMUSCULAR; INTRAVENOUS; SOFT TISSUE AS NEEDED
Status: DISCONTINUED | OUTPATIENT
Start: 2018-12-14 | End: 2018-12-14 | Stop reason: HOSPADM

## 2018-12-14 RX ORDER — ALBUMIN HUMAN 50 G/1000ML
SOLUTION INTRAVENOUS
Status: COMPLETED
Start: 2018-12-14 | End: 2018-12-14

## 2018-12-14 RX ORDER — OXYCODONE HYDROCHLORIDE 5 MG/1
5 TABLET ORAL
Status: DISCONTINUED | OUTPATIENT
Start: 2018-12-14 | End: 2018-12-18 | Stop reason: HOSPADM

## 2018-12-14 RX ADMIN — NEOSTIGMINE METHYLSULFATE 1 MG: 1 INJECTION INTRAVENOUS at 10:36

## 2018-12-14 RX ADMIN — SODIUM CHLORIDE 125 ML/HR: 900 INJECTION, SOLUTION INTRAVENOUS at 20:04

## 2018-12-14 RX ADMIN — ROCURONIUM BROMIDE 10 MG: 10 INJECTION, SOLUTION INTRAVENOUS at 09:16

## 2018-12-14 RX ADMIN — STANDARDIZED SENNA CONCENTRATE AND DOCUSATE SODIUM 1 TABLET: 8.6; 5 TABLET ORAL at 18:00

## 2018-12-14 RX ADMIN — ACETAMINOPHEN 1000 MG: 500 TABLET ORAL at 17:51

## 2018-12-14 RX ADMIN — EPHEDRINE SULFATE 10 MG: 50 INJECTION, SOLUTION INTRAVENOUS at 10:13

## 2018-12-14 RX ADMIN — DEXAMETHASONE SODIUM PHOSPHATE 4 MG: 4 INJECTION, SOLUTION INTRA-ARTICULAR; INTRALESIONAL; INTRAMUSCULAR; INTRAVENOUS; SOFT TISSUE at 09:03

## 2018-12-14 RX ADMIN — LIDOCAINE HYDROCHLORIDE 80 MG: 20 INJECTION, SOLUTION EPIDURAL; INFILTRATION; INTRACAUDAL; PERINEURAL at 08:51

## 2018-12-14 RX ADMIN — SUCCINYLCHOLINE CHLORIDE 140 MG: 20 INJECTION INTRAMUSCULAR; INTRAVENOUS at 08:51

## 2018-12-14 RX ADMIN — ONDANSETRON 4 MG: 2 INJECTION INTRAMUSCULAR; INTRAVENOUS at 09:03

## 2018-12-14 RX ADMIN — Medication 40 MCG: at 10:21

## 2018-12-14 RX ADMIN — KETAMINE HYDROCHLORIDE 10 MG: 10 INJECTION, SOLUTION INTRAMUSCULAR; INTRAVENOUS at 09:53

## 2018-12-14 RX ADMIN — SODIUM CHLORIDE, SODIUM LACTATE, POTASSIUM CHLORIDE, CALCIUM CHLORIDE: 600; 310; 30; 20 INJECTION, SOLUTION INTRAVENOUS at 08:46

## 2018-12-14 RX ADMIN — ROCURONIUM BROMIDE 10 MG: 10 INJECTION, SOLUTION INTRAVENOUS at 08:51

## 2018-12-14 RX ADMIN — Medication 10 ML: at 23:35

## 2018-12-14 RX ADMIN — KETAMINE HYDROCHLORIDE 20 MG: 10 INJECTION, SOLUTION INTRAMUSCULAR; INTRAVENOUS at 09:15

## 2018-12-14 RX ADMIN — FENTANYL CITRATE 25 MCG: 50 INJECTION INTRAMUSCULAR; INTRAVENOUS at 12:46

## 2018-12-14 RX ADMIN — Medication 10 ML: at 22:52

## 2018-12-14 RX ADMIN — ALBUMIN HUMAN 12.5 G: 50 SOLUTION INTRAVENOUS at 14:51

## 2018-12-14 RX ADMIN — KETOROLAC TROMETHAMINE 15 MG: 30 INJECTION, SOLUTION INTRAMUSCULAR at 23:32

## 2018-12-14 RX ADMIN — EPHEDRINE SULFATE 25 MG: 50 INJECTION, SOLUTION INTRAVENOUS at 14:50

## 2018-12-14 RX ADMIN — ALBUMIN (HUMAN) 12.5 G: 12.5 INJECTION, SOLUTION INTRAVENOUS at 14:51

## 2018-12-14 RX ADMIN — PROPOFOL 150 MG: 10 INJECTION, EMULSION INTRAVENOUS at 08:51

## 2018-12-14 RX ADMIN — HYDROMORPHONE HYDROCHLORIDE 0.5 MG: 2 INJECTION, SOLUTION INTRAMUSCULAR; INTRAVENOUS; SUBCUTANEOUS at 09:56

## 2018-12-14 RX ADMIN — ACETAMINOPHEN 1000 MG: 10 INJECTION, SOLUTION INTRAVENOUS at 09:57

## 2018-12-14 RX ADMIN — PROPOFOL 50 MG: 10 INJECTION, EMULSION INTRAVENOUS at 08:57

## 2018-12-14 RX ADMIN — Medication 10 ML: at 22:51

## 2018-12-14 RX ADMIN — EPHEDRINE SULFATE 5 MG: 50 INJECTION, SOLUTION INTRAVENOUS at 10:42

## 2018-12-14 RX ADMIN — FENTANYL CITRATE 25 MCG: 50 INJECTION INTRAMUSCULAR; INTRAVENOUS at 13:00

## 2018-12-14 RX ADMIN — MIDAZOLAM HYDROCHLORIDE 2 MG: 1 INJECTION, SOLUTION INTRAMUSCULAR; INTRAVENOUS at 08:46

## 2018-12-14 RX ADMIN — FENTANYL CITRATE 50 MCG: 50 INJECTION, SOLUTION INTRAMUSCULAR; INTRAVENOUS at 09:02

## 2018-12-14 RX ADMIN — PROPOFOL 30 MG: 10 INJECTION, EMULSION INTRAVENOUS at 09:53

## 2018-12-14 RX ADMIN — EPHEDRINE SULFATE 10 MG: 50 INJECTION, SOLUTION INTRAVENOUS at 10:48

## 2018-12-14 RX ADMIN — GLYCOPYRROLATE 0.2 MG: 0.2 INJECTION INTRAMUSCULAR; INTRAVENOUS at 10:36

## 2018-12-14 RX ADMIN — Medication 80 MCG: at 10:31

## 2018-12-14 RX ADMIN — Medication: at 12:23

## 2018-12-14 RX ADMIN — Medication 40 MCG: at 10:09

## 2018-12-14 RX ADMIN — ALBUMIN HUMAN 250 ML: 50 SOLUTION INTRAVENOUS at 10:15

## 2018-12-14 RX ADMIN — ALBUMIN (HUMAN) 12.5 G: 12.5 INJECTION, SOLUTION INTRAVENOUS at 13:46

## 2018-12-14 RX ADMIN — Medication 40 MCG: at 09:31

## 2018-12-14 RX ADMIN — HYDROMORPHONE HYDROCHLORIDE 0.5 MG: 2 INJECTION, SOLUTION INTRAMUSCULAR; INTRAVENOUS; SUBCUTANEOUS at 09:19

## 2018-12-14 RX ADMIN — KETOROLAC TROMETHAMINE 15 MG: 30 INJECTION, SOLUTION INTRAMUSCULAR at 17:51

## 2018-12-14 RX ADMIN — Medication 2 G: at 09:15

## 2018-12-14 RX ADMIN — SODIUM CHLORIDE 125 ML/HR: 900 INJECTION, SOLUTION INTRAVENOUS at 11:48

## 2018-12-14 RX ADMIN — FENTANYL CITRATE 50 MCG: 50 INJECTION, SOLUTION INTRAMUSCULAR; INTRAVENOUS at 08:51

## 2018-12-14 RX ADMIN — Medication 2 G: at 17:51

## 2018-12-14 NOTE — PERIOP NOTES
1210: Notified by radiologist of intraop films showing possible fractured screws - see Results Review for full note. Notified [place], [place] of results. No further orders needed at this time - per PA, patient okay to continue to floor.

## 2018-12-14 NOTE — PROGRESS NOTES
1450: Assumed care of patient. 25mg Ephedrine IM and 250 albumin 5% administered. 1530: Called to Dr. Jenny Mo, patient OK to transfer to floor. /50.

## 2018-12-14 NOTE — OP NOTES
[address] [place] [address] REPORT    Benjamin Blanco  MR#: [id number]  : 1945  ACCOUNT #: [de-identified]   DATE OF SERVICE: 2018    PREOPERATIVE DIAGNOSIS:  Lumbar stenosis L2-3, L3-4, L4-5 and L5-S1 with lumbar spondylolisthesis L4-5. POSTOPERATIVE DIAGNOSIS:  Lumbar stenosis L2-3, L3-4, L4-5 and L5-S1 with lumbar spondylolisthesis L4-5. PROCEDURES PERFORMED:  Lumbar laminectomy L2-S1 with posterior lumbar fusion, L4-L5 posterior segment fixation L4-5. SURGEON:  Diego Seals MD    ASSISTANT:  Gilles Sanz PA-C    ESTIMATED BLOOD LOSS:  500 mL. COMPLICATIONS:  None. ANESTHESIA:  General    SPECIMENS REMOVED:  None    IMPLANTS:  Orthofix Janus    INDICATIONS:  A pleasant 77-year-old female with chronic back pain, bilateral leg pain, severe spinal stenosis from L2 all the way down to S1 with a spondylolisthesis at L4-L5 who had failed conservative treatment, understood the risks and benefits and elected to proceed. DESCRIPTION OF PROCEDURE:  The patient was identified, brought to the operative suite, underwent general anesthesia without difficulty. A Ricketts catheter placed. Preoperative neuromonitoring lines was placed, baselines obtained and remained stable. The patient understood the risks and benefits and elected to proceed. The patient was identified, brought to the operative suite and underwent general anesthesia. Ricketts catheter placed. Neuromonitoring lines was placed, baselines obtained. Patient placed in prone position on the open [employer] table. Back was prepped and draped sterilely. After prepping and draping, timeout performed. Incision was made in the midline. Dissection was carried out with exposure from approximately L2 down to S1. We exposed the hypertrophic facet of L4-L5 as well as transverse process of L4-L5 and clearing the soft tissue off the inner membranous ligament as well at that level.   We then started wide laminectomy with removal of the spinous processes of L5, L4, L3 and inferior portion of L2 and then did central laminectomies first with undercutting the lamina with #3 and #4 Kerrisons for central decompression, severe spinal stenosis, all the way down from L2-3 down to L4-5. This allowed access to the lateral recesses and we did lateral recess decompression with partial facet resection as well as removal of the remaining ligament, superior articular process where needed for further foraminal decompression. Pedicle to pedicle decompression was completed from L2 down to S1. Using standard bony landmarks, we then palpated the L4 and L5 pedicles and then cannulated these using standard bony landmarks. A ball tip probe was used as well as neuro monitoring and we placed 6.5 x 45 mm Orthofix Janus screws at those levels. We tested out greater than 15 milliamps. Wounds irrigated with pulse ox and bacitracin 3000 mL. We decorticated the transverse processes at L4 and L5 packed Anastasia allograft, local bone graft as well as bone chips into the lateral gutters bilaterally. Longitudinal rods were used to test the pedicle screws. Final tightening performed. The patient returned to the PACU. Medium Hemovac placed. Final x-rays demonstrated stable fixation. 0 Vicryl on the fascial layer, 2-0 Vicryl subcuticular and zipline closure.       MD Cynthia Rizvi / [place]  D: 12/14/2018 12:29     T: 12/14/2018 14:02  JOB #: [id number]

## 2018-12-14 NOTE — BRIEF OP NOTE
BRIEF OPERATIVE NOTE    Date of Procedure: 12/14/2018   Preoperative Diagnosis: Pseudoclaudication syndrome [M48.062]  Degeneration of lumbar intervertebral disc [M51.36]  Acquired spondylolisthesis [M43.10]  Postoperative Diagnosis: Pseudoclaudication syndrome [M48.062]  Degeneration of lumbar intervertebral disc [M51.36]  Acquired spondylolisthesis       Procedure(s):  LUMBAR LAMINECTOMY L2-5, LUMBAR FUSION L4-5 (REQ FLIP ROOMS)  Surgeon(s) and Role: Phoenix Paz MD - Primary         Surgical Assistant: Nabeel Vinson PA-C    Surgical Staff:  Circ-1: Hannah Mckinney RN  Circ-Relief: Serafin Matthews RN  Physician Assistant: HONEY Ledezma  Scrub RN-1: Serafin Matthews RN  Scrub RN-2: Romelia Felty Event Time In Time Out   Incision Start 6933    Incision Close 1110      Anesthesia: General   Estimated Blood Loss: 500-550cc  Specimens: * No specimens in log *   Findings: Lumbar stenosis   Complications: None  Implants:   Implant Name Type Inv.  Item Serial No.  Lot No. LRB No. Used Action   GRAFT BNE ELITE JOSE ROBERTO LG --  - [id number]  GRAFT BNE ELITE JOSE ROBERTO LG --  [id number] MUSCULOSKELETAL TRANS 6810 N/A 1 Implanted   GRAFT BNE ELITE JOSE ROBERTO MED --  - [id number]  GRAFT BNE EL[employer] JOSE ROBERTO MED --  [id number] MUSCULOSKELETAL TRANS 6610 N/A 1 Implanted   GRAFT BNE SUB PC 1X5CM -- MAGNIFUSE - [id number]  GRAFT BNE SUB PC 1X5CM -- MAGNIFUSE [id number] [employer] SPINALGRAFT TECH N/A N/A 1 Implanted   SCR SPNE FIX MIDLNE 1.3K51TC -- JANUS - SN/A  SCR SPNE FIX MIDLNE 5.0K59WT -- JANUS N/A ORTHOFIX SPINAL IMPLANTS N/A N/A 4 Implanted   BODY TOP LD IMPL -- FIREBIRD NXG - SN/A  BODY TOP LD IMPL -- FIREBIRD NXG N/A ORTHOFIX SPINAL IMPLANTS N/A N/A 4 Implanted   SET SCR -- FIREBIRD NXG - SN/A  SET SCR -- FIREBIRD NXG N/A ORTHOFIX SPINAL IMPLANTS N/A N/A 4 Implanted   DAMON PRELORDOSED 5.5X40 TI -- DYAN - SN/A  DAMON PRELORDOSED 5.5X40 TI -- DYAN N/A ORTHOFIX SPINAL IMPLANTS N/A N/A 2 Implanted

## 2018-12-14 NOTE — ANESTHESIA POSTPROCEDURE EVALUATION
Procedure(s):  LUMBAR LAMINECTOMY L2-5, LUMBAR FUSION L4-5 (REQ FLIP ROOMS).     Anesthesia Post Evaluation        Patient location during evaluation: PACU  Patient participation: complete - patient participated  Level of consciousness: awake and alert  Pain management: adequate  Airway patency: patent  Anesthetic complications: no  Cardiovascular status: acceptable  Respiratory status: acceptable  Hydration status: acceptable  Comments: Prepared for discharge  Post anesthesia nausea and vomiting:  none      Visit Vitals  /42   Pulse 75   Temp 36.8 °C (98.3 °F)   Resp 22   Ht 5' 4\" (1.626 m)   Wt 64.4 kg (142 lb)   SpO2 99%   BMI 24.37 kg/m²

## 2018-12-14 NOTE — ANESTHESIA PREPROCEDURE EVALUATION
Anesthetic History   No history of anesthetic complications            Review of Systems / Medical History  Patient summary reviewed, nursing notes reviewed and pertinent labs reviewed    Pulmonary  Within defined limits                 Neuro/Psych         Psychiatric history    Comments: Spinal stenosis, lumbar Cardiovascular    Hypertension        Dysrhythmias   Hyperlipidemia    Exercise tolerance: <4 METS     GI/Hepatic/Renal               Comments: Diverticulosis  Endo/Other    Diabetes  Hypothyroidism  Arthritis     Other Findings              Physical Exam    Airway  Mallampati: III  TM Distance: 4 - 6 cm  Neck ROM: decreased range of motion   Mouth opening: Diminished (comment)     Cardiovascular  Regular rate and rhythm,  S1 and S2 normal,  no murmur, click, rub, or gallop  Rhythm: regular  Rate: normal         Dental      Comments: chipped   Pulmonary  Breath sounds clear to auscultation               Abdominal  GI exam deferred       Other Findings            Anesthetic Plan    ASA: 2  Anesthesia type: general          Induction: Intravenous  Anesthetic plan and risks discussed with: Patient      Prone position R&B discussed

## 2018-12-14 NOTE — ROUTINE PROCESS
TRANSFER - OUT REPORT:    Verbal report given to Geraldo Hinojosa RN(name) on Grabiel De Santiago  being transferred to [facility](unit) for routine post - op       Report consisted of patients Situation, Background, Assessment and   Recommendations(SBAR). Time Pre op antibiotic BESMJ:8423  Anesthesia Stop time: 0948  Ricketts Present on Transfer to floor:YES  Order for Ricketts on Chart:YES  Discharge Prescriptions with Chart:YES    Information from the following report(s) SBAR, Kardex, OR Summary, Procedure Summary and Cardiac Rhythm NSR was reviewed with the receiving nurse. Opportunity for questions and clarification was provided. Is the patient on 02? YES       L/Min 2    Is the patient on a monitor? NO    Is the nurse transporting with the patient? NO    Surgical Waiting Area notified of patient's transfer from PACU? YES      The following personal items collected during your admission accompanied patient upon transfer:   Dental Appliance: Dental Appliances: None  Vision:    Hearing Aid:    Jewelry:    Clothing: Clothing: At bedside(Bag of clothing returned in PACU)  Other Valuables:  Other Valuables: Eyeglasses, Brace(Back brace and eyeglasses returned to patient in PACU)  Valuables sent to safe:

## 2018-12-14 NOTE — PERIOP NOTES
Patient: Jessica Larson MRN: [id number]  SSN: [id number]   YOB: 1945  Age: 68 y.o. Sex: female     Patient is status post Procedure(s):  LUMBAR LAMINECTOMY L2-5, LUMBAR FUSION L4-5 (REQ FLIP ROOMS). Surgeon(s) and Role: Baldev Agarwal MD - Primary    Local/Dose/Irrigation:  40 ml Surgical pain solution injected to back at end of case.                  Peripheral IV 12/14/18 Right Hand (Active)          Hemovac Posterior Back (Active)                 Dressing/Packing:  Wound Back Posterior-DRESSING TYPE: ABD pad;Special tape (comment)(Hypafix) (12/14/18 1100)

## 2018-12-14 NOTE — DISCHARGE INSTRUCTIONS
After Hospital Care Plan:  Discharge Instructions Lumbar Fusion Surgery   Dr. Yasmin Maldonado   Patient Name: Sedrick Lynn    Date of procedure: 12/14/2018  Date of discharge: 12/14/2018    Procedure: Procedure(s):  LUMBAR LAMINECTOMY L2-5, LUMBAR FUSION L4-5 (REQ FLIP ROOMS)  PCP: Francisca Mclaughlin NP    Follow up appointments  -follow up with Dr. Dr. Yasmin Maldonado in 2 weeks. Call [phone] to make an appointment as soon as you get home from the hospital.    [facility]   phone: _______________________  The agency will contact you to arrange dates/times for visits. Please call them if you do not hear from them within 24 hours after you are discharged  Physical therapy 3 times a week for 3 weeks  Nursing-initial assessment and as needed    When to call your Orthopaedic Surgeon:  -Signs of infection-if your incision is red; continues to have drainage; drainage has a foul odor or if you have a persistent fever over 101 degrees for 24 hours  -Nausea or vomiting, severe headache  -Loss of bowel or bladder function, inability to urinate  -Changes in sensation in your arms or legs (numbness, tingling, loss of color)  -Increased weakness-greater than before your surgery  -Severe pain or pain not relieved by medications  -Signs of a blood clot in your leg-calf pain, tenderness, redness, swelling of lower leg    When to call your Primary Care Physician:  -Concerns about medical conditions such as diabetes, high blood pressure, asthma, congestive heart failure  -Call if blood sugars are elevated, persistent headache or dizziness, coughing or congestion, constipation or diarrhea, burning with urination, abnormal heart rate    When to call 911 and go to the nearest emergency room:  -Acute onset of chest pain, shortness of breath, difficulty breathing    Activity  -You are going home a well person, be as active as possible. Your only exercise should be walking.   Start with short frequent walks and increase your walking distance each day.  -Limit the amount of time you sit to 20-30 minute intervals. Sitting for prolonged periods of time will be uncomfortable for you following surgery.  -Do NOT lift anything over 5 pounds  -Do NOT do any straining, twisting or bending  -When you are in bed, you may lay on your back or on either side. Do NOT lie on your stomach    Brace  -If you have a back brace, you should wear your brace at all times when you are out of bed. Do not wear the brace while in bed or showering.  -Remember to always wear a cotton t-shirt underneath your brace.  -Do not bend or twist when your brace is off    Diet  -Resume usual diet; drink plenty of fluids; eat foods high in fiber  -It is important to have regular bowel movements. Pain medications may cause constipation. You may want to take a stool softener (such as Senokot-S or Colace) to prevent constipation.   -If constipation occurs, take a laxative (such as Dulcolax tablets, Milk of Magnesia, or a suppository). Laxatives should only be used if the above preventable measures have failed and you still have not had a bowel movement after three days    Driving  -You may not drive or return to work until instructed by your physician. However, you may ride in the car for short periods of time. Incision Care  Your incision has been closed with absorbable sutures and the Zipline skin closure system. This will assist with healing. The [place] Setting is to remain on your incision for 2 weeks. A dry dressing (ABD and tape) will be placed over it and should be changed daily, for at least the first several days after your surgery. If you have no incisional drainage, you may leave the incision open to air if you wish, still leaving the Zipline in place. Please make sure to wash your hands prior to touching your dressing. You may take brief showers but do not run the water directly onto the wound.  After your shower, blot your incision dry with a soft towel and replace the dry dressing. Do not allow the tape to come in contact with the Zipline. Do not rub or apply any lotions or ointments to your incision site. Do not soak or scrub your wound. The Zipline dressing will be removed during your two week follow-up appointment. If you experience drainage leaking from underneath the Zipline or if it peels off before 2 weeks, please contact your orthopedic surgeons office. Showering  -You may shower in approximately 4 days after your surgery.    -Leave the dressing on during your shower. Do NOT allow the water to run directly onto your dressing. Once you get out of the shower, gently pat the dressing dry. -Reminder- your brace can be removed while showering. Remember to not bend or twist while your brace is off.    -Do not take a tub bath. Preventing blood clots  -You have been given T.E.D. stockings to wear. Continue to wear these for 7 days after your discharge. Put them on in the morning and take them off at night.    -They are used to increase your circulation and prevent blood clots from forming in your legs  -T. E.D. stockings can be machine washed, temperature not to exceed 160° F (71°C) and machine dried for 15 to 20 minutes, temperature not to exceed 250° F (121°C). Pain management  -Take pain medication as prescribed; decrease the amount you use as your pain lessens  -DO not wait until you are in extreme pain to take your medication.  -Avoid alcoholic beverages while taking pain medication    Pain Medication Safety  DO:  -Read the Medication Guide   -Take your medicine exactly as prescribed   -Store your medicine away from children and in a safe place   -Flush unused medicine down the toilet   -Call your healthcare provider for medical advice about side effects. You may report side effects to FDA at 8-658-FDA-9164.   -Please be aware that many medications contain Tylenol.   We do not want you to over medicate so please read the information below as a guide. Do not take more than 4 Grams of Tylenol in a 24 hour period. (There are 1000 milligrams in one Gram)                                                                                                                                                                                                                                                Percocet contains 325 mg of Tylenol per tablet (do not take more than 12 tablets in 24 hours)  Lortab contains 500 mg of Tylenol per tablet (do not take more than 8 tablets in 24 hours)  Norco contains 325 mg of Tylenol per tablet (do not take more than 12 tablets in 24 hours). DO NOT:  -Do not give your medicine to others   -Do not take medicine unless it was prescribed for you   -Do not stop taking your medicine without talking to your healthcare provider   -Do not break, chew, crush, dissolve, or inject your medicine. If you cannot swallow your medicine whole, talk to your healthcare provider.  -Do not drink alcohol while taking this medicine  -Do not take anti-inflammatory medications or aspirin unless instructed by your     physician.

## 2018-12-15 LAB
ANION GAP SERPL CALC-SCNC: 7 MMOL/L (ref 5–15)
BUN SERPL-MCNC: 15 MG/DL (ref 6–20)
BUN/CREAT SERPL: 25 (ref 12–20)
CALCIUM SERPL-MCNC: 8.2 MG/DL (ref 8.5–10.1)
CHLORIDE SERPL-SCNC: 114 MMOL/L (ref 97–108)
CO2 SERPL-SCNC: 23 MMOL/L (ref 21–32)
CREAT SERPL-MCNC: 0.6 MG/DL (ref 0.55–1.02)
GLUCOSE SERPL-MCNC: 121 MG/DL (ref 65–100)
HGB BLD-MCNC: 11.5 G/DL (ref 11.5–16)
HGB BLD-MCNC: 8.2 G/DL (ref 11.5–16)
HGB BLD-MCNC: 9.8 G/DL (ref 11.5–16)
POTASSIUM SERPL-SCNC: 3.6 MMOL/L (ref 3.5–5.1)
SODIUM SERPL-SCNC: 144 MMOL/L (ref 136–145)

## 2018-12-15 PROCEDURE — 65270000029 HC RM PRIVATE

## 2018-12-15 PROCEDURE — 85018 HEMOGLOBIN: CPT

## 2018-12-15 PROCEDURE — 97165 OT EVAL LOW COMPLEX 30 MIN: CPT

## 2018-12-15 PROCEDURE — 97162 PT EVAL MOD COMPLEX 30 MIN: CPT

## 2018-12-15 PROCEDURE — 36415 COLL VENOUS BLD VENIPUNCTURE: CPT

## 2018-12-15 PROCEDURE — 97110 THERAPEUTIC EXERCISES: CPT

## 2018-12-15 PROCEDURE — 74011250636 HC RX REV CODE- 250/636: Performed by: PHYSICIAN ASSISTANT

## 2018-12-15 PROCEDURE — 97116 GAIT TRAINING THERAPY: CPT

## 2018-12-15 PROCEDURE — 74011250637 HC RX REV CODE- 250/637: Performed by: PHYSICIAN ASSISTANT

## 2018-12-15 PROCEDURE — 77010033678 HC OXYGEN DAILY

## 2018-12-15 PROCEDURE — 80048 BASIC METABOLIC PNL TOTAL CA: CPT

## 2018-12-15 PROCEDURE — 97535 SELF CARE MNGMENT TRAINING: CPT

## 2018-12-15 PROCEDURE — G8987 SELF CARE CURRENT STATUS: HCPCS

## 2018-12-15 PROCEDURE — G8988 SELF CARE GOAL STATUS: HCPCS

## 2018-12-15 PROCEDURE — 74011000250 HC RX REV CODE- 250: Performed by: PHYSICIAN ASSISTANT

## 2018-12-15 PROCEDURE — 94762 N-INVAS EAR/PLS OXIMTRY CONT: CPT

## 2018-12-15 RX ADMIN — Medication 10 ML: at 12:23

## 2018-12-15 RX ADMIN — Medication 10 ML: at 15:39

## 2018-12-15 RX ADMIN — POLYETHYLENE GLYCOL 3350 17 G: 17 POWDER, FOR SOLUTION ORAL at 10:14

## 2018-12-15 RX ADMIN — OXYCODONE HYDROCHLORIDE 10 MG: 5 TABLET ORAL at 17:47

## 2018-12-15 RX ADMIN — ACETAMINOPHEN 1000 MG: 500 TABLET ORAL at 05:17

## 2018-12-15 RX ADMIN — KETOROLAC TROMETHAMINE 15 MG: 30 INJECTION, SOLUTION INTRAMUSCULAR at 12:21

## 2018-12-15 RX ADMIN — Medication 10 ML: at 05:10

## 2018-12-15 RX ADMIN — KETOROLAC TROMETHAMINE 15 MG: 30 INJECTION, SOLUTION INTRAMUSCULAR at 05:04

## 2018-12-15 RX ADMIN — DIAZEPAM 5 MG: 5 TABLET ORAL at 05:30

## 2018-12-15 RX ADMIN — Medication 10 ML: at 21:45

## 2018-12-15 RX ADMIN — ACETAMINOPHEN 1000 MG: 500 TABLET ORAL at 12:19

## 2018-12-15 RX ADMIN — STANDARDIZED SENNA CONCENTRATE AND DOCUSATE SODIUM 1 TABLET: 8.6; 5 TABLET ORAL at 17:55

## 2018-12-15 RX ADMIN — Medication 10 ML: at 05:04

## 2018-12-15 RX ADMIN — SODIUM CHLORIDE 125 ML/HR: 900 INJECTION, SOLUTION INTRAVENOUS at 05:08

## 2018-12-15 RX ADMIN — MORPHINE SULFATE 2 MG: 2 INJECTION, SOLUTION INTRAMUSCULAR; INTRAVENOUS at 21:44

## 2018-12-15 RX ADMIN — STANDARDIZED SENNA CONCENTRATE AND DOCUSATE SODIUM 1 TABLET: 8.6; 5 TABLET ORAL at 10:14

## 2018-12-15 RX ADMIN — Medication 2 G: at 00:40

## 2018-12-15 RX ADMIN — ACETAMINOPHEN 1000 MG: 500 TABLET ORAL at 00:00

## 2018-12-15 RX ADMIN — OXYCODONE HYDROCHLORIDE 10 MG: 5 TABLET ORAL at 12:21

## 2018-12-15 RX ADMIN — DIAZEPAM 5 MG: 5 TABLET ORAL at 15:47

## 2018-12-15 NOTE — PROGRESS NOTES
Problem: Mobility Impaired (Adult and Pediatric)  Goal: *Acute Goals and Plan of Care (Insert Text)  Physical Therapy Goals  Initiated 12/15/2018    1. Patient will move from supine to sit and sit to supine  in bed with modified independence within 4 days. 2. Patient will perform sit to stand with modified independence within 4 days. 3. Patient will ambulate with modified independence for 150 feet with the least restrictive device within 4 days. 4. Patient will ascend/descend 1 stairs with 1 handrail(s) with minimal assistance/contact guard assist within 4 days. 5. Patient will verbalize and demonstrate understanding of spinal precautions (No bending, lifting greater than 5 lbs, or twisting; log-roll technique; frequent repositioning as instructed) within 4 days. physical Therapy EVALUATION  Patient: Melia Beltran (68 y.o. female)  Date: 12/15/2018  Primary Diagnosis: Pseudoclaudication syndrome [M48.062]  Degeneration of lumbar intervertebral disc [M51.36]  Acquired spondylolisthesis [M43.10]  Procedure(s) (LRB):  LUMBAR LAMINECTOMY L2-5, LUMBAR FUSION L4-5 (REQ FLIP ROOMS) (N/A) 1 Day Post-Op   Precautions:  Fall, Back    ASSESSMENT :  Based on the objective data described below, the patient presents with pain, decreased balance, decreased strength, and overall decreased functional mobility s/p lum lami L2-5, Lumbar fusion L4-5 POD #1. Patient able to perform bed mobility with min-mod A and cues to adhere to back precautions. Patient able to complete sit to stand with mod A and stands with a very flexed posture and crouched knees. Patient able to amb 5 ft to bedside chair with bilateral HHA x 2 and overall flexed posture. Patient reports that she ambulated similar to this prior to surgery. Patient's vitals stable throughout session. Anticipate patient may require HHPT vs. rehab at discharge, however, continued assessment will be needed as patient's mobility progresses.      Patient will benefit from skilled intervention to address the above impairments. Patients rehabilitation potential is considered to be Fair  Factors which may influence rehabilitation potential include:   []         None noted  []         Mental ability/status  [x]         Medical condition  []         Home/family situation and support systems  []         Safety awareness  []         Pain tolerance/management  []         Other:      PLAN :  Recommendations and Planned Interventions:  [x]           Bed Mobility Training             [x]    Neuromuscular Re-Education  [x]           Transfer Training                   []    Orthotic/Prosthetic Training  [x]           Gait Training                         [x]    Modalities  [x]           Therapeutic Exercises           [x]    Edema Management/Control  [x]           Therapeutic Activities            [x]    Patient and Family Training/Education  []           Other (comment):    Frequency/Duration: Patient will be followed by physical therapy daily to address goals. Discharge Recommendations: To Be Determined  Further Equipment Recommendations for Discharge: TBD     SUBJECTIVE:   Patient stated Marlene Pak still have occasional spasms in my legs.     OBJECTIVE DATA SUMMARY:   HISTORY:    Past Medical History:   Diagnosis Date    Anxiety     Arrhythmia     PROLONGED QT EKG 7-26-18    Arthritis     Bipolar 1 disorder [facility])     son not sure where patient was diagnosed(PT. DENIES 12-7-18)    Chronic pain     Diabetes ([facility] [address]     Diverticulosis of large intestine 2018    sigmoid & descending    Heart murmur     Hypertension     HX HTN - NO MEDS CURRENTLY(12-7-18)    Prolonged Q-T interval on ECG 12/10/2018    Thyroid disease     GOITER     Past Surgical History:   Procedure Laterality Date    HX HYSTERECTOMY  1986    HX OPEN CHOLECYSTECTOMY  1986    3 months after childbirth    HX TOTAL VAGINAL HYSTERECTOMY  1986    ovaries out also - heavy bleeding      Prior Level of Function/Home Situation: supervision living with her son using 3 prong base cane  Personal factors and/or comorbidities impacting plan of care:     Home Situation  # Steps to Enter: 0  One/Two Story Residence: One story  Living Alone: No  Support Systems: Family member(s)  Patient Expects to be Discharged to[de-identified] Apartment  Current DME Used/Available at Home: Walker, rolling(3 prong cane)    EXAMINATION/PRESENTATION/DECISION MAKING:   Critical Behavior:  Neurologic State: Alert, Appropriate for age  Orientation Level: Oriented X4  Cognition: Appropriate decision making, Appropriate for age attention/concentration  Safety/Judgement: Awareness of environment, Fall prevention  Skin:  Dressing blood tinged and leaking through to top sheet; RN notified and changed dressing  Edema: Left dorsum of hand edematous   Range Of Motion:  AROM: Generally decreased, functional(decreased knee extension bilaterally due to tight hamstrings)           PROM: Generally decreased, functional(decreased knee extension bilaterally L>R, tight hamstrings)           Strength:    Strength: Generally decreased, functional   B LEs        Tone & Sensation:   Tone: Abnormal              Sensation: Intact               Coordination:  Coordination: Generally decreased, functional  Vision:     Wears glasses  Functional Mobility:  Bed Mobility:  Rolling: Minimum assistance  Supine to Sit: Moderate assistance     Scooting: Contact guard assistance  Transfers:  Sit to Stand: Moderate assistance  Stand to Sit: Moderate assistance                       Balance:   Sitting: Intact  Standing: Impaired; With support  Standing - Static: Fair  Standing - Dynamic : Poor  Ambulation/Gait Training:  Distance (ft): 5 Feet (ft)  Assistive Device: Gait belt(HHA x 2)  Ambulation - Level of Assistance: Minimal assistance(HHA x 2)        Gait Abnormalities: Antalgic;Decreased step clearance        Base of Support: Widened     Speed/Hetal: Shuffled; Slow      Very flexed posture, flexed knees Therapeutic Exercises: Ankle pumps x 10 reps, LAQ x 10 reps while seated    Functional Measure:  Barthel Index:    Bathin  Bladder: 0  Bowels: 0  Groomin  Dressin  Feedin  Mobility: 0  Stairs: 0  Toilet Use: 0  Transfer (Bed to Chair and Back): 5  Total: 15       Barthel and G-code impairment scale:  Percentage of impairment CH  0% CI  1-19% CJ  20-39% CK  40-59% CL  60-79% CM  80-99% CN  100%   Barthel Score 0-100 100 99-80 79-60 59-40 20-39 1-19   0   Barthel Score 0-20 20 17-19 13-16 9-12 5-8 1-4 0      The Barthel ADL Index: Guidelines  1. The index should be used as a record of what a patient does, not as a record of what a patient could do. 2. The main aim is to establish degree of independence from any help, physical or verbal, however minor and for whatever reason. 3. The need for supervision renders the patient not independent. 4. A patient's performance should be established using the best available evidence. Asking the patient, friends/relatives and nurses are the usual sources, but direct observation and common sense are also important. However direct testing is not needed. 5. Usually the patient's performance over the preceding 24-48 hours is important, but occasionally longer periods will be relevant. 6. Middle categories imply that the patient supplies over 50 per cent of the effort. 7. Use of aids to be independent is allowed. Anthony Jack., Barthel, D.W. (2082). Functional evaluation: the Barthel Index. [address] [facility] (14)2. AV Nichole, Ronald Thomas., Cristobal Mclaughlin., [facility], [address] [place] [address] (). Measuring the change indisability after inpatient rehabilitation; comparison of the responsiveness of the Barthel Index and Functional Stutsman Measure. Journal of Neurology, Neurosurgery, and Psychiatry, 66(4), 787-128.   ANGEL Mcmullen.ANU, Ben Richmond  WMoisesJ.TELMA, & Molly Stevens MMoisesA. (2004.) Assessment of post-stroke quality of life in cost-effectiveness studies: The usefulness of the Barthel Index and the EuroQoL-5D. Quality of Life Research, 13, 373-10       G codes: In compliance with CMSs Claims Based Outcome Reporting, the following G-code set was chosen for this patient based on their primary functional limitation being treated: The outcome measure chosen to determine the severity of the functional limitation was the Barthel Index with a score of 15  which was correlated with the impairment scale. ? Mobility - Walking and Moving Around:     - CURRENT STATUS: CM - 80%-99% impaired, limited or restricted    - GOAL STATUS: CL - 60%-79% impaired, limited or restricted    - D/C STATUS:  ---------------To be determined---------------      Physical Therapy Evaluation Charge Determination   History Examination Presentation Decision-Making   MEDIUM  Complexity : 1-2 comorbidities / personal factors will impact the outcome/ POC  MEDIUM Complexity : 3 Standardized tests and measures addressing body structure, function, activity limitation and / or participation in recreation  MEDIUM Complexity : Evolving with changing characteristics  MEDIUM Complexity : FOTO score of 26-74      Based on the above components, the patient evaluation is determined to be of the following complexity level: MEDIUM    Pain:  Pain Scale 1: Numeric (0 - 10)  Pain Intensity 1: 7  Pain Location 1: Back;Hip;Leg;Spine, lumbar  Pain Orientation 1: Lower; Posterior;Left;Right(Low back pain/left & right leg spasms)  Pain Description 1: Cramping;Radiating; Other (comment)(spasms)  Pain Intervention(s) 1: Medication (see MAR)  Activity Tolerance:   Increased pain reported throughout back and legs, vitals stable  Please refer to the flowsheet for vital signs taken during this treatment.   After treatment:   [x]         Patient left in no apparent distress sitting up in chair  []         Patient left in no apparent distress in bed  [x]         Call bell left within reach  [x]         Nursing notified  []         Caregiver present  []         Bed alarm activated    COMMUNICATION/EDUCATION:   The patients plan of care was discussed with: Occupational Therapist and Registered Nurse. [x]         Fall prevention education was provided and the patient/caregiver indicated understanding. [x]         Patient/family have participated as able in goal setting and plan of care. []         Patient/family agree to work toward stated goals and plan of care. []         Patient understands intent and goals of therapy, but is neutral about his/her participation. []         Patient is unable to participate in goal setting and plan of care.     Thank you for this referral.  Camille Gee, PT   Time Calculation: 26 mins

## 2018-12-15 NOTE — PROGRESS NOTES
Bedside and Verbal shift change report given to Trevor Fay RN (oncoming nurse) by Olga Mnedoza (offgoing nurse). Report included the following information SBAR, Kardex, OR Summary, Intake/Output, MAR and Recent Results.

## 2018-12-15 NOTE — PROGRESS NOTES
Tyesha Rodriguez POST OP SPINE PROGRESS NOTE    December 15, 2018  Admit Date: 2018  Admit Diagnosis: Pseudoclaudication syndrome [M48.062]  Degeneration of lumbar intervertebral disc [M51.36]  Acquired spondylolisthesis [M43.10]  Procedure: Procedure(s):  LUMBAR LAMINECTOMY L2-5, LUMBAR FUSION L4-5 (RE FLIP ROOMS)  Post Op day: 1 Day Post-Op    Subjective: Audrey Ng was seen and examined at bedside. Complains of post op pain and muscle spasms in her lumbar spine where procedure was performed. Denies radiation of pain to her legs. States preoperatively she had greater weakness in left leg vs right. Review of Systems: Pertinent items are noted in HPI. Objective:     PT/OT:   Distance Ambulated:           Time Ambulated (min):        Ambulation Response:        Assistive Device:              Assistive Device: Fall prevention device, Walker (comment)    Vital Signs:    Blood pressure 91/48, pulse 74, temperature 98.5 °F (36.9 °C), resp. rate 16, height 5' 4\" (1.626 m), weight 64.4 kg (142 lb), SpO2 96 %. Temp (24hrs), Av.6 °F (37 °C), Min:98.2 °F (36.8 °C), Max:99 °F (37.2 °C)      No intake/output data recorded.  1901 - 12/15 0700  In: 2604 [P.O.:350;  I.V.:2254]  Out: 2000 [Urine:1275; Drains:225]    LAB:    Recent Labs     12/15/18  0523   HGB 8.2*       Wound/Drain Assessment:  Drain:      Dressing:     Physical Exam:  General: no acute distress, alert, oriented x 3  LSP: dressing c/d/i  Motor  R: 5/5 IP, Quad, Hamstrings, TA, GS, EHL  L: 4/5 EHL, TA 5/5/ IP, Quad, Hamnstrings, GS  Sensation: intact to light touch and symmetrical L2-S1.  DP/PT pulses palpable 2+  SCDs on bilateral LE      Assessment:      Patient Active Problem List   Diagnosis Code    Bipolar disorder ([facility] [address] F31.9    Hyperlipidemia E78.5    Essential hypertension I10    Diverticulosis K57.90    Type 2 diabetes mellitus without complication, without long-term current use of insulin ([facility] [address] E11.9    Spinal stenosis M48.00    Thyroid nodule E04.1    Heart murmur on physical examination R01.1    Prolonged Q-T interval on ECG R94.31    Spinal stenosis, lumbar M48.061       Plan:   67 yo female s/p L2-L5 lumbar laminectomy, L4-L5 lumbar fusion POD#1  1. WBAT with assistance  2. DVT prophylaxis: SCDs bilateral LE, ambulation  3. Analgesia  4. D/C drain POD#2 when output < 30mL per shift  5. Hb 8.2 g/dL this morning, no symptoms of anemia, no tachycardia. BP 91/48 HR 74  4. PT/OT eval  5.  Plan for discharge next week       Signed By: Sandra Cohen DO

## 2018-12-15 NOTE — PROGRESS NOTES
Bedside and Verbal shift change report given to Marito Milner RN (oncoming nurse) by George Penny (offgoing nurse). Report included the following information SBAR, Kardex, OR Summary, Intake/Output and MAR.

## 2018-12-15 NOTE — PROGRESS NOTES
Dr. Dionne Angeles (on call for Dr. Kaylee Russ) notified and aware that patient's blood pressure at approximately 02:00 am was 70's/40's. RN it rechecked in a different arm and blood pressure was 91/48 with pulse rate 74. Patient's heart rate has been in the 70's-80's. Patient had a 500 ml EBL. Patient had to receive Albumin and another medication when she was in PACU to increase her blood pressure. RN suggesting a 0.9% Normal Saline 500 ml bolus x 1. Dr Eric Pak is not tachycardiac and is remaining normal. If patient is not showing any symptoms of hypovolemia, then hold off on giving a bolus right now. If patient's blood pressure drops again, then give the patient a 0.9% Normal Saline 500 ml bolus x 1. \" RN will implement Dr. Kathy Burnette orders.

## 2018-12-15 NOTE — PROGRESS NOTES
Bedside and Verbal shift change report given to [address] [place] [address]. [address] [place] [address] (oncoming nurse) by Felix Jarvis RN (offgoing nurse). Report included the following information SBAR, Kardex, OR Summary, Intake/Output and MAR.

## 2018-12-15 NOTE — PROGRESS NOTES
Problem: Self Care Deficits Care Plan (Adult)  Goal: *Acute Goals and Plan of Care (Insert Text)  Occupational Therapy Goals  Initiated 12/15/2018    1. Patient will perform lower body dressing with supervision/set-up using AE PRN within 4 days. 2.  Patient will perform toileting with supervision/set-up using most appropriate DME within 4 days. 3.  Patient will perform grooming standing at sink with supervision/set-up within 4 days. 4.  Patient will don/doff back brace at supervision/set-up within 4 days. 5.  Patient will verbalize/demonstrate 3/3 back precautions during ADL tasks without cues within 4 days. Occupational Therapy EVALUATION  Patient: Dottie Portillo (68 y.o. female)  Date: 12/15/2018  Primary Diagnosis: Pseudoclaudication syndrome [M48.062]  Degeneration of lumbar intervertebral disc [M51.36]  Acquired spondylolisthesis [M43.10]  Procedure(s) (LRB):  LUMBAR LAMINECTOMY L2-5, LUMBAR FUSION L4-5 (REQ FLIP ROOMS) (N/A) 1 Day Post-Op   Precautions: Fall, Back    ASSESSMENT :  Based on the objective data described below, the patient presents with surgical pain, impaired standing balance, impaired LB access, back precautions, and impaired activity tolerance resulting in impaired functional mobility and impaired ADL independence s/p above surgery. Patient received supine in bed, alert and agreeable to therapy. Co-treated with physical therapist to optimize patient safety and functional outcome. Patient receptive to education on back precautions and ADL/ mobility modifications. Patient instructed in logrolling and required mod-A/ verbal cues to maintain precautions. Demonstrated BUE AROM WFL sitting EOB, performed 5 reps shoulder flexion and 5 reps shoulder abduction to promote functional AROM and to improve stiffness. Required total-A to don TLSO sitting EOB.   Performed sit-stand with mod-A and required min-A/ HHA x2 to pivot to chair with flexed posture and unable to fully extend BLE due to tight hamstrings, reports ambulating like this prior to surgery. Patient unable to tailor sit for LB access. Infer patient currently requires set-up to min-A for UB ADLs, mod-A for bathing, and max-A to total-A for LB ADLs. VSS throughout evaluation. PTA, patient living alone, reports functional decline/ increased back pain for last 6 months but still ambulating household distances with 3 pronged cane and performing ADLs at mod-I except son assisting with tub transfer. Discharge recommendation TBD pending progress. Patient will benefit from skilled intervention to address the above impairments. Patients rehabilitation potential is considered to be Good  Factors which may influence rehabilitation potential include:   []             None noted  []             Mental ability/status  []             Medical condition  []             Home/family situation and support systems  []             Safety awareness  [x]             Pain tolerance/management  []             Other:      PLAN :  Recommendations and Planned Interventions:  [x]               Self Care Training                  [x]        Therapeutic Activities  [x]               Functional Mobility Training    []        Cognitive Retraining  [x]               Therapeutic Exercises           [x]        Endurance Activities  [x]               Balance Training                   []        Neuromuscular Re-Education  []               Visual/Perceptual Training     [x]   Home Safety Training  [x]               Patient Education                 [x]        Family Training/Education  []               Other (comment):    Frequency/Duration: Patient will be followed by occupational therapy 5 times a week to address goals. Discharge Recommendations: To Be Determined, Rehab vs. Home health  Further Equipment Recommendations for Discharge: TBD     SUBJECTIVE:   Patient stated It feels good to sit up.     OBJECTIVE DATA SUMMARY:   HISTORY:   Past Medical History: Diagnosis Date    Anxiety     Arrhythmia     PROLONGED QT EKG 7-26-18    Arthritis     Bipolar 1 disorder [facility])     son not sure where patient was diagnosed(PT. DENIES 12-7-18)    Chronic pain     Diabetes ([facility] [address]     Diverticulosis of large intestine 2018    sigmoid & descending    Heart murmur     Hypertension     HX HTN - NO MEDS CURRENTLY(12-7-18)    Prolonged Q-T interval on ECG 12/10/2018    Thyroid disease     GOITER     Past Surgical History:   Procedure Laterality Date    HX HYSTERECTOMY  1986    HX OPEN CHOLECYSTECTOMY  1986    3 months after childbirth    HX TOTAL VAGINAL HYSTERECTOMY  1986    ovaries out also - heavy bleeding        Prior Level of Function/Environment/Context: PTA, patient living alone, reports functional decline/ increased back pain for last 6 months but still ambulating household distances with 3 pronged cane and performing ADLs at [facility] except son assisting with tub transfer. Expanded or extensive additional review of patient history:     Home Situation  # Steps to Enter: 0  One/Two Story Residence: One story  Living Alone: No  Support Systems: Family member(s)  Patient Expects to be Discharged to[de-identified] Apartment  Current DME Used/Available at Home: Walker, rolling(3 prong cane)    Hand dominance: Right    EXAMINATION OF PERFORMANCE DEFICITS:  Cognitive/Behavioral Status:  Neurologic State: Alert; Appropriate for age  Orientation Level: Oriented X4  Cognition: Appropriate decision making; Appropriate for age attention/concentration  Perception: Appears intact  Perseveration: No perseveration noted  Safety/Judgement: Awareness of environment; Fall prevention    Skin: dressing saturated, RN changed    Edema: none noted    Hearing: WDL   Vision/Perceptual:                           Acuity: Within Defined Limits;Able to read clock/calendar on wall without difficulty; Able to read employee name badge without difficulty    Corrective Lenses: Glasses    Range of Motion:    AROM: (BUE WFL, BLE decreased/ tight hamstrings)  PROM: Generally decreased, functional(decreased knee extension bilaterally L>R, tight hamstrings)                      Strength:    Strength: Generally decreased, functional                Coordination:  Coordination: Generally decreased, functional  Fine Motor Skills-Upper: Left Intact; Right Intact    Gross Motor Skills-Upper: Left Intact; Right Intact    Tone & Sensation:    Tone: Normal  Sensation: Intact                      Balance:  Sitting: Intact  Standing: Impaired; With support  Standing - Static: Fair  Standing - Dynamic : Poor    Functional Mobility and Transfers for ADLs:  Bed Mobility:  Rolling: Minimum assistance  Supine to Sit: Moderate assistance  Scooting: Contact guard assistance    Transfers:  Sit to Stand: Moderate assistance  Stand to Sit: Moderate assistance    ADL Assessment:  Feeding: Independent(inferred)    Oral Facial Hygiene/Grooming: Setup(inferred)    Bathing: Moderate assistance(inferred due to back precautions/ impaired LB access)    Upper Body Dressing: Minimum assistance(inferred due to pain)    Lower Body Dressing: Total assistance(for socks, unable to tailor sit, impaired LB access)    Toileting: Maximum assistance(inferred due to impaired LB access, back precautions)                ADL Intervention and task modifications:               Cognitive Retraining  Safety/Judgement: Awareness of environment; Fall prevention    Therapeutic Exercise:  Demonstrated BUE AROM WFL sitting EOB, performed 5 reps shoulder flexion and 5 reps shoulder abduction to promote functional AROM and to improve stiffness.   Functional Measure:  Barthel Index:    Bathin  Bladder: 0  Bowels: 5  Groomin  Dressin  Feeding: 10  Mobility: 0  Stairs: 0  Toilet Use: 5  Transfer (Bed to Chair and Back): 5  Total: 35       Barthel and G-code impairment scale:  Percentage of impairment CH  0% CI  1-19% CJ  20-39% CK  40-59% CL  60-79% CM  80-99% CN  100%   Barthel Score 0-100 100 99-80 79-60 59-40 20-39 1-19   0   Barthel Score 0-20 20 17-19 13-16 9-12 5-8 1-4 0      The Barthel ADL Index: Guidelines  1. The index should be used as a record of what a patient does, not as a record of what a patient could do. 2. The main aim is to establish degree of independence from any help, physical or verbal, however minor and for whatever reason. 3. The need for supervision renders the patient not independent. 4. A patient's performance should be established using the best available evidence. Asking the patient, friends/relatives and nurses are the usual sources, but direct observation and common sense are also important. However direct testing is not needed. 5. Usually the patient's performance over the preceding 24-48 hours is important, but occasionally longer periods will be relevant. 6. Middle categories imply that the patient supplies over 50 per cent of the effort. 7. Use of aids to be independent is allowed. Paco Casper., Barthel, DMARCOS. (1732). Functional evaluation: the Barthel Index. [address] [facility] (14)2. Gianna Jensen stephanie AV [place], Verónica Paul., Emily De La Rosa., [place], [facility] (1999). Measuring the change indisability after inpatient rehabilitation; comparison of the responsiveness of the Barthel Index and Functional [place] Measure. Journal of Neurology, Neurosurgery, and Psychiatry, 66(4), 742-755. Michele Dash, N.J.A, Ben Richmond,  W.J.TELMA, & Simi Sanders, M.A. (2004.) Assessment of post-stroke quality of life in cost-effectiveness studies: The usefulness of the Barthel Index and the EuroQoL-5D. Quality of Life Research, 13, 133-42       G codes: In compliance with CMSs Claims Based Outcome Reporting, the following G-code set was chosen for this patient based on their primary functional limitation being treated:     The outcome measure chosen to determine the severity of the functional limitation was the Barthel Index with a score of 35/100 which was correlated with the impairment scale. ? Self Care:     - CURRENT STATUS: CL - 60%-79% impaired, limited or restricted    - GOAL STATUS: CI - 1%-19% impaired, limited or restricted    - D/C STATUS:  ---------------To be determined---------------     Occupational Therapy Evaluation Charge Determination   History Examination Decision-Making   LOW Complexity : Brief history review  LOW Complexity : 1-3 performance deficits relating to physical, cognitive , or psychosocial skils that result in activity limitations and / or participation restrictions  MEDIUM Complexity : Patient may present with comorbidities that affect occupational performnce. Miniml to moderate modification of tasks or assistance (eg, physical or verbal ) with assesment(s) is necessary to enable patient to complete evaluation       Based on the above components, the patient evaluation is determined to be of the following complexity level: LOW   Pain:  Pain Scale 1: Numeric (0 - 10)  Pain Intensity 1: 7  Pain Location 1: Back;Hip;Leg;Spine, lumbar  Pain Orientation 1: Lower; Posterior;Left;Right  Pain Description 1: Cramping;Aching;Radiating  Pain Intervention(s) 1: Medication (see MAR)  Activity Tolerance:    12/15/18 1258 12/15/18 1309 12/15/18 1324   BP: 106/66 137/71 125/65   BP 1 Location: Right arm Right arm Right arm   BP Patient Position: At rest;Supine Sitting;During activity During activity; Sitting   Pulse: 80 84 87     Please refer to the flowsheet for vital signs taken during this treatment. After treatment:   [x] Patient left in no apparent distress sitting up in chair  [] Patient left in no apparent distress in bed  [x] Call bell left within reach  [x] Nursing notified  [] Caregiver present  [] Bed alarm activated    COMMUNICATION/EDUCATION:   The patients plan of care was discussed with: Physical Therapist and Registered Nurse. [x] Home safety education was provided and the patient/caregiver indicated understanding.   [x] Patient/family have participated as able in goal setting and plan of care. [x] Patient/family agree to work toward stated goals and plan of care. [] Patient understands intent and goals of therapy, but is neutral about his/her participation. [] Patient is unable to participate in goal setting and plan of care. This patients plan of care is appropriate for delegation to DEMETRIS.     Thank you for this referral.  Jose M Thomas OT  Time Calculation: 41 mins

## 2018-12-15 NOTE — PROGRESS NOTES
Reason for Admission:   LUMBAR LAMINECTOMY L2-5, LUMBAR FUSION L4-5                  RRAT Score:    13              Do you (patient/family) have any concerns for transition/discharge? Plan for utilizing home health:     TBD    Likelihood of readmission? Mod based on RRAT             Transition of Care Plan: Haroon Vcikers is POD 1 of a lumbar morales due to degeneration of lumbar intervertebral disc,acquired spondylolisthesis. Patient stated she was not walking a lot but was using a cane. She lives alone and her son, Jeremie Rivero helps get her in/out of the car. Patient has no DME at home other than a cane. There is no plan for support in home post discharge as her son just has a new baby. Await therapy recommendations as patient may need rehab. Care Management Interventions  PCP Verified by CM:  Yes  Discharge Durable Medical Equipment: (Owns a cane)  Physical Therapy Consult: Yes  Occupational Therapy Consult: Yes  Current Support Network: Lives Alone(In 1 level apt)  Plan discussed with Pt/Family/Caregiver: Yes  [phone]

## 2018-12-16 LAB — HGB BLD-MCNC: 9.7 G/DL (ref 11.5–16)

## 2018-12-16 PROCEDURE — 74011000250 HC RX REV CODE- 250: Performed by: PHYSICIAN ASSISTANT

## 2018-12-16 PROCEDURE — 97116 GAIT TRAINING THERAPY: CPT

## 2018-12-16 PROCEDURE — 36415 COLL VENOUS BLD VENIPUNCTURE: CPT

## 2018-12-16 PROCEDURE — 65270000029 HC RM PRIVATE

## 2018-12-16 PROCEDURE — 85018 HEMOGLOBIN: CPT

## 2018-12-16 PROCEDURE — 74011250637 HC RX REV CODE- 250/637: Performed by: PHYSICIAN ASSISTANT

## 2018-12-16 PROCEDURE — 94760 N-INVAS EAR/PLS OXIMETRY 1: CPT

## 2018-12-16 RX ADMIN — OXYCODONE HYDROCHLORIDE 10 MG: 5 TABLET ORAL at 06:14

## 2018-12-16 RX ADMIN — DIAZEPAM 5 MG: 5 TABLET ORAL at 09:52

## 2018-12-16 RX ADMIN — ACETAMINOPHEN 1000 MG: 500 TABLET ORAL at 00:36

## 2018-12-16 RX ADMIN — OXYCODONE HYDROCHLORIDE 10 MG: 5 TABLET ORAL at 13:26

## 2018-12-16 RX ADMIN — OXYCODONE HYDROCHLORIDE 10 MG: 5 TABLET ORAL at 20:39

## 2018-12-16 RX ADMIN — POLYETHYLENE GLYCOL 3350 17 G: 17 POWDER, FOR SOLUTION ORAL at 09:52

## 2018-12-16 RX ADMIN — DIAZEPAM 5 MG: 5 TABLET ORAL at 00:36

## 2018-12-16 RX ADMIN — DIAZEPAM 5 MG: 5 TABLET ORAL at 18:59

## 2018-12-16 RX ADMIN — ACETAMINOPHEN 1000 MG: 500 TABLET ORAL at 18:59

## 2018-12-16 RX ADMIN — OXYCODONE HYDROCHLORIDE 10 MG: 5 TABLET ORAL at 09:52

## 2018-12-16 RX ADMIN — STANDARDIZED SENNA CONCENTRATE AND DOCUSATE SODIUM 1 TABLET: 8.6; 5 TABLET ORAL at 09:52

## 2018-12-16 RX ADMIN — ACETAMINOPHEN 1000 MG: 500 TABLET ORAL at 06:14

## 2018-12-16 RX ADMIN — OXYCODONE HYDROCHLORIDE 10 MG: 5 TABLET ORAL at 16:45

## 2018-12-16 RX ADMIN — Medication 10 ML: at 13:39

## 2018-12-16 RX ADMIN — ACETAMINOPHEN 1000 MG: 500 TABLET ORAL at 13:26

## 2018-12-16 RX ADMIN — STANDARDIZED SENNA CONCENTRATE AND DOCUSATE SODIUM 1 TABLET: 8.6; 5 TABLET ORAL at 18:59

## 2018-12-16 RX ADMIN — OXYCODONE HYDROCHLORIDE 10 MG: 5 TABLET ORAL at 00:36

## 2018-12-16 NOTE — PROGRESS NOTES
Problem: Mobility Impaired (Adult and Pediatric)  Goal: *Acute Goals and Plan of Care (Insert Text)  Physical Therapy Goals  Initiated 12/15/2018    1. Patient will move from supine to sit and sit to supine  in bed with modified independence within 4 days. 2. Patient will perform sit to stand with modified independence within 4 days. 3. Patient will ambulate with modified independence for 150 feet with the least restrictive device within 4 days. 4. Patient will ascend/descend 1 stairs with 1 handrail(s) with minimal assistance/contact guard assist within 4 days. 5. Patient will verbalize and demonstrate understanding of spinal precautions (No bending, lifting greater than 5 lbs, or twisting; log-roll technique; frequent repositioning as instructed) within 4 days. physical Therapy TREATMENT  Patient: Venice Fraser (68 y.o. female)  Date: 12/16/2018  Diagnosis: Pseudoclaudication syndrome [M48.062]  Degeneration of lumbar intervertebral disc [M51.36]  Acquired spondylolisthesis [M43.10] <principal problem not specified>  Procedure(s) (LRB):  LUMBAR LAMINECTOMY L2-5, LUMBAR FUSION L4-5 (REQ FLIP ROOMS) (N/A) 2 Days Post-Op  Precautions: Fall, Back  Chart, physical therapy assessment, plan of care and goals were reviewed. ASSESSMENT: Received in supine in bed. Patient reports sitting up x 2 hours yesterday without position change. PT recommending hourly position change with staff assistance if necessary. Patient able to roll L max A x 1; L sidelying to sit max A x2; sit to stand to RW mod A x2. Total A donning TLSO; Reviewed precautions patient able to report 3/3. Stands with flexed knees/hips and trunk (reports baseline). Patient able to ambulate 10' with RW verbal cues for RW sequencing and management. Returned to bedside chair with call bell and instructed only up with staff. Patient indicated understanding.   Most likely will need  rehab as she lives alone and patient indicates understanding and is in agreement. Progression toward goals:  []      Improving appropriately and progressing toward goals  [x]      Improving slowly and progressing toward goals  []      Not making progress toward goals and plan of care will be adjusted     PLAN:  Patient continues to benefit from skilled intervention to address the above impairments. Continue treatment per established plan of care. Discharge Recommendations:  Rehab  Further Equipment Recommendations for Discharge:  TBD     SUBJECTIVE:   Patient stated my hamstrings are tight   The patient stated 3/3 back precautions. Reviewed all 3 with patient. OBJECTIVE DATA SUMMARY:   Critical Behavior:  Neurologic State: Alert  Orientation Level: Oriented X4  Cognition: Appropriate safety awareness  Safety/Judgement: Awareness of environment, Fall prevention  Functional Mobility Training:  Bed Mobility:  Log Rolling: Maximum assistance;Assist x1  Supine to Sit: Maximum assistance;Assist x2     Scooting: Contact guard assistance        Brace donned with  total assistance   Transfers:  Sit to Stand: Moderate assistance;Assist x2  Stand to Sit: Minimum assistance;Assist x2                             Balance:  Sitting: Intact; With support  Standing: Impaired; With support  Standing - Static: Fair  Standing - Dynamic : Poor  Ambulation/Gait Training:  Distance (ft): 10 Feet (ft)  Assistive Device: Gait belt;Walker, rolling  Ambulation - Level of Assistance: Moderate assistance;Assist x2; Additional time; Adaptive equipment        Gait Abnormalities: Altered arm swing; Step to gait; Decreased step clearance(flexed hip knees)        Base of Support: Narrowed     Speed/Hetal: Slow                      Stairs:NT           Therapeutic Exercises:    Ankle pumps x 10; seated LAQ x 5 each LE  Pain:  Pain Scale 1: Numeric (0 - 10)  Pain Intensity 1: 7  Pain Location 1: Back  Pain Orientation 1: Lower  Pain Description 1: Aching;Cramping  Pain Intervention(s) 1: Medication (see MAR)  Activity Tolerance:   See above  Please refer to the flowsheet for vital signs taken during this treatment.   After treatment:   [x]  Patient left in no apparent distress sitting up in chair  []  Patient left in no apparent distress in bed  [x]  Call bell left within reach  [x]  Nursing notified  []  Caregiver present  []  Bed alarm activated    COMMUNICATION/COLLABORATION:   The patients plan of care was discussed with: Registered Nurse    Chema Loo DPT   Time Calculation: 15 mins

## 2018-12-16 NOTE — PROGRESS NOTES
Bedside and Verbal shift change report given to Chepe Springer (oncoming nurse) by Jane Williamson (offgoing nurse). Report included the following information SBAR.        Off going nurse and patient verbalized urine output after drummond removal.

## 2018-12-16 NOTE — PROGRESS NOTES
ORTHO POST OP SPINE PROGRESS NOTE    2018  Admit Date: 2018  Admit Diagnosis: Pseudoclaudication syndrome [M48.062]  Degeneration of lumbar intervertebral disc [M51.36]  Acquired spondylolisthesis [M43.10]  Procedure: Procedure(s):  LUMBAR LAMINECTOMY L2-5, LUMBAR FUSION L4-5 (RE FLIP ROOMS)  Post Op day: 2 Days Post-Op    Subjective: Italia Perez was seen and examined at bedside. She is feeling much better today. She has been OOB to chair and has ambulated to the bathroom with a walker. She does continue to have pain at her surgical site at her low back. Review of Systems: Pertinent items are noted in HPI. Objective:     PT/OT:      Assistive Device:              Assistive Device: Fall prevention device    Vital Signs:    Blood pressure 114/64, pulse 83, temperature 99.4 °F (37.4 °C), resp. rate 16, height 5' 4\" (1.626 m), weight 64.4 kg (142 lb), SpO2 95 %. Temp (24hrs), Av.2 °F (37.3 °C), Min:98.7 °F (37.1 °C), Max:99.5 °F (37.5 °C)      No intake/output data recorded.  1901 -  0700  In: 2554 [P.O.:300; I.V.:2254]  Out: 2550 [Urine:2350; Drains:200]    LAB:    Recent Labs     18  0211   HGB 9.7*       Physical Exam:  General: no acute distress, alert, oriented x 3.  Seated in chair  BL LE:  Motor  R: 5/5 IP, Quad, Hamstrings, TA, GS, EHL  L: 4/5 EHL, TA 5/5/ IP, Quad, Hamnstrings, GS  Sensation: intact to light touch and symmetrical L2-S1.  DP/PT pulses palpable 2+  GERRI stockings on bilateral LE        Assessment:      Patient Active Problem List   Diagnosis Code    Bipolar disorder ([facility] [address] F31.9    Hyperlipidemia E78.5    Essential hypertension I10    Diverticulosis K57.90    Type 2 diabetes mellitus without complication, without long-term current use of insulin ([facility] [address] E11.9    Spinal stenosis M48.00    Thyroid nodule E04.1    Heart murmur on physical examination R01.1    Prolonged Q-T interval on ECG R94.31    Spinal stenosis, lumbar M48.061 Plan:     67 yo female s/p L2-L5 lumbar laminectomy, L4-L5 lumbar fusion POD#2  1. WBAT with assistance in TLSO  2. DVT prophylaxis: SCDs bilateral LE, ambulation  3. Analgesia  5. Hb 9.7 g/dL this morning  4. continue PT/OT - ambulated 10ft today with PT   5.  Plan for discharge next week           Signed By: Graciela Gaspar DO

## 2018-12-17 ENCOUNTER — TELEPHONE (OUTPATIENT)
Dept: INTERNAL MEDICINE CLINIC | Age: 73
End: 2018-12-17

## 2018-12-17 DIAGNOSIS — I34.81 MITRAL VALVE ANNULAR CALCIFICATION: Primary | ICD-10-CM

## 2018-12-17 PROCEDURE — 97535 SELF CARE MNGMENT TRAINING: CPT

## 2018-12-17 PROCEDURE — 97116 GAIT TRAINING THERAPY: CPT

## 2018-12-17 PROCEDURE — 74011250637 HC RX REV CODE- 250/637: Performed by: PHYSICIAN ASSISTANT

## 2018-12-17 PROCEDURE — 97530 THERAPEUTIC ACTIVITIES: CPT

## 2018-12-17 PROCEDURE — 65270000029 HC RM PRIVATE

## 2018-12-17 RX ORDER — KETOROLAC TROMETHAMINE 10 MG/1
10 TABLET, FILM COATED ORAL
Status: DISCONTINUED | OUTPATIENT
Start: 2018-12-17 | End: 2018-12-18 | Stop reason: HOSPADM

## 2018-12-17 RX ADMIN — OXYCODONE HYDROCHLORIDE 10 MG: 5 TABLET ORAL at 06:44

## 2018-12-17 RX ADMIN — ACETAMINOPHEN 1000 MG: 500 TABLET ORAL at 01:06

## 2018-12-17 RX ADMIN — STANDARDIZED SENNA CONCENTRATE AND DOCUSATE SODIUM 1 TABLET: 8.6; 5 TABLET ORAL at 18:52

## 2018-12-17 RX ADMIN — ACETAMINOPHEN 1000 MG: 500 TABLET ORAL at 23:38

## 2018-12-17 RX ADMIN — OXYCODONE HYDROCHLORIDE 10 MG: 5 TABLET ORAL at 23:38

## 2018-12-17 RX ADMIN — OXYCODONE HYDROCHLORIDE 10 MG: 5 TABLET ORAL at 16:34

## 2018-12-17 RX ADMIN — DIAZEPAM 5 MG: 5 TABLET ORAL at 04:40

## 2018-12-17 RX ADMIN — ACETAMINOPHEN 1000 MG: 500 TABLET ORAL at 06:44

## 2018-12-17 RX ADMIN — STANDARDIZED SENNA CONCENTRATE AND DOCUSATE SODIUM 1 TABLET: 8.6; 5 TABLET ORAL at 09:52

## 2018-12-17 RX ADMIN — OXYCODONE HYDROCHLORIDE 10 MG: 5 TABLET ORAL at 01:06

## 2018-12-17 RX ADMIN — OXYCODONE HYDROCHLORIDE 10 MG: 5 TABLET ORAL at 12:58

## 2018-12-17 RX ADMIN — ACETAMINOPHEN 1000 MG: 500 TABLET ORAL at 18:53

## 2018-12-17 RX ADMIN — DIAZEPAM 5 MG: 5 TABLET ORAL at 12:58

## 2018-12-17 RX ADMIN — OXYCODONE HYDROCHLORIDE 10 MG: 5 TABLET ORAL at 09:52

## 2018-12-17 RX ADMIN — Medication 10 ML: at 06:48

## 2018-12-17 RX ADMIN — OXYCODONE HYDROCHLORIDE 5 MG: 5 TABLET ORAL at 19:31

## 2018-12-17 RX ADMIN — Medication 10 ML: at 23:38

## 2018-12-17 NOTE — PROGRESS NOTES
Orthopedic Spine Progress Note  Post Op day: 3 Days Post-Op    2018 7:51 AM   Admit Date: 2018  Procedure: Procedure(s):  LUMBAR LAMINECTOMY L2-5, LUMBAR FUSION L4-5 (REQ FLIP ROOMS)    Subjective: [place] appears well. Pain is not managed this morning. She ambulated a short distance with PT yesterday   Tolerating diet  No N/V  Ricketts in place    Pain Control:   Pain Assessment  Pain Scale 1: Numeric (0 - 10)  Pain Intensity 1: 7  Pain Onset 1: S/P surgical pain&related pain. Pain Location 1: Back, Incisional, Spine, lumbar, Hip, Leg  Pain Orientation 1: Lower, Posterior, Left, Right(Low back & spasms/cramps in hips&legs bilat.)  Pain Description 1: Aching, Cramping  Pain Intervention(s) 1: Medication (see MAR), Distraction, Rest, Repositioned    Objective:          Physical Exam:  General:  Alert and oriented. No acute distress. Heart:  Respirations unlabored. Abdomen:   Extremities: Soft, non-tender. No evidence of cyanosis. Pulses palpable in both upper and lower extremities. Neurologic:  Musculoskeletal:  No new motor deficits. Neurovascular exam within normal limits. Sensation stable. Motor: unchanged C5-T1 and L2-S1. Tricia's sign negative in bilateral lower extremities. Calves soft, nontender upon palpation and with passive twitch. Moves both upper and lower extremities. Incision: clean, dry, and intact. No significant erythema or swelling. No active drainage noted. Vital Signs:    Blood pressure 109/64, pulse 81, temperature 98.4 °F (36.9 °C), resp. rate 15, height 5' 4\" (1.626 m), weight 64.4 kg (142 lb), SpO2 95 %.   Temp (24hrs), Av.1 °F (37.3 °C), Min:98.4 °F (36.9 °C), Max:99.4 °F (37.4 °C)      LAB:    Recent Labs     18  0211   HGB 9.7*     Lab Results   Component Value Date/Time    Sodium 144 12/15/2018 05:23 AM    Potassium 3.6 12/15/2018 05:23 AM    Chloride 114 (H) 12/15/2018 05:23 AM    CO2 23 12/15/2018 05:23 AM    Glucose 121 (H) 12/15/2018 05:23 AM    Glucose 114 (H) 07/27/2018 06:00 AM    BUN 15 12/15/2018 05:23 AM    Creatinine 0.60 12/15/2018 05:23 AM    Calcium 8.2 (L) 12/15/2018 05:23 AM       Intake/Output:No intake/output data recorded. 12/15 1901 - 12/17 0700  In: -   Out: 700 [Urine:700]    PT/OT:   Gait:  Gait  Base of Support: Narrowed  Speed/Hetal: Slow  Gait Abnormalities: Altered arm swing, Step to gait, Decreased step clearance(flexed hip knees)  Ambulation - Level of Assistance: Moderate assistance, Assist x2, Additional time, Adaptive equipment  Distance (ft): 10 Feet (ft)  Assistive Device: Gait belt, Walker, rolling                 Assessment:   Patient is 3 Days Post-Op s/p Procedure(s):  LUMBAR LAMINECTOMY L2-5, LUMBAR FUSION L4-5 (REQ FLIP ROOMS)    Plan:     1. Continue PT/OT  2. Continue established methods of pain control  3. VTE Prophylaxes - TEDS & SCDs   4. Encouraged use of ICS  5. Remove Ricketts once mobilizing with PT  6. Add toradol for pain control  7.   Discharge to rehab pending    Signed By: Tami Cox PA-C

## 2018-12-17 NOTE — TELEPHONE ENCOUNTER
Called patient regarding echocardiogram    Is currently in the hospital following lumbar laminectomy. Planning on discharge to SNF    Mitral valve with moderate annular calcification, reviewed with patient.   Referral to cardiology given, pt to schedule    Pt verbalized understanding of results and plan    Melav Urrutia NP

## 2018-12-17 NOTE — PROGRESS NOTES
Reviewed medical chart. Patient was admitted to [facility] due to Lumbar Laminectomy L2-5, Lumbar Fusion L4-5. Patient lives alone in a private residence but her son, Patsy Lau lives nearby. Met with patient at bedside to introduce self and discuss transitions of care. Patient was alert and oriented. PT/OT recommended Inpatient Rehab. Offered freedom of choice. Patient selected [facility]. Sent referral via [facility]; Awaiting response. Spoke with patient's son, Patsy Lau and his wife, Barber López to confirm transitions of care. Family is in agreement with plan. CM will continue to follow.    JE Walsh,CRM

## 2018-12-17 NOTE — PROGRESS NOTES
Problem: Self Care Deficits Care Plan (Adult)  Goal: *Acute Goals and Plan of Care (Insert Text)  Occupational Therapy Goals  Initiated 12/15/2018    1. Patient will perform lower body dressing with supervision/set-up using AE PRN within 4 days. 2.  Patient will perform toileting with supervision/set-up using most appropriate DME within 4 days. 3.  Patient will perform grooming standing at sink with supervision/set-up within 4 days. 4.  Patient will don/doff back brace at supervision/set-up within 4 days. 5.  Patient will verbalize/demonstrate 3/3 back precautions during ADL tasks without cues within 4 days. Occupational Therapy TREATMENT  Patient: Sav Jackson (68 y.o. female)  Date: 12/17/2018  Diagnosis: Pseudoclaudication syndrome [M48.062]  Degeneration of lumbar intervertebral disc [M51.36]  Acquired spondylolisthesis [M43.10] <principal problem not specified>  Procedure(s) (LRB):  LUMBAR LAMINECTOMY L2-5, LUMBAR FUSION L4-5 (REQ FLIP ROOMS) (N/A) 3 Days Post-Op  Precautions: Fall, Back No bending, no lifting greater than 5 lbs, no twisting, log-roll technique, repositioning every 20-30 min except when sleeping, TLSO brace when OOB  Chart, occupational therapy assessment, plan of care, and goals were reviewed. ASSESSMENT: Seen in co treat with PT to maximize mobility and safety. Patient received in chair and wearing her TLSO. She is able to recall 3/3 back precautions. Reviewed application of precautions to mobility. Patient reports pain at an 8/10. Patient participated in adjustment of TLSO with total assist. She used RW and min assist of 2 to ambulate short distance (see PT note) then returned to chair. Once seated, patient participated in instruction in use of reacher for LE dressing with need for max to total assist. Noted that patient demonstrates and reports hypersensitivity to touch at bilateral LEs with resulting increased pain and spasms.  Performance impacted by impaired cognition (problem solving, insight, deductive reasoning), impaired reach to LEs, impaired standing posture (flexed at knees and trunk), impaired standing balance, hypersensitivity to touch of bilateral LEs. Patient reports 6 month history of decline in mobiltiy and self care with increased pain in back and LEs. Per chart, patient living alone and Mod I with 3 pronged cane, but patient reports assist from son for mobility and self care. Recommend Inpatient rehab. Progression toward goals:  []          Improving appropriately and progressing toward goals  [x]          Improving slowly and progressing toward goals  []          Not making progress toward goals and plan of care will be adjusted     PLAN:  Patient continues to benefit from skilled intervention to address the above impairments. Continue treatment per established plan of care. Discharge Recommendations:  Inpatient Rehab  Further Equipment Recommendations for Discharge:  none     SUBJECTIVE:   Patient stated my son has been helping me.   The patient stated 3/3 back precautions. Reviewed all 3 with patient. OBJECTIVE DATA SUMMARY:   Cognitive/Behavioral Status:  Neurologic State: Alert  Orientation Level: Oriented X4  Cognition: (impaired problem solving)  Safety/Judgement: Awareness of environment    Functional Mobility and Transfers for ADLs:  Bed Mobility:       Transfers:  Sit to Stand: Minimum assistance;Assist x2  Functional Transfers  Toilet Transfer : Minimum assistance;Assist x1(inferred from mobiltiy and transfer to standard chair)  Adaptive Equipment: Bedside commode;Walker (comment)   Bed to Chair: Assist x1    Balance:  Sitting: Intact  Standing: Impaired; With support  Standing - Static: Fair  Standing - Dynamic : Fair    ADL Intervention and Instruction:            Upper Body Bathing  Bathing Assistance: Supervision/set-up(in simulation)  Position Performed: Seated in chair  Cues: Verbal cues provided  Adaptive Equipment: (instructed in use for LE bathing)    Lower Body Bathing  Perineal  : Total assistance (dependent)(inferred from mobility)  Position Performed: Standing  Lower Body : Maximum assistance  Position Performed: Seated in chair  Adaptive Equipment: Long handled sponge(instructed in use)    Upper Body Dressing Assistance  Orthotics(Brace): Total assistance (dependent)(TLSO)    Lower Body Dressing Assistance  Pants With Elastic Waist: Total assistance(dependent)  Leg Crossed Method Used: No  Position Performed: Seated in chair;Standing  Adaptive Equipment Used: Walker;Reacher(instructed in and practiced use of reacher)         Cognitive Retraining  Problem Solving: Identifying the problem;Deductive reason  Organizing/Sequencing: Breaking task down  Attention to Task: Distractibility  Following Commands: Follows one step commands/directions  Safety/Judgement: Awareness of environment  Dressing brace: Patient instructed and demonstrated to don/doff velcro on brace using dominant side, keeping non-dominant side intact. Dressing lower body: Patient instructed to don brace first and on the benefits to remain seated to don all clothing to increase independence with precautions and pain management. Patient instructed and indicated understanding the benefits of maintaining activity tolerance, functional mobility, and independence with self care tasks during acute stay  to ensure safe return home and to baseline. Encouraged patient to increase frequency and duration OOB, not sitting longer than 30 mins without marching/walking with staff, be out of bed for all meals, perform daily ADLs (as approved by RN/MD regarding bathing etc), and performing functional mobility to/from bathroom. Patient instruction and indicated understanding on body mechanics, ergonomics and gravitational force on the spine during different body positions to plan activities in prep for return home to complete instrumental ADLs and back to work safely.      Activity Tolerance: Fair  Please refer to the flowsheet for vital signs taken during this treatment.   After treatment:   [x] Patient left in no apparent distress sitting up in chair  [] Patient left in no apparent distress in bed  [x] Call bell left within reach  [x] Nursing notified  [] Caregiver present  [] Bed alarm activated    COMMUNICATION/COLLABORATION:   The patients plan of care was discussed with: Physical Therapist and Registered Nurse    LOY Mary  Time Calculation: 27 mins

## 2018-12-17 NOTE — PROGRESS NOTES
Problem: Mobility Impaired (Adult and Pediatric)  Goal: *Acute Goals and Plan of Care (Insert Text)  Physical Therapy Goals  Initiated 12/15/2018    1. Patient will move from supine to sit and sit to supine  in bed with modified independence within 4 days. 2. Patient will perform sit to stand with modified independence within 4 days. 3. Patient will ambulate with modified independence for 150 feet with the least restrictive device within 4 days. 4. Patient will ascend/descend 1 stairs with 1 handrail(s) with minimal assistance/contact guard assist within 4 days. 5. Patient will verbalize and demonstrate understanding of spinal precautions (No bending, lifting greater than 5 lbs, or twisting; log-roll technique; frequent repositioning as instructed) within 4 days. physical Therapy TREATMENT  Patient: Audrey Ng (68 y.o. female)  Date: 12/17/2018  Diagnosis: Pseudoclaudication syndrome [M48.062]  Degeneration of lumbar intervertebral disc [M51.36]  Acquired spondylolisthesis [M43.10] <principal problem not specified>  Procedure(s) (LRB):  LUMBAR LAMINECTOMY L2-5, LUMBAR FUSION L4-5 (REQ FLIP ROOMS) (N/A) 3 Days Post-Op  Precautions: Fall, Back,No bending, no lifting greater than 5 lbs, no twisting, log-roll technique, repositioning every 20-30 min except when sleeping, brace when OOB    Chart, physical therapy assessment, plan of care and goals were reviewed.     ASSESSMENT:  Pt received up in chair with TLSO and agreeable to participate with therapy, sit to stand with minimal assist x 2, ambulated with rolling walker x 20 feet with excessively flexed posture of trunk and knees, decreased step clearance, and slow pace, pt reports her gait has been progressively declining in the last 6 months and had a flexed posture prior to surgery due to pain in back and legs, pt reports she used a quad cane within her home and a rollator in the community(reports her son pushed her around in rollator), she tolerated increased ambulation distance this date however reports high pain levels, 8/10, pt requested to remain up in chair at end of session, readjusted loose fitting TLSO with total assistance, pt recalled 2 of 3 back precautions, these were reviewed with pt and reminded pt of the sitting restrictions, recommend inpatient rehab to maximize strength and safe, functional mobility    Progression toward goals:  []      Improving appropriately and progressing toward goals  [x]      Improving slowly and progressing toward goals  []      Not making progress toward goals and plan of care will be adjusted     PLAN:  Patient continues to benefit from skilled intervention to address the above impairments. Continue treatment per established plan of care. Discharge Recommendations:  Inpatient Rehab  Further Equipment Recommendations for Discharge: To be determined      SUBJECTIVE:   Patient stated I am not feeling as good as yesterday.   Pt received up in chair. Reports she rather sit in chair as she does not like to stay in the bed. The patient stated 2/3 back precautions. Reviewed all 3 with patient. OBJECTIVE DATA SUMMARY:   Critical Behavior:  Neurologic State: Alert  Orientation Level: Oriented X4  Cognition: Appropriate decision making  Safety/Judgement: Awareness of environment, decreased insight into deficits   Functional Mobility Training:  Bed Mobility:    not assessed, OOB in chair with TLSO, readjusted TLSO due to loose fit and sliding up too high                Transfers:  Sit to Stand: Minimum assistance;Assist x2, stands with excessively flexed posture  Stand to Sit: Contact guard assistance;Assist x1                          Balance:  Sitting: Intact  Standing: Impaired; With support  Standing - Static: Fair  Standing - Dynamic : Fair  Ambulation/Gait Training:  Distance (ft): 20 Feet (ft)  Assistive Device: Gait belt;Walker, rolling;Brace/Splint  Ambulation - Level of Assistance: Assist x1;Minimal assistance        Gait Abnormalities: Decreased step clearance; Antalgic(excessively flexed posture, trunk and knees)              Speed/Hetal: Pace decreased (<100 feet/min)  Step Length: Left shortened;Right shortened             Pain:  Pain Scale 1: Numeric (0 - 10)  Pain Intensity 1: 8  Pain Location 1: Back  Pain Orientation 1: Mid  Pain Description 1: Aching  Pain Intervention(s) 1: Medication (see MAR)  Activity Tolerance:   Fair, c/o severe back pain    After treatment:   [x]  Patient left in no apparent distress sitting up in chair  []  Patient left in no apparent distress in bed  [x]  Call bell left within reach  [x]  Nursing notified  []  Caregiver present  []  Bed alarm activated    COMMUNICATION/COLLABORATION:   The patients plan of care was discussed with: Registered Nurse    Blaire Estrada   Time Calculation: 17 mins

## 2018-12-17 NOTE — PROGRESS NOTES
Bedside and Verbal shift change report given to Whit Weathers (oncoming nurse) by Ely Parra RN (offgoing nurse). Report included the following information SBAR, Kardex, OR Summary, Intake/Output, MAR and Recent Results.

## 2018-12-17 NOTE — PROGRESS NOTES
Problem: Mobility Impaired (Adult and Pediatric)  Goal: *Acute Goals and Plan of Care (Insert Text)  Physical Therapy Goals  Initiated 12/15/2018    1. Patient will move from supine to sit and sit to supine  in bed with modified independence within 4 days. 2. Patient will perform sit to stand with modified independence within 4 days. 3. Patient will ambulate with modified independence for 150 feet with the least restrictive device within 4 days. 4. Patient will ascend/descend 1 stairs with 1 handrail(s) with minimal assistance/contact guard assist within 4 days. 5. Patient will verbalize and demonstrate understanding of spinal precautions (No bending, lifting greater than 5 lbs, or twisting; log-roll technique; frequent repositioning as instructed) within 4 days. physical Therapy TREATMENT  Patient: Mamie Bui (68 y.o. female)  Date: 12/17/2018  Diagnosis: Pseudoclaudication syndrome [M48.062]  Degeneration of lumbar intervertebral disc [M51.36]  Acquired spondylolisthesis [M43.10] <principal problem not specified>  Procedure(s) (LRB):  LUMBAR LAMINECTOMY L2-5, LUMBAR FUSION L4-5 (REQ FLIP ROOMS) (N/A) 3 Days Post-Op  Precautions: Fall, Back,No bending, no lifting greater than 5 lbs, no twisting, log-roll technique, repositioning every 20-30 min except when sleeping, brace when OOB    Chart, physical therapy assessment, plan of care and goals were reviewed.     ASSESSMENT:  Pt is making slow progress and having significantly increased difficulty with mobility this session, pt received in supine, completed log roll transfer to EOB with maximum verbal cueing, bed rail, and minimal assist, donned TLSO with total assist, pt required moderate assistance to stand with walker, her posture is excessively flexed at trunk and knees, pt attempted to initiate ambulation however shuffling and having significant difficulty advancing BLE, trunk and knee flexion increasing, only able to take a few steps with moderate assistance and then pulled  a chair behind her,  pt lives alone and needs to be modified independent to return home safely, discussed discharge recommendations with pt, she has decreased insight into her deficits and does not seem aware that she will not be able to return home without assistance at her current level of function,  highly recommend inpatient rehab to maximize strength, balance, and safe, functional mobility   Progression toward goals:  []      Improving appropriately and progressing toward goals  [x]      Improving slowly and progressing toward goals  []      Not making progress toward goals and plan of care will be adjusted     PLAN:  Patient continues to benefit from skilled intervention to address the above impairments. Continue treatment per established plan of care. Discharge Recommendations:  Inpatient Rehab  Further Equipment Recommendations for Discharge: To be determined      SUBJECTIVE:   Patient stated My family is busy. They are trying to pick out a car.   Pt reported she needs her daughter's assistance with making decisions on discharge however then states she is too busy. The patient stated 2/3 back precautions. Reviewed all 3 with patient. OBJECTIVE DATA SUMMARY:   Critical Behavior:  Neurologic State: Alert  Orientation Level: Oriented X4  Cognition: decreased decision making, decreased insight into deficits, decreased problem solving   Safety/Judgement: Awareness of environment  Functional Mobility Training:  Bed Mobility:  Log Rolling: Minimum assistance;Assist x1;Adaptive equipment; Additional time  Supine to Sit: Assist x1;Minimum assistance; Additional time; Adaptive equipment              Brace donned with  total assistance   Transfers:  Sit to Stand: Assist x1; Moderate assistance; Additional time, stands with excessively flexed posture of trunk and knees   Stand to Sit: Moderate assistance;Assist x1;Additional time                            Balance:  Sitting: Intact  Standing: Impaired  Standing - Static: Fair  Standing - Dynamic : Fair  Ambulation/Gait Training:  Distance (ft): 1 Feet (ft), pt had significant difficulty advancing BLE, increased flexed posture, increased back and posterior leg pain limiting her mobility at this time, chair pulled up behind pt as she was struggling to remain upright   Assistive Device: Gait belt;Walker, rolling;Brace/Splint  Ambulation - Level of Assistance: Assist x1;moderate assistance        Gait Abnormalities: shuffling steps; Antalgic(excessively flexed posture, trunk and knees)              Speed/Hetal: Pace decreased (<100 feet/min)  Step Length: Left shortened;Right shortened          Pain:  Pain Scale 1: Numeric (0 - 10)  Pain Intensity 1: 10  Pain Location 1: Back, posterior thighs   Pain Orientation 1: Mid  Pain Description 1: Aching  Pain Intervention(s) 1: Medication (see MAR)  Activity Tolerance:   Poor tolerance for ambulation     After treatment:   [x]  Patient left in no apparent distress sitting up in chair  []  Patient left in no apparent distress in bed  [x]  Call bell left within reach  [x]  Nursing notified  []  Caregiver present  []  Bed alarm activated    COMMUNICATION/COLLABORATION:   The patients plan of care was discussed with: Registered Nurse    Blaire Young   Time Calculation: 20 mins

## 2018-12-17 NOTE — PROGRESS NOTES
Bedside and Verbal shift change report given to [facility] RN (oncoming nurse) by Abbey Alatorre RN (offgoing nurse). Report included the following information SBAR, Kardex, OR Summary, Intake/Output, MAR and Recent Results.

## 2018-12-18 VITALS
RESPIRATION RATE: 18 BRPM | SYSTOLIC BLOOD PRESSURE: 116 MMHG | BODY MASS INDEX: 24.24 KG/M2 | OXYGEN SATURATION: 100 % | DIASTOLIC BLOOD PRESSURE: 68 MMHG | TEMPERATURE: 99.1 F | HEIGHT: 64 IN | WEIGHT: 142 LBS | HEART RATE: 75 BPM

## 2018-12-18 LAB — HGB BLD-MCNC: 9.2 G/DL (ref 11.5–16)

## 2018-12-18 PROCEDURE — 74011250637 HC RX REV CODE- 250/637: Performed by: PHYSICIAN ASSISTANT

## 2018-12-18 PROCEDURE — 85018 HEMOGLOBIN: CPT

## 2018-12-18 PROCEDURE — 97535 SELF CARE MNGMENT TRAINING: CPT

## 2018-12-18 PROCEDURE — 97116 GAIT TRAINING THERAPY: CPT

## 2018-12-18 PROCEDURE — 36415 COLL VENOUS BLD VENIPUNCTURE: CPT

## 2018-12-18 PROCEDURE — 97530 THERAPEUTIC ACTIVITIES: CPT

## 2018-12-18 RX ORDER — CHOLECALCIFEROL (VITAMIN D3) 125 MCG
10 CAPSULE ORAL
COMMUNITY
End: 2019-09-20 | Stop reason: ALTCHOICE

## 2018-12-18 RX ORDER — BISMUTH SUBSALICYLATE 262 MG
1 TABLET,CHEWABLE ORAL DAILY
COMMUNITY
End: 2019-09-20 | Stop reason: ALTCHOICE

## 2018-12-18 RX ADMIN — STANDARDIZED SENNA CONCENTRATE AND DOCUSATE SODIUM 1 TABLET: 8.6; 5 TABLET ORAL at 09:16

## 2018-12-18 RX ADMIN — OXYCODONE HYDROCHLORIDE 5 MG: 5 TABLET ORAL at 10:06

## 2018-12-18 RX ADMIN — OXYCODONE HYDROCHLORIDE 10 MG: 5 TABLET ORAL at 06:46

## 2018-12-18 RX ADMIN — Medication 10 ML: at 13:41

## 2018-12-18 RX ADMIN — DIAZEPAM 5 MG: 5 TABLET ORAL at 09:16

## 2018-12-18 RX ADMIN — Medication 10 ML: at 06:46

## 2018-12-18 RX ADMIN — OXYCODONE HYDROCHLORIDE 10 MG: 5 TABLET ORAL at 13:30

## 2018-12-18 RX ADMIN — ACETAMINOPHEN 1000 MG: 500 TABLET ORAL at 06:45

## 2018-12-18 RX ADMIN — ACETAMINOPHEN 1000 MG: 500 TABLET ORAL at 11:35

## 2018-12-18 RX ADMIN — OXYCODONE HYDROCHLORIDE 10 MG: 5 TABLET ORAL at 16:33

## 2018-12-18 NOTE — PROGRESS NOTES
Problem: Mobility Impaired (Adult and Pediatric)  Goal: *Acute Goals and Plan of Care (Insert Text)  Physical Therapy Goals  Initiated 12/15/2018    1. Patient will move from supine to sit and sit to supine  in bed with modified independence within 4 days. 2. Patient will perform sit to stand with modified independence within 4 days. 3. Patient will ambulate with modified independence for 150 feet with the least restrictive device within 4 days. 4. Patient will ascend/descend 1 stairs with 1 handrail(s) with minimal assistance/contact guard assist within 4 days. 5. Patient will verbalize and demonstrate understanding of spinal precautions (No bending, lifting greater than 5 lbs, or twisting; log-roll technique; frequent repositioning as instructed) within 4 days. physical Therapy TREATMENT  Patient: Carlos Monge (68 y.o. female)  Date: 12/18/2018  Diagnosis: Pseudoclaudication syndrome [M48.062]  Degeneration of lumbar intervertebral disc [M51.36]  Acquired spondylolisthesis [M43.10] <principal problem not specified>  Procedure(s) (LRB):  LUMBAR LAMINECTOMY L2-5, LUMBAR FUSION L4-5 (REQ FLIP ROOMS) (N/A) 4 Days Post-Op  Precautions: Fall, Back  Chart, physical therapy assessment, plan of care and goals were reviewed. ASSESSMENT:  Chart reviewed and RN approved for mobility. Patient received sitting up I chair with TLSO donned, agreeable to work with therapy. Patient able to recall 2/3 back precautions, reviewed all 3 with patient. Patient performed sit<>stand with Estella x1 and additional time. Patient ambulated 40ft using RW with Estella demonstrating slow, shuffled, antalgic gait with increased trunk and knee flexion. Patient required verbal cuing for maintaining RW at appropriate distance from body. Patient returned to chair and left sitting up with all needs met. Recommending IP rehab upon discharge to maximize safety and independence with functional mobility.    Progression toward goals:  [] Improving appropriately and progressing toward goals  []      Improving slowly and progressing toward goals  []      Not making progress toward goals and plan of care will be adjusted     PLAN:  Patient continues to benefit from skilled intervention to address the above impairments. Continue treatment per established plan of care. Discharge Recommendations:  Inpatient Rehab  Further Equipment Recommendations for Discharge:  TBD     SUBJECTIVE:   Patient stated My goal is to stand from a chair and walk to answer my door at home.    The patient stated 2/3 back precautions. Reviewed all 3 with patient. OBJECTIVE DATA SUMMARY:   Critical Behavior:  Neurologic State: Alert  Orientation Level: Oriented X4  Cognition: Appropriate decision making, Appropriate for age attention/concentration, Appropriate safety awareness, Follows commands  Safety/Judgement: Awareness of environment  Functional Mobility Training:  Bed Mobility:  Log Rolling: Moderate assistance;Assist x1  Supine to Sit: Moderate assistance;Assist x1              Brace donned prior to PT arrival  Transfers:  Sit to Stand: Minimum assistance;Assist x1;Additional time  Stand to Sit: Minimum assistance;Assist x1;Additional time        Bed to Chair: Minimum assistance;Assist x1                    Balance:  Sitting: Intact  Standing: Impaired  Standing - Static: Fair  Standing - Dynamic : Fair  Ambulation/Gait Training:  Distance (ft): 40 Feet (ft)  Assistive Device: Gait belt;Walker, rolling  Ambulation - Level of Assistance: Assist x1;Minimal assistance        Gait Abnormalities: Decreased step clearance; Antalgic(excessive flexion of trunk and knees )        Base of Support: Narrowed     Speed/Hetal: Pace decreased (<100 feet/min); Shuffled  Step Length: Left shortened;Right shortened                    Stairs:            Therapeutic Exercises:     Pain:  Pain Scale 1: Numeric (0 - 10)  Pain Intensity 1: 6  Pain Location 1: Back  Pain Orientation 1: Lower  Pain Description 1: Aching  Pain Intervention(s) 1: Medication (see MAR)  Activity Tolerance:   VSS, NAD  Please refer to the flowsheet for vital signs taken during this treatment.   After treatment:   [x]  Patient left in no apparent distress sitting up in chair  []  Patient left in no apparent distress in bed  [x]  Call bell left within reach  [x]  Nursing notified  []  Caregiver present  []  Bed alarm activated    COMMUNICATION/COLLABORATION:   The patients plan of care was discussed with: Occupational Therapist and Registered Nurse    Valdemar Duarte, PT, DPT   Time Calculation: 14 mins

## 2018-12-18 NOTE — PROGRESS NOTES
Problem: Self Care Deficits Care Plan (Adult)  Goal: *Acute Goals and Plan of Care (Insert Text)  Occupational Therapy Goals  Initiated 12/15/2018    1. Patient will perform lower body dressing with supervision/set-up using AE PRN within 4 days. 2.  Patient will perform toileting with supervision/set-up using most appropriate DME within 4 days. 3.  Patient will perform grooming standing at sink with supervision/set-up within 4 days. 4.  Patient will don/doff back brace at supervision/set-up within 4 days. 5.  Patient will verbalize/demonstrate 3/3 back precautions during ADL tasks without cues within 4 days. Occupational Therapy TREATMENT  Patient: Luis Spencer (68 y.o. female)  Date: 12/18/2018  Diagnosis: Pseudoclaudication syndrome [M48.062]  Degeneration of lumbar intervertebral disc [M51.36]  Acquired spondylolisthesis [M43.10] <principal problem not specified>  Procedure(s) (LRB):  LUMBAR LAMINECTOMY L2-5, LUMBAR FUSION L4-5 (REQ FLIP ROOMS) (N/A) 4 Days Post-Op  Precautions: Fall, Back No bending, no lifting greater than 5 lbs, no twisting, log-roll technique, repositioning every 20-30 min except when sleeping, TLSO brace when OOB  Chart, occupational therapy assessment, plan of care, and goals were reviewed. ASSESSMENT:  Patient cleared by nurse. Received in bed. Able to recall 3/3 back precautions. Instructed in application of precautions during mobility. Patient log rolled with mod assist of 1 and use of rail. Moved supine to sit with mod assist of 1. Patient participated in toileting on [facility] with max assist overall seconary to impaired abiltiy to reach buttocks. Donned TLSO with instruction and max assist of 1 (brace had been taken apart on both sides). Patient ambulated to toilet in bathroom with RW and CGA to min assist, transferred to toilet with cues for use of grab bar. Took brief rest break. Ambulated to sink and washed hands with CGA at sink. Patient returned to sit in chair.  Nurse present with medication. Patient reports pain at a 6/10. Patient continues to report sensitivity to touch on bilateral LEs, but reports improved since 12/17/18. Patient instructed in working on upright standing and looking ahead as she reports she has been walking with a flexed position prior to surgery. Patient improving in all aspects of mobility, self care, strength and endurance this date. Patient mod I and living alone at baseline. She is far below being able to care for self. Recommend Inpatient rehab. Progression toward goals:  [x]          Improving appropriately and progressing toward goals  []          Improving slowly and progressing toward goals  []          Not making progress toward goals and plan of care will be adjusted     PLAN:  Patient continues to benefit from skilled intervention to address the above impairments. Continue treatment per established plan of care. Discharge Recommendations:  Inpatient Rehab  Further Equipment Recommendations for Discharge:  none     SUBJECTIVE:   Patient stated I feel better today.   The patient stated 3/3 back precautions. Reviewed all 3 with patient. OBJECTIVE DATA SUMMARY:   Cognitive/Behavioral Status:  Neurologic State: Alert  Orientation Level: Oriented X4  Cognition: Appropriate decision making, Appropriate for age attention/concentration, Appropriate safety awareness, Follows commands  Safety/Judgement: Awareness of environment    Functional Mobility and Transfers for ADLs:  Bed Mobility:  Rolling:  Moderate assistance;Assist x1  Supine to Sit: Moderate assistance;Assist x1    Transfers:  Sit to Stand: Minimum assistance;Assist x1  Functional Transfers  Toilet Transfer : Minimum assistance;Assist x1  Adaptive Equipment: Walker (comment)   Bed to Chair: Minimum assistance;Assist x1    Balance:  Sitting: Intact  Standing: Impaired  Standing - Static: Fair  Standing - Dynamic : Fair    ADL Intervention and Instruction:       Grooming  Grooming Assistance: Contact guard assistance(standing at sink)  Washing Hands: Contact guard assistance              Upper Body Dressing Assistance  Orthotics(Brace): Maximum assistance(TLSO)         Toileting  Bladder Hygiene: Minimum assistance  Bowel Hygiene: Maximum assistance  Clothing Management: Moderate assistance(gown)    Cognitive Retraining  Safety/Judgement: Awareness of environment  Dressing brace: Patient instructed and demonstrated to don/doff velcro on brace using dominant side, keeping non-dominant side intact. Dressing lower body: Patient instructed to don brace first and on the benefits to remain seated to don all clothing to increase independence with precautions and pain management. Toileting: Instructed in the benefits of a reacher to aid in clothing management. Home safety: Patient instructed and indicated understanding on home modifications and safety (raise height of ADL objects, appropriate height of chair surfaces, recliner safety, change of floor surfaces, clear pathways) to increase independence and fall prevention with RW. Standing: Patient instructed and indicated understanding to walk up to sink/counter top/surfaces, step into walker, square off while using objects, slide objects along surfaces, to increase adherence to back precautions and fall prevention. Patient instructed to increase amount of time standing in order to increase independence and tolerance with ADLs. During prolonged standing, can open cabinet door or place foot on stool to decrease spinal pressure/increase pain. Patient instructed and indicated understanding the benefits of maintaining activity tolerance, functional mobility, and independence with self care tasks during acute stay  to ensure safe return home and to baseline.  Encouraged patient to increase frequency and duration OOB, not sitting longer than 30 mins without marching/walking with staff, be out of bed for all meals, perform daily ADLs (as approved by RN/MD regarding bathing etc), and performing functional mobility to/from bathroom. Patient instruction and indicated understanding on body mechanics, ergonomics and gravitational force on the spine during different body positions to plan activities in prep for return home to complete instrumental ADLs and back to work safely. Activity Tolerance:   Fair  Please refer to the flowsheet for vital signs taken during this treatment.   After treatment:   [x] Patient left in no apparent distress sitting up in chair  [] Patient left in no apparent distress in bed  [x] Call bell left within reach  [x] Nursing notified and present  [] Caregiver present  [] Bed alarm activated    COMMUNICATION/COLLABORATION:   The patients plan of care was discussed with: Registered Nurse    LOY Silveira  Time Calculation: 39 mins

## 2018-12-18 NOTE — PROGRESS NOTES
A Spiritual Care Partner Volunteer visited patient in Rm [id number] on 12/18/2018.   Documented by:  Chaplain Chauhan MDiv, MS, [facility]  [phone] PRAY ([phone])

## 2018-12-18 NOTE — PROGRESS NOTES
Problem: Mobility Impaired (Adult and Pediatric)  Goal: *Acute Goals and Plan of Care (Insert Text)  Physical Therapy Goals  Initiated 12/15/2018    1. Patient will move from supine to sit and sit to supine  in bed with modified independence within 4 days. 2. Patient will perform sit to stand with modified independence within 4 days. 3. Patient will ambulate with modified independence for 150 feet with the least restrictive device within 4 days. 4. Patient will ascend/descend 1 stairs with 1 handrail(s) with minimal assistance/contact guard assist within 4 days. 5. Patient will verbalize and demonstrate understanding of spinal precautions (No bending, lifting greater than 5 lbs, or twisting; log-roll technique; frequent repositioning as instructed) within 4 days. physical Therapy TREATMENT  Patient: Duong Schumacher (68 y.o. female)  Date: 12/18/2018  Diagnosis: Pseudoclaudication syndrome [M48.062]  Degeneration of lumbar intervertebral disc [M51.36]  Acquired spondylolisthesis [M43.10] <principal problem not specified>  Procedure(s) (LRB):  LUMBAR LAMINECTOMY L2-5, LUMBAR FUSION L4-5 (REQ FLIP ROOMS) (N/A) 4 Days Post-Op  Precautions: Fall, Back  Chart, physical therapy assessment, plan of care and goals were reviewed. ASSESSMENT:  Chart reviewed and RN approved patient for pm mobility session. Patient received sitting up in chair agreeable to work with therapy. Patient able to recall 2/3 back precautions, reviewed all 3. Patient required verbal cuing to adhere to precautions during mobility. Patient sit<>stand with Estella and ambulated 60ft with RW demonstrating decreased gait velocity, decreased step length, decreased step clearance, and forward flexed posture. Patient with better positioning inside walker this session. Patient requesting to use BSC. Patient ambulated back to bed and transferred to supine with modA for BLE management and verbal cuing for log roll technique.   Patient left lying in bed with all needs met and nursing updated. Recommending IP rehab upon discharge. Progression toward goals:  [x]      Improving appropriately and progressing toward goals  []      Improving slowly and progressing toward goals  []      Not making progress toward goals and plan of care will be adjusted     PLAN:  Patient continues to benefit from skilled intervention to address the above impairments. Continue treatment per established plan of care. Discharge Recommendations:  Inpatient Rehab  Further Equipment Recommendations for Discharge:  TBD by rehab     SUBJECTIVE:   Patient stated Karen Marino was going to order the stir sheridan for dinner, but I guess I will be gone by then.    The patient stated 2/3 back precautions. Reviewed all 3 with patient. OBJECTIVE DATA SUMMARY:   Critical Behavior:  Neurologic State: Alert  Orientation Level: Oriented X4  Cognition: Appropriate decision making, Appropriate for age attention/concentration, Appropriate safety awareness, Follows commands  Safety/Judgement: Awareness of environment  Functional Mobility Training:  Bed Mobility:  Log Rolling: Stand-by assistance  Supine to Sit: (Received sitting in chair)  Sit to Supine: Moderate assistance(BLE management)        Interventions: Verbal cues; Tactile cues; Safety awareness training  Brace donned upon PT arrival, but total assist to doff before returning to bed  Transfers:  Sit to Stand: Minimum assistance;Assist x1;Additional time  Stand to Sit: Minimum assistance;Assist x1;Additional time        Bed to Chair: Minimum assistance;Assist x1                    Balance:  Sitting: Intact  Standing: Impaired  Standing - Static: Fair  Standing - Dynamic : Fair  Ambulation/Gait Training:  Distance (ft): 60 Feet (ft)  Assistive Device: Gait belt;Walker, rolling  Ambulation - Level of Assistance: Assist x1;Minimal assistance        Gait Abnormalities: Decreased step clearance; Antalgic(excessive flexion of trunk and knees )        Base of Support: Narrowed     Speed/Hetal: Pace decreased (<100 feet/min); Shuffled  Step Length: Left shortened;Right shortened                    Stairs: Therapeutic Exercises:     Pain:  Pain Scale 1: Numeric (0 - 10)  Pain Intensity 1: 7  Pain Location 1: Back  Pain Orientation 1: Lower  Pain Description 1: Aching; Sore  Pain Intervention(s) 1: Medication (see MAR)  Activity Tolerance:   Visit Vitals  /63 (BP 1 Location: Right arm, BP Patient Position: Sitting)   Pulse 79   Temp 99.1 °F (37.3 °C)   Resp 16   Ht 5' 4\" (1.626 m)   Wt 64.4 kg (142 lb)   SpO2 99%   BMI 24.37 kg/m²     Please refer to the flowsheet for vital signs taken during this treatment.   After treatment:   []  Patient left in no apparent distress sitting up in chair  [x]  Patient left in no apparent distress in bed  [x]  Call bell left within reach  [x]  Nursing notified  []  Caregiver present  []  Bed alarm activated    COMMUNICATION/COLLABORATION:   The patients plan of care was discussed with: Occupational Therapist and Registered Nurse    Krystal Burt, PT, DPT   Time Calculation: 26 mins

## 2018-12-18 NOTE — PROGRESS NOTES
Orthopedic Spine Progress Note  Post Op day: 4 Days Post-Op    2018 7:57 AM   Admit Date: 2018  Procedure: Procedure(s):  LUMBAR LAMINECTOMY L2-5, LUMBAR FUSION L4-5 (REQ FLIP ROOMS)    Subjective: Favian Cisneros appears well. She continues to experience back pain with spasms. She ambulated a short distance with PT yesterday. Eating very little. No N/V  Voiding    Pain Control:   Pain Assessment  Pain Scale 1: Numeric (0 - 10)  Pain Intensity 1: 7  Pain Onset 1: S/P surgical pain&related pain. Pain Location 1: Back  Pain Orientation 1: Mid  Pain Description 1: Aching  Pain Intervention(s) 1: Medication (see MAR)    Objective:          Physical Exam:  General:  Alert and oriented. No acute distress. Heart:  Respirations unlabored. Abdomen:   Extremities: Soft, non-tender. No evidence of cyanosis. Pulses palpable in both upper and lower extremities. Neurologic:  Musculoskeletal:  No new motor deficits. Neurovascular exam within normal limits. Sensation stable. Motor: unchanged C5-T1 and L2-S1. Tricia's sign negative in bilateral lower extremities. Calves soft, nontender upon palpation and with passive twitch. Moves both upper and lower extremities. Incision: clean, dry, and intact. No significant erythema or swelling. No active drainage noted. Vital Signs:    Blood pressure 106/66, pulse 73, temperature 98.3 °F (36.8 °C), resp. rate 16, height 5' 4\" (1.626 m), weight 64.4 kg (142 lb), SpO2 96 %.   Temp (24hrs), Av.9 °F (37.2 °C), Min:98.3 °F (36.8 °C), Max:100 °F (37.8 °C)      LAB:    Recent Labs     18  0211   HGB 9.7*     Lab Results   Component Value Date/Time    Sodium 144 12/15/2018 05:23 AM    Potassium 3.6 12/15/2018 05:23 AM    Chloride 114 (H) 12/15/2018 05:23 AM    CO2 23 12/15/2018 05:23 AM    Glucose 121 (H) 12/15/2018 05:23 AM    Glucose 114 (H) 2018 06:00 AM    BUN 15 12/15/2018 05:23 AM    Creatinine 0.60 12/15/2018 05:23 AM    Calcium 8.2 (L) 12/15/2018 05:23 AM       Intake/Output:No intake/output data recorded. 12/16 1901 - 12/18 0700  In: 240 [P.O.:240]  Out: 675 [Urine:675]    PT/OT:   Gait:  Gait  Base of Support: Narrowed  Speed/Hetal: Pace decreased (<100 feet/min)  Step Length: Left shortened, Right shortened  Gait Abnormalities: Decreased step clearance, Antalgic(excessively flexed posture, trunk and knees)  Ambulation - Level of Assistance: Assist x1, Minimal assistance  Distance (ft): 20 Feet (ft)  Assistive Device: Gait belt, Walker, rolling, Brace/Splint                 Assessment:   Patient is 4 Days Post-Op s/p Procedure(s):  LUMBAR LAMINECTOMY L2-5, LUMBAR FUSION L4-5 (REQ FLIP ROOMS)    Plan:     1. Continue PT/OT  2. Continue established methods of pain control  3. VTE Prophylaxes - TEDS & SCDs   4. Encouraged use of ICS  5.  Hgb pending  6.   Discharge to rehab pending    Signed By: Camille Bridges PA-C

## 2018-12-18 NOTE — PROGRESS NOTES
Hospital to [facility] [address] [facility]                                                                        68 y.o.   female    [address]   Room: [address]  Unit Phone# :  [phone]      ST. [address]  [address] [facility] [address]  Dept: [phone]  [facility]: [phone]                    SITUATION     Admitted:  12/14/2018         Attending Provider:  Kade Sawant MD       Consultations:  None    PCP:  Becca Douglas NP   [phone]    Treatment Team: Attending Provider: Kade Sawant MD; Utilization Review: Abdullahi Pinon RN; Care Manager: Bipin Martinez; Nurse Practitioner: Brittany Gould NP    Admitting Dx:  Pseudoclaudication syndrome [Y76.945]  Degeneration of lumbar intervertebral disc [M51.36]  Acquired spondylolisthesis [M43.10]       Principal Problem: <principal problem not specified>    4 Days Post-Op of   Procedure(s):  LUMBAR LAMINECTOMY L2-5, LUMBAR FUSION L4-5 (REQ FLIP ROOMS)   BY: Kade Sawant MD             ON: 12/14/2018                  Code Status: Full Code                Advance Directives:   Advance Care Planning 12/18/2018   Confirm Advance Directive None   Patient Would Like to Complete Advance Directive No   Some encounter information is confidential and restricted. Go to Review Flowsheets activity to see all data.     (Send w/patient)   No Doesnt Have       Isolation:  There are currently no Active Isolations       MDRO: No current active infections    Pain Medications given:  oxycodone    Last dose: 12/18/2018 at  [address] needed: yes  Type of equipment: walker       BACKGROUND     Allergies:  No Known Allergies    Past Medical History:   Diagnosis Date    Anxiety     Arrhythmia     PROLONGED QT EKG 7-26-18    Arthritis     Bipolar 1 disorder [facility])     son not sure where patient was diagnosed(PT. DENIES 12-7-18)    Chronic pain     Diabetes ([address]  Diverticulosis of large intestine 2018    sigmoid & descending    Heart murmur     Hypertension     HX HTN - NO MEDS CURRENTLY(12-7-18)    Prolonged Q-T interval on ECG 12/10/2018    Thyroid disease     GOITER       Past Surgical History:   Procedure Laterality Date    HX HYSTERECTOMY  1986    HX OPEN CHOLECYSTECTOMY  1986    3 months after childbirth    HX TOTAL VAGINAL HYSTERECTOMY  1986    ovaries out also - heavy bleeding        Medications Prior to Admission   Medication Sig    multivitamin (ONE A DAY) tablet Take 1 Tab by mouth daily.  melatonin tab tablet Take 10 mg by mouth nightly as needed.  ibuprofen (ADVIL) 200 mg tablet Take 200 mg by mouth two (2) times daily as needed for Pain.  HYDROcodone-acetaminophen (NORCO) 5-325 mg per tablet TAKE 1 TABLET BY MOUTH 2 TIMES DAILY AS NEEDED    diazePAM (VALIUM) 5 mg tablet Take 1 Tab by mouth every eight (8) hours as needed for Anxiety. Max Daily Amount: 15 mg.    OTHER Bedside commode       Hard scripts included in transfer packet yes    Vaccinations:    Immunization History   Administered Date(s) Administered    Influenza Vaccine (Tri) Adjuvanted 10/17/2018       Readmission Risks:    Known Risks: fall risk        The Charlson CoMorbitiy Index tool is an evidenced based tool that has more automatic generated information. The tool looks at many different items such as the age of the patient, how many times they were admitted in the last calendar year, current length of stay in the hospital and their diagnosis. All of these items are pulled automatically from information documented in the chart from various places and will generate a score that predicts whether a patient is at low (less than 13), medium (13-20) or high (21 or greater) risk of being readmitted.         ASSESSMENT                Temp: 99.1 °F (37.3 °C) (12/18/18 1404) Pulse (Heart Rate): 79 (12/18/18 1404)     Resp Rate: 16 (12/18/18 1404)           BP: 107/63 (12/18/18 1404)     O2 Sat (%): 99 % (12/18/18 1404)     Weight: 64.4 kg (142 lb)    Height: 5' 4\" (162.6 cm) (12/14/18 0756)       If above not within 1 hour of discharge:    BP:_____  P:____  R:____ T:_____ O2 Sat: ___%  O2: ______    Active Orders   Diet    DIET REGULAR         Orientation: oriented to time, place, person and situation     Active Behaviors: None                                   Active Lines/Drains:  (Peg Tube / Ricketts / CL or S/L?): no    Urinary Status: Voiding     Last BM: Last Bowel Movement Date: 12/13/18     Skin Integrity: Incision (comment)(surgical back)   Wound Back Posterior-DRESSING STATUS: Changed per order    Wound Back Posterior-DRESSING TYPE: Dry dressing    Mobility: Slightly limited   Weight Bearing Status: WBAT (Weight Bearing as Tolerated)      Gait Training  Assistive Device: Gait belt, Walker, rolling  Ambulation - Level of Assistance: Assist x1, Minimal assistance  Distance (ft): 60 Feet (ft)         Lab Results   Component Value Date/Time    Glucose 121 (H) 12/15/2018 05:23 AM    Glucose 114 (H) 07/27/2018 06:00 AM    Hemoglobin A1c 5.5 12/07/2018 12:24 PM    INR 1.1 12/07/2018 12:24 PM    HGB 9.2 (L) 12/18/2018 10:13 AM    HGB 9.7 (L) 12/16/2018 02:11 AM        RECOMMENDATION     See After Visit Summary (AVS) for:  · Discharge instructions  · After [address]   · Special equipment needed (entered pre-discharge by Care Management)  · Medication Reconciliation    · Follow up Appointment(s)         Report given/sent by:  Indu Canada                    Verbal report given to: Yonny   FAXED to:  [facility]         Estimated discharge time:  12/18/2018 at 1800             Discharge instructions reviewed with patient. Opportunities for questions provided. All questions answered. Paper copy signed. IV removed. Patient discharged with all personal belongings.

## 2018-12-18 NOTE — PROGRESS NOTES
Patient has been accepted to [facility] and is ready for discharge today. Cm met with patient to discuss. Referral sent to [facility] transport for a 4 pm ; they responded they can come at 4:30. Report can be called to [phone]. RUBI.   [employer] , [place]

## 2018-12-21 NOTE — DISCHARGE SUMMARY
Procedure(s):  LUMBAR LAMINECTOMY L2-5, LUMBAR FUSION L4-5 (REQ FLIP ROOMS) Operative Report      Date of Surgery: 12/14/2018     Preoperative Diagnosis:  Pseudoclaudication syndrome [M48.062]  Degeneration of lumbar intervertebral disc [M51.36]  Acquired spondylolisthesis [M43.10]    Postoperative Diagnosis: Pseudoclaudication syndrome [M48.062]  Degeneration of lumbar intervertebral disc [M51.36]  Acquired spondylolisthesis       Procedure: Procedure(s):  LUMBAR LAMINECTOMY L2-5, LUMBAR FUSION L4-5 (REQ FLIP ROOMS)     Surgeon: Greg Timmons MD    History and Hospital Course: Jessica Larson is a pleasant 68y.o. year old female who has complaints of chronic lumbar pain. Diagnostic testing found severe spinal stenosis from L2-L5 with a retrolisthesis at least at L3-4 and L4-5. Having failed conservative treatment, the patient elected to undergo operative intervention. She tolerated the procedure well and was admitted post-operatively for antibiotics and pain control. On post-op day 1, the patient was doing well. She had some complaints of lower back pain but no leg pain. She was started on a regular diet. The PCA was discontinued and the patient was started on oral pain medications. She was allowed to started getting out of bed with physical therapy. IV antibiotics were continued. On post-op day 2, the patient continued to progress well. Feeling much better today. She had been OOB to chair and has ambulated to the bathroom with a walker. She didcontinue to have pain at her surgical site at her low back. On post-op day 3, continued to work with PT. Began planning for discharge to rehab facility. On post-op day 4, the patient was deemed ready for discharge. She was discharged to rehab. She was tolerating a regular diet and pain was well controlled with oral pain medications. The patient was discharged with prescriptions for pain medications.   She was instructed to wear her brace at all times when out of bed. She was instructed to limit her bending, lifting and twisting. The patient will follow-up in 10-14 days for repeat x-rays and a wound check.       Signed By: Ramon Logan MD

## 2018-12-24 ENCOUNTER — DOCUMENTATION ONLY (OUTPATIENT)
Dept: INTERNAL MEDICINE CLINIC | Age: 73
End: 2018-12-24

## 2019-03-29 ENCOUNTER — OFFICE VISIT (OUTPATIENT)
Dept: INTERNAL MEDICINE CLINIC | Age: 74
End: 2019-03-29

## 2019-03-29 ENCOUNTER — HOSPITAL ENCOUNTER (OUTPATIENT)
Dept: LAB | Age: 74
Discharge: HOME | End: 2019-03-29
Payer: MEDICARE

## 2019-03-29 VITALS
WEIGHT: 135 LBS | DIASTOLIC BLOOD PRESSURE: 60 MMHG | HEART RATE: 74 BPM | SYSTOLIC BLOOD PRESSURE: 128 MMHG | BODY MASS INDEX: 23.05 KG/M2 | TEMPERATURE: 98.4 F | HEIGHT: 64 IN | OXYGEN SATURATION: 97 % | RESPIRATION RATE: 16 BRPM

## 2019-03-29 DIAGNOSIS — R29.898 WEAKNESS OF BOTH LEGS: Primary | ICD-10-CM

## 2019-03-29 DIAGNOSIS — E07.9 THYROID DISEASE: ICD-10-CM

## 2019-03-29 DIAGNOSIS — Z86.39 HISTORY OF DIABETES MELLITUS, TYPE II: ICD-10-CM

## 2019-03-29 DIAGNOSIS — R32 INCONTINENCE OF URINE IN FEMALE: ICD-10-CM

## 2019-03-29 LAB
BILIRUB UR QL STRIP: NEGATIVE
GLUCOSE UR-MCNC: NEGATIVE MG/DL
KETONES P FAST UR STRIP-MCNC: NEGATIVE MG/DL
PH UR STRIP: 5.5 [PH] (ref 4.6–8)
PROT UR QL STRIP: NEGATIVE
SP GR UR STRIP: 1.02 (ref 1–1.03)
UA UROBILINOGEN AMB POC: NORMAL (ref 0.2–1)
URINALYSIS CLARITY POC: CLEAR
URINALYSIS COLOR POC: NORMAL
URINE BLOOD POC: NORMAL
URINE LEUKOCYTES POC: NORMAL
URINE NITRITES POC: NEGATIVE

## 2019-03-29 PROCEDURE — 80053 COMPREHEN METABOLIC PANEL: CPT

## 2019-03-29 PROCEDURE — 84443 ASSAY THYROID STIM HORMONE: CPT

## 2019-03-29 PROCEDURE — 83036 HEMOGLOBIN GLYCOSYLATED A1C: CPT

## 2019-03-29 PROCEDURE — 87086 URINE CULTURE/COLONY COUNT: CPT

## 2019-03-29 PROCEDURE — 85025 COMPLETE CBC W/AUTO DIFF WBC: CPT

## 2019-03-29 RX ORDER — HYDROCODONE BITARTRATE AND ACETAMINOPHEN 5; 325 MG/1; MG/1
TABLET ORAL
Refills: 0 | COMMUNITY
Start: 2019-02-20 | End: 2019-04-19

## 2019-03-29 RX ORDER — GLUCOSAMINE SULFATE 1500 MG
POWDER IN PACKET (EA) ORAL
Refills: 0 | COMMUNITY
Start: 2019-01-02 | End: 2019-04-26 | Stop reason: SDUPTHER

## 2019-03-29 RX ORDER — CYCLOBENZAPRINE HCL 10 MG
TABLET ORAL
Refills: 0 | COMMUNITY
Start: 2019-01-02 | End: 2019-04-19

## 2019-03-29 RX ORDER — GABAPENTIN 300 MG/1
CAPSULE ORAL
Refills: 1 | COMMUNITY
Start: 2019-01-14 | End: 2019-06-20

## 2019-03-29 RX ORDER — METHOCARBAMOL 750 MG/1
TABLET, FILM COATED ORAL
Refills: 0 | COMMUNITY
Start: 2019-01-31 | End: 2019-04-19

## 2019-03-29 RX ORDER — LANOLIN ALCOHOL/MO/W.PET/CERES
CREAM (GRAM) TOPICAL
Refills: 0 | COMMUNITY
Start: 2019-01-02 | End: 2019-04-26 | Stop reason: SDUPTHER

## 2019-03-29 NOTE — PROGRESS NOTES
Cammie Philip is a 68 y.o. female    Chief Complaint   Patient presents with    New Patient     pt c/o of muscle weakness, spasms, cramping and stiffness in B/L legs     1. Have you been to the ER, urgent care clinic since your last visit? Hospitalized since your last visit? Pt admitted to [facility] 12/14/18-12/18/18 for Lumbar Laminectomy L2-5 and lumbar fusion L4-5.    2. Have you seen or consulted any other health care providers outside of the [address] [place] [address] since your last visit? Include any pap smears or colon screening. Pt states she was seen by Orthopedics Specialist on 3/28/19 for a routien follow up.     Visit Vitals  /60 (BP 1 Location: Left arm, BP Patient Position: Sitting)   Pulse 74   Temp 98.4 °F (36.9 °C) (Oral)   Resp 16   Ht 5' 4\" (1.626 m)   Wt 135 lb (61.2 kg)   SpO2 97%   BMI 23.17 kg/m²

## 2019-03-29 NOTE — PROGRESS NOTES
HISTORY OF PRESENT ILLNESS  Ani Flores is a 68 y.o. female. HPI  Patient presents to the office to get established. She states she use to see Dr. Laura Romo. She reports she had lumbar surgery back in December. She had in patient physical therapy and they also came to her home as well. She reports she has been having pain of her legs and cramping of her legs. She saw the surgeon a couple of days ago and he is not sure why she is having these symptoms. She states he would like to get a MRI of her neck. She states she would like to know why she is feeling this way. She is suppose to see the cardiologist as well due to a valve problem.   Past Medical History:   Diagnosis Date    Acquired spondylolisthesis     Anxiety     Arrhythmia     PROLONGED QT EKG 7-26-18    Arthritis     Bipolar 1 disorder [facility])     son not sure where patient was diagnosed(PT. DENIES 12-7-18)    Chronic pain     Degeneration of lumbar intervertebral disc     Diabetes ([facility] [address]     Diverticulosis of large intestine 2018    sigmoid & descending    Heart murmur     Hypertension     HX HTN - NO MEDS CURRENTLY(12-7-18)    Prolonged Q-T interval on ECG 12/10/2018    Pseudoclaudication     Thyroid disease     GOITER     No Known Allergies    Family History   Problem Relation Age of Onset    Cancer Mother         bone cancer    Heart Failure Father     Cancer Sister         GI    Anesth Problems Neg Hx      Past Surgical History:   Procedure Laterality Date    HX HYSTERECTOMY  1986    HX LUMBAR FUSION  12/14/2018    L4-5    HX LUMBAR LAMINECTOMY  12/14/2018    L2-5    HX OPEN CHOLECYSTECTOMY  1986    3 months after childbirth    HX TOTAL VAGINAL HYSTERECTOMY  1986    ovaries out also - heavy bleeding      Social History     Socioeconomic History    Marital status:      Spouse name: Not on file    Number of children: Not on file    Years of education: Not on file    Highest education level: Not on file   Tobacco Use    Smoking status: Never Smoker    Smokeless tobacco: Never Used   Substance and Sexual Activity    Alcohol use: No    Drug use: Yes     Types: Prescription, Opiates    Sexual activity: Never   Social History Narrative    68year old  female admitted voluntarily for psyychotic behavior. Pt has paranoid delusions. She is widodef and lives alone. She was discharged from [facility] 3 days ago. Review of Systems   Musculoskeletal: Positive for myalgias. Neurological: Positive for weakness. Blood pressure 128/60, pulse 74, temperature 98.4 °F (36.9 °C), temperature source Oral, resp. rate 16, height 5' 4\" (1.626 m), weight 135 lb (61.2 kg), SpO2 97 %. Physical Exam   Constitutional: No distress. Resting in the wheel chair. Cardiovascular: Normal rate and regular rhythm. Murmur heard. Pulmonary/Chest: Effort normal and breath sounds normal.   Musculoskeletal: She exhibits tenderness. She exhibits no edema. Patient not able to extend legs without pain. Patient not able to do hip flexion without pain. She was able to stand on the scale. ASSESSMENT and PLAN  Diagnoses and all orders for this visit:    1. Weakness of both legs  -     METABOLIC PANEL, COMPREHENSIVE  -     CBC WITH AUTOMATED DIFF  -     HEMOGLOBIN A1C WITH EAG  -     REFERRAL TO NEUROLOGY    2. Thyroid disease  -     METABOLIC PANEL, COMPREHENSIVE  -     CBC WITH AUTOMATED DIFF  -     TSH 3RD GENERATION    3. History of diabetes mellitus, type II  -     HEMOGLOBIN A1C WITH EAG    4. Incontinence of urine in female  -     AMB POC URINALYSIS DIP STICK AUTO W/O MICRO  -     CULTURE, URINE    patient to get some labs done. She will be contact with the results once back. I am not sure the cause of her weakness. She will be referred to the neurologist for further evaluation. I did try to ask the patient about her mental health history.  She states she was in the hospital once and was diagnosed but she states she does not have this diagnoses (bipolar?) she is not on medication for this and states she does not need it. She does get a little defensive when I tried to ask her about this. She said \"Dr. Bianca Dennison asked me the same thing and I wasn't there to discuss that, I was there to discuss my back pain. \"    She will be contact with the lab results once back. All this discussed with the patient and she understands and agrees.

## 2019-03-30 LAB
ALBUMIN SERPL-MCNC: 4.5 G/DL (ref 3.5–4.8)
ALBUMIN/GLOB SERPL: 1.7 {RATIO} (ref 1.2–2.2)
ALP SERPL-CCNC: 61 IU/L (ref 39–117)
ALT SERPL-CCNC: 9 IU/L (ref 0–32)
AST SERPL-CCNC: 12 IU/L (ref 0–40)
BACTERIA UR CULT: NO GROWTH
BASOPHILS # BLD AUTO: 0 X10E3/UL (ref 0–0.2)
BASOPHILS NFR BLD AUTO: 0 %
BILIRUB SERPL-MCNC: 0.7 MG/DL (ref 0–1.2)
BUN SERPL-MCNC: 13 MG/DL (ref 8–27)
BUN/CREAT SERPL: 23 (ref 12–28)
CALCIUM SERPL-MCNC: 9.7 MG/DL (ref 8.7–10.3)
CHLORIDE SERPL-SCNC: 102 MMOL/L (ref 96–106)
CO2 SERPL-SCNC: 23 MMOL/L (ref 20–29)
CREAT SERPL-MCNC: 0.56 MG/DL (ref 0.57–1)
EOSINOPHIL # BLD AUTO: 0.1 X10E3/UL (ref 0–0.4)
EOSINOPHIL NFR BLD AUTO: 1 %
ERYTHROCYTE [DISTWIDTH] IN BLOOD BY AUTOMATED COUNT: 14.8 % (ref 12.3–15.4)
EST. AVERAGE GLUCOSE BLD GHB EST-MCNC: 111 MG/DL
GLOBULIN SER CALC-MCNC: 2.6 G/DL (ref 1.5–4.5)
GLUCOSE SERPL-MCNC: 99 MG/DL (ref 65–99)
HBA1C MFR BLD: 5.5 % (ref 4.8–5.6)
HCT VFR BLD AUTO: 39.6 % (ref 34–46.6)
HGB BLD-MCNC: 13 G/DL (ref 11.1–15.9)
IMM GRANULOCYTES # BLD AUTO: 0 X10E3/UL (ref 0–0.1)
IMM GRANULOCYTES NFR BLD AUTO: 0 %
LYMPHOCYTES # BLD AUTO: 2.4 X10E3/UL (ref 0.7–3.1)
LYMPHOCYTES NFR BLD AUTO: 34 %
MCH RBC QN AUTO: 28.6 PG (ref 26.6–33)
MCHC RBC AUTO-ENTMCNC: 32.8 G/DL (ref 31.5–35.7)
MCV RBC AUTO: 87 FL (ref 79–97)
MONOCYTES # BLD AUTO: 0.5 X10E3/UL (ref 0.1–0.9)
MONOCYTES NFR BLD AUTO: 7 %
NEUTROPHILS # BLD AUTO: 4 X10E3/UL (ref 1.4–7)
NEUTROPHILS NFR BLD AUTO: 58 %
PLATELET # BLD AUTO: 219 X10E3/UL (ref 150–379)
POTASSIUM SERPL-SCNC: 3.7 MMOL/L (ref 3.5–5.2)
PROT SERPL-MCNC: 7.1 G/DL (ref 6–8.5)
RBC # BLD AUTO: 4.54 X10E6/UL (ref 3.77–5.28)
SODIUM SERPL-SCNC: 141 MMOL/L (ref 134–144)
TSH SERPL DL<=0.005 MIU/L-ACNC: 0.83 UIU/ML (ref 0.45–4.5)
WBC # BLD AUTO: 7 X10E3/UL (ref 3.4–10.8)

## 2019-04-01 NOTE — PROGRESS NOTES
Please let the patient know, no growth on urine culture. Labs look good. Remind her it is important she see neurology.  thanks

## 2019-04-02 NOTE — PROGRESS NOTES
Writer contacted patient to inform of UC, lab and reminder per Ximena Sarah, patient verbalized understanding and informed of appointment made with Neurology 4/19 and MRI on 4/4.

## 2019-04-19 ENCOUNTER — OFFICE VISIT (OUTPATIENT)
Dept: NEUROLOGY | Age: 74
End: 2019-04-19

## 2019-04-19 VITALS
TEMPERATURE: 98.3 F | DIASTOLIC BLOOD PRESSURE: 78 MMHG | BODY MASS INDEX: 23.05 KG/M2 | OXYGEN SATURATION: 98 % | HEIGHT: 64 IN | HEART RATE: 71 BPM | SYSTOLIC BLOOD PRESSURE: 100 MMHG | WEIGHT: 135 LBS | RESPIRATION RATE: 20 BRPM

## 2019-04-19 DIAGNOSIS — M62.838 MUSCLE SPASM OF BOTH LOWER LEGS: Primary | ICD-10-CM

## 2019-04-19 DIAGNOSIS — R29.898 WEAKNESS OF BOTH LEGS: ICD-10-CM

## 2019-04-19 RX ORDER — BACLOFEN 10 MG/1
TABLET ORAL
Qty: 180 TAB | Refills: 1 | Status: SHIPPED | OUTPATIENT
Start: 2019-04-19 | End: 2020-08-24

## 2019-04-19 NOTE — PROGRESS NOTES
Name: Lexi Patel      :  1945    PCP:   Sugey De Santiago NP      Referring:  Myrlene Riedel  MRN:   [id number]    Chief Complaint:   Chief Complaint   Patient presents with    Claudication     New Patient       HISTORY OF PRESENT ILLNESS:     This is a 68 y.o. female with PMHx Arrhythmia (prolonged QT), arthritis, lumbar ddd, DM, Bipolar D/o, HTN, Pseudoclaudication, Lumbar spinal stenosis s/p lumbar spine surgery in Dec 2018 who is referred to discuss weakness of both legs. Patient describes that for about a year and half started out with cramping and spasms in legs but that got worse over time, and legs were getting weaker. She had xrays of her back and MRI of her lumbar spine which showed multi-level spinal stenosis (moderate-severe at L2-3, severe at L4-5) in 2018. She saw Orthopedics, they reviewed the images and agreed to proceed to back surgery in December to alleviate the symptoms. She says no change in her symptoms as compared to before surgery. She continues to describe severe cramping in legs, legs feel very weak, has trouble standing/ supporting herself, numbness in anterior thighs going down front of legs and into feet. Currently taking Gabapentin 300 mg 1 AM/ 1 Afternoon/ 2 PM but says it hasn't helped. She tried hydrocodone/ oxycodone during the perioperative phase but says it didn't help. Tried/ failed: magnesium, methocarbamol, flexeril, tizanidine. Hasn't tried Baclofen. No upper extremity symptoms. Her Orthopedic has ordered an MRI C-spine to evaluate for cervical stenosis as a cause of her leg weakness. She is DM and says her recent A1c last month was 5.5.           Complete Review of Systems: + depression, fatigue, joint point, muscle pain, muscle weakness, neuropathy; otherwise as noted in HPI     No Known Allergies  Past Medical History:   Diagnosis Date    Acquired spondylolisthesis     Anxiety     Arrhythmia     PROLONGED QT EKG 18    Arthritis  Bipolar 1 disorder [facility])     son not sure where patient was diagnosed(PT. DENPAULINE 12-7-18)    Chronic pain     Degeneration of lumbar intervertebral disc     Diabetes ([facility] [address]     Diverticulosis of large intestine 2018    sigmoid & descending    Heart murmur     Hypertension     HX HTN - NO MEDS CURRENTLY(12-7-18)    Prolonged Q-T interval on ECG 12/10/2018    Pseudoclaudication     Thyroid disease     GOITER     Current Outpatient Medications   Medication Sig Dispense Refill    baclofen (LIORESAL) 10 mg tablet Take one to two tablets, three times a day. For severe muscle cramping and spasms in legs. 180 Tab 1    multivitamin (ONE A DAY) tablet Take 1 Tab by mouth daily.  cholecalciferol (VITAMIN D3) 1,000 unit cap TAKE 1 CAPSULE BY MOUTH EVERY DAY  0    cyanocobalamin 1,000 mcg tablet TAKE 1 TABLET BY MOUTH EVERY DAY  0    gabapentin (NEURONTIN) 300 mg capsule TAKE 1 CAPSULE BY MOUTH IN THE MORNING, TAKE 1 CAPSULE AT LUNCH, AND TAKE 2 CAPSULES AT BEDTIME  1    melatonin tab tablet Take 10 mg by mouth nightly as needed.       OTHER Bedside commode 1 Each 0     Past Surgical History:   Procedure Laterality Date    HX HYSTERECTOMY  1986    HX LUMBAR FUSION  12/14/2018    L4-5    HX LUMBAR LAMINECTOMY  12/14/2018    L2-5    HX OPEN CHOLECYSTECTOMY  1986    3 months after childbirth    HX TOTAL VAGINAL HYSTERECTOMY  1986    ovaries out also - heavy bleeding      Family History   Problem Relation Age of Onset    Cancer Mother         bone cancer    Heart Failure Father     Cancer Sister         GI    Anesth Problems Neg Hx      Social History     Socioeconomic History    Marital status:      Spouse name: Not on file    Number of children: Not on file    Years of education: Not on file    Highest education level: Not on file   Occupational History    Not on file   Social Needs    Financial resource strain: Not on file    Food insecurity:     Worry: Not on file     Inability: Not on file   In[employer]ac[employer] needs:     Medical: Not on file     Non-medical: Not on file   Tobacco Use    Smoking status: Never Smoker    Smokeless tobacco: Never Used   Substance and Sexual Activity    Alcohol use: No    Drug use: Yes     Types: Prescription, Opiates    Sexual activity: Never   Lifestyle    Physical activity:     Days per week: Not on file     Minutes per session: Not on file    Stress: Not on file   Relationships    Social connections:     Talks on phone: Not on file     Gets together: Not on file     Attends [employer] service: Not on file     Active member of club or organization: Not on file     Attends meetings of clubs or organizations: Not on file     Relationship status: Not on file    Intimate partner violence:     Fear of current or ex partner: Not on file     Emotionally abused: Not on file     Physically abused: Not on file     Forced sexual activity: Not on file   Other Topics Concern    Not on file   Social History Narrative    68year old  female admitted voluntarily for psyychotic behavior. Pt has paranoid delusions. She is widodef and lives alone. She was discharged from [facility] 3 days ago. PHYSICAL EXAM  Vitals:    04/19/19 0935   BP: 100/78   BP 1 Location: Left arm   BP Patient Position: Sitting   Pulse: 71   Resp: 20   Temp: 98.3 °F (36.8 °C)   TempSrc: Oral   SpO2: 98%   Weight: 61.2 kg (135 lb)   Height: 5' 4\" (1.626 m)       General:  Awake, alert, resting in wheelchair, appears to be in at least moderate discomfort/ pain from leg complaints    Head:  Normocephalic, atraumatic. Eyes:  Conjunctivae/corneas clear   Lungs:  Heart:  Not examined   Not examined   Extremities: No edema.    Skin: No rashes    Neurologic Exam       Language: normal  Memory:  Alert, oriented to person, place, situation    Cranial Nerves:  I: smell Not tested   II: visual fields Full to confrontation   II: pupils Equal, round   II: optic disc Not examined III,VII: ptosis none   III,IV,VI: extraocular muscles  normal   V: facial light touch sensation  normal   VII: facial muscle function  symmetric   VIII: hearing symmetric   IX: soft palate elevation  Not examined, not relevant   XI: sternocleidomastoid strength 5/5   XII: tongue  Not examined, not relevant      Motor:   Upper exts: no myalgias, normal tone, 5/5 strength bilaterally. Lower exts: severe increased tone, + mild tenderness to touch (thighs predominantly), 4 to 4+/5 strength in both legs (pain-limited weakness)    Sensory: intact to LT in all extremities    Cerebellar: no resting tremor  Reflexes: 2+ upper exts; Absent patellars (due to severe rigidity, can feel very tight quads and hamstrings), achilles 1+ symmetric  Plantar response: neutral bilaterally  Gait: pt needed support to stand, says couldn't stand upright, only walked few feet  Romberg unable to assess     Reviewed operative note from Dec 2018      [address] [facility] ICD-9-CM    1. Muscle spasm of both lower legs M62.838 728.85 baclofen (LIORESAL) 10 mg tablet      MRI [facility] SPINE W WO CONT      MRI CERV SPINE W WO CONT      EMG LIMITED      CK   2. Weakness of both legs R29.898 729.89 MRI [facility] SPINE W WO CONT      MRI CERV SPINE W WO CONT      EMG LIMITED      CK       1+ year of progressive bilateral lower extremity muscle cramping, severe. No improvement after lumbar spine surgery for lumbar spinal stenosis (Dec 2018). On exam has severe muscle tightness of quadriceps and hamstrings bilaterally and subsequent limited ROM of legs. No hyperreflexia on exam.  Lower extremity strength is more in line with 4 to 4+/5 but I think it's pain-limited/ related weakness. Will have Dr Tejeda Fees do EMG both legs to rule out myopathic process (vs radicular). Check CK. Check MRI T-spine +/- contrast to rule out thoracic cord lesion or severe thoracic spinal stenosis.   Changed MRI C-spine order to with and without contrast for same reasons. Rx'd Baclofen 10 mg one to two tabs, three times a day for muscle cramping. Common SEFx discussed. Follow up in 6 weeks to go over results. Thank you for allowing me to be a part of your patient's care. Please call me at [phone] if you have any questions.      Sincerely,  Arabella Vazquez MD

## 2019-04-19 NOTE — PROGRESS NOTES
Patient is here for bilateral leg pain. Pain started about last year, and has gotten worse recently. Patient is in a wheelchair.

## 2019-04-20 LAB — CK SERPL-CCNC: 52 U/L (ref 24–173)

## 2019-04-23 ENCOUNTER — TELEPHONE (OUTPATIENT)
Dept: NEUROLOGY | Age: 74
End: 2019-04-23

## 2019-04-23 NOTE — TELEPHONE ENCOUNTER
----- Message from Tae Johnson sent at 4/23/2019  2:14 PM EDT -----  Regarding: Dr. Beto Lester  Patient would like to see If her test results are back.  Contact is [phone]

## 2019-04-23 NOTE — TELEPHONE ENCOUNTER
Left VM for patient to let her know that I did receive her message and I do see where her MRI's were done and now all she has to do is get her EMG (already scheduled) and then she will return for her follow up appt in June to go over results and so Khushi Nomistephan will answer any questions she may have. I stated for her to call back if needed.

## 2019-04-25 ENCOUNTER — HOSPITAL ENCOUNTER (OUTPATIENT)
Dept: MRI IMAGING | Age: 74
Discharge: HOME | End: 2019-04-25
Attending: PSYCHIATRY & NEUROLOGY
Payer: MEDICARE

## 2019-04-25 ENCOUNTER — TELEPHONE (OUTPATIENT)
Dept: NEUROLOGY | Age: 74
End: 2019-04-25

## 2019-04-25 VITALS — WEIGHT: 135 LBS | BODY MASS INDEX: 23.17 KG/M2

## 2019-04-25 DIAGNOSIS — R29.898 WEAKNESS OF BOTH LEGS: ICD-10-CM

## 2019-04-25 DIAGNOSIS — M62.838 MUSCLE SPASM OF BOTH LOWER LEGS: ICD-10-CM

## 2019-04-25 PROCEDURE — 74011250636 HC RX REV CODE- 250/636: Performed by: RADIOLOGY

## 2019-04-25 PROCEDURE — A9575 INJ GADOTERATE MEGLUMI 0.1ML: HCPCS | Performed by: RADIOLOGY

## 2019-04-25 PROCEDURE — 72156 MRI NECK SPINE W/O & W/DYE: CPT

## 2019-04-25 PROCEDURE — 72157 MRI CHEST SPINE W/O & W/DYE: CPT

## 2019-04-25 RX ORDER — GADOTERATE MEGLUMINE 376.9 MG/ML
12 INJECTION INTRAVENOUS
Status: COMPLETED | OUTPATIENT
Start: 2019-04-25 | End: 2019-04-25

## 2019-04-25 RX ADMIN — GADOTERATE MEGLUMINE 12 ML: 376.9 INJECTION INTRAVENOUS at 20:55

## 2019-04-25 NOTE — TELEPHONE ENCOUNTER
----- Message from Dakotah Fox sent at 4/25/2019 10:37 AM EDT -----  Regarding: Dr. Óscar Dwyer  Pt is returning call regarding blood work. [phone].

## 2019-04-25 NOTE — TELEPHONE ENCOUNTER
----- Message from Lindsay Wellington sent at 4/25/2019  1:04 PM EDT -----  Regarding: /Telephone  Pt is requesting a call back from Marcus Marie in reference to blood work. Live [employer] left a message that maybe wrong person. Pt stated she is having 2 mri's tonight. Best contact number is [phone].

## 2019-04-25 NOTE — TELEPHONE ENCOUNTER
Contacted patient to let her know that I did get her message but we do not give results over the telephone but I can certainly send a message to Mylene Hicks to let him know that she would like her blood test results and once I get a message back from him I will let her know. She verbalized understanding. I stated to her that she needs to go ahead and get her MRI's done and also get her EMG done on 5/3/19 at 1:00pm. And then once she has done that I can bring her back in for a sooner follow up visit.

## 2019-04-26 ENCOUNTER — HOSPITAL ENCOUNTER (OUTPATIENT)
Dept: LAB | Age: 74
Discharge: HOME | End: 2019-04-26
Payer: MEDICARE

## 2019-04-26 ENCOUNTER — OFFICE VISIT (OUTPATIENT)
Dept: INTERNAL MEDICINE CLINIC | Age: 74
End: 2019-04-26

## 2019-04-26 ENCOUNTER — TELEPHONE (OUTPATIENT)
Dept: NEUROLOGY | Age: 74
End: 2019-04-26

## 2019-04-26 VITALS
HEART RATE: 86 BPM | OXYGEN SATURATION: 98 % | SYSTOLIC BLOOD PRESSURE: 154 MMHG | TEMPERATURE: 97.7 F | RESPIRATION RATE: 20 BRPM | BODY MASS INDEX: 23.05 KG/M2 | DIASTOLIC BLOOD PRESSURE: 68 MMHG | WEIGHT: 135 LBS | HEIGHT: 64 IN

## 2019-04-26 DIAGNOSIS — Z12.4 PAP SMEAR FOR CERVICAL CANCER SCREENING: ICD-10-CM

## 2019-04-26 DIAGNOSIS — G89.29 CHRONIC BILATERAL LOW BACK PAIN WITHOUT SCIATICA: ICD-10-CM

## 2019-04-26 DIAGNOSIS — H10.31 ACUTE BACTERIAL CONJUNCTIVITIS OF RIGHT EYE: ICD-10-CM

## 2019-04-26 DIAGNOSIS — H53.2 DOUBLE VISION: ICD-10-CM

## 2019-04-26 DIAGNOSIS — E55.9 VITAMIN D DEFICIENCY: Primary | ICD-10-CM

## 2019-04-26 DIAGNOSIS — M54.50 CHRONIC BILATERAL LOW BACK PAIN WITHOUT SCIATICA: ICD-10-CM

## 2019-04-26 PROCEDURE — 36415 COLL VENOUS BLD VENIPUNCTURE: CPT

## 2019-04-26 PROCEDURE — 82306 VITAMIN D 25 HYDROXY: CPT

## 2019-04-26 RX ORDER — GLUCOSAMINE SULFATE 1500 MG
POWDER IN PACKET (EA) ORAL
Qty: 90 CAP | Refills: 1 | Status: SHIPPED | OUTPATIENT
Start: 2019-04-26 | End: 2020-08-24

## 2019-04-26 RX ORDER — LANOLIN ALCOHOL/MO/W.PET/CERES
CREAM (GRAM) TOPICAL
Qty: 90 TAB | Refills: 1 | Status: SHIPPED | OUTPATIENT
Start: 2019-04-26 | End: 2020-08-24

## 2019-04-26 RX ORDER — ERYTHROMYCIN 5 MG/G
OINTMENT OPHTHALMIC
Qty: 3.5 G | Refills: 0 | Status: ON HOLD | OUTPATIENT
Start: 2019-04-26 | End: 2019-10-29

## 2019-04-26 RX ORDER — HYDROCODONE BITARTRATE AND ACETAMINOPHEN 5; 325 MG/1; MG/1
TABLET ORAL
Refills: 0 | COMMUNITY
Start: 2019-03-27 | End: 2019-08-02 | Stop reason: ALTCHOICE

## 2019-04-26 NOTE — PROGRESS NOTES
HISTORY OF PRESENT ILLNESS  Tessa Peterson is a 68 y.o. female. HPI  Patient presents to the office for a number of concerns. She reports she did see the neurologist and is currently still getting evaluated. She is suppose to have an EMG done. She was placed on baclofen but it is not working. So far her muscle testing has been normal. She states she has not been back to see the orthopedic doctor yet. She is still having muscle spasms of her legs. She would like a referral to the eye doctor. She states before moving to [place] she had been on lyrica and it gave her double vision. She states the double vision is better but her vision is still blurry and right now she is having pick eye symptoms. She states her eye has been really red and irritated at times. She would like to get a referral to gyn because she will be due a mammogram and pap smear. She would like to have her vitamin d checked. She does take vitamin d supplement. She states she use to have pain doctor in the past and she would like a referral to one her locally. Lastly she would like a letter stating that she will be moving in with her son. She needs this letter to give to the [facility]. Her lease is up in May and she was told that if she get a doctor note they would let her out her lease in 30 days instead of 60 days. Review of Systems   Eyes: Positive for blurred vision, photophobia and redness. Negative for double vision, pain and discharge. Musculoskeletal: Positive for back pain and myalgias. Blood pressure 154/68, pulse 86, temperature 97.7 °F (36.5 °C), temperature source Oral, resp. rate 20, height 5' 4\" (1.626 m), weight 135 lb (61.2 kg), SpO2 98 %. Physical Exam   Constitutional:   Resting in the wheelchair. Eyes: Pupils are equal, round, and reactive to light. EOM are normal.   Right eye- conjunctivae erythremic, no current drainage or crusting noted. Cardiovascular: Normal rate and regular rhythm.    Murmur heard.  Pulmonary/Chest: Effort normal and breath sounds normal.   Musculoskeletal: She exhibits tenderness. Tenderness noted of lower extremity with lifting legs to put on foot pads of the wheel chair. Psychiatric:   Rambling at times from one subject or thought to another. I had to get her to focus at times on one subject at a time. ASSESSMENT and PLAN  Diagnoses and all orders for this visit:    1. Vitamin D deficiency  -     VITAMIN D, 25 HYDROXY    2. Acute bacterial conjunctivitis of right eye  -     erythromycin (ILOTYCIN) ophthalmic ointment; Apply 1 cm ribbon in right 4 times daily for 7 days    3. Double vision  -     REFERRAL TO OPHTHALMOLOGY    4. Pap smear for cervical cancer screening  -     REFERRAL TO OBSTETRICS AND GYNECOLOGY    5. Chronic bilateral low back pain without sciatica  -     REFERRAL TO PAIN MANAGEMENT    ointment prescribed for the patient. She is to use the medication as prescribed. I have given her a referral to see the eye doctor. She has been given a referral to pain management and gyn. She is to go get labs done today and she will be contact with the results. I have encouraged her to please follow up with neurology and her ortho doctor in hopes they will discover the cause of her muscle cramps of her legs. A letter was written for the patient for her landlord. All this was discussed with the patient and she understands and agrees.

## 2019-04-26 NOTE — LETTER
4/26/2019 1:10 PM 
 
Ms. Ellis Hopes [facility] [address] [facility] [address] [facility] [address] [id number] To whom it may concern: Ms. Maurilio Ortiz states her lease will be expiring in May. She will not be renewing this lease due to health issues. Thank you for this consideration.  
 
 
 
Sincerely, 
 
 
Glenroy Thomas PA-C

## 2019-04-26 NOTE — TELEPHONE ENCOUNTER
Left VM for patient to let her know that Loy Altamirano looked over her labs and her lab for muscle breakdown was normal and if she had any other questions she can call back if needed. What Is The Reason For Today's Visit?: Full Body Skin Examination What Is The Reason For Today's Visit? (Being Monitored For X): concerning skin lesions on an annual basis How Severe Are Your Spot(S)?: mild

## 2019-04-26 NOTE — PROGRESS NOTES
Pain in lower back, legs down to toes. Patient needs a note for her apartment she is not renewing her lease, she is required a 60 day notice but complex will except 30 day with a Dr. Curt Hagan. Patient is looking for pain medication, sleep medication. Patient wants refill of Erythromycin for her eyes.

## 2019-04-26 NOTE — LETTER
4/26/2019 2:22 PM 
 
Ms. Myrna Wyattms [facility] [address] [facility] [address] [id number] 
 
 
 
 
no growth on urine culture. Labs look good. Please see neurology.   
 
 
 
 
 
 
 
 
Sincerely, 
 
 
Matilda Thomas PA-C

## 2019-04-27 LAB — 25(OH)D3+25(OH)D2 SERPL-MCNC: 26.3 NG/ML (ref 30–100)

## 2019-04-30 NOTE — PROGRESS NOTES
Writer contacted patient to inform of lab results and instruction per David Bello, patient verbalized understanding and informed writer she is currently in [facility], patient had a seizure and had been admitted.

## 2019-05-03 DIAGNOSIS — M62.838 MUSCLE SPASM OF BOTH LOWER LEGS: ICD-10-CM

## 2019-05-03 DIAGNOSIS — R29.898 WEAKNESS OF BOTH LEGS: ICD-10-CM

## 2019-05-10 NOTE — PROGRESS NOTES
Pt currently in rehab after having seizure which prompted hospital admission at [address] [place] [address], now in [address] after undergoing ACDF. I talked with Rehab Doc / Radha Moreno and he says her providers at [facility] are aware of the neck mass and have determined it to be goiter.

## 2019-06-05 ENCOUNTER — TELEPHONE (OUTPATIENT)
Dept: NEUROLOGY | Age: 74
End: 2019-06-05

## 2019-06-05 NOTE — TELEPHONE ENCOUNTER
I called patient today to reschedule 6/7/19 appt. At that time the patient wanted  Me to send a message back to let the Dr. Fletcher Morales that the patient hasn't been able to get her EMG yet due to being in the hospital for two weeks. Patient was at her son's house and had 6 seizures and went to [facility]. Patient stated that she has never had seizures before. Patient also stated that she had surgery on her neck by Diony Martinez.

## 2019-06-11 NOTE — TELEPHONE ENCOUNTER
Noted; I had spoken with the Rehab Doc when she was at [facility] and he relayed that info to me. Pt can cancel the EMG appt as they figured out the cause of her legs being weak (due to neck issues). She can schedule follow up appt to discuss the new onset seizures. We will need the discharge summary from [facility] so I can review that.

## 2019-06-11 NOTE — TELEPHONE ENCOUNTER
Contacted patient to let her know per Khushi Lee \"I had spoken with the Rehab Doc when she was at [facility] and he relayed that info to me. Pt can cancel the EMG appt as they figured out the cause of her legs being weak (due to neck issues). She can schedule follow up appt to discuss the new onset seizures. We will need the discharge summary from [facility] so I can review that. \"     She verbalized understanding and stated that she can't make it into the office this week but she already has an appt next week so she will be there next week for her appt. I verbalized understanding with her.

## 2019-06-20 ENCOUNTER — OFFICE VISIT (OUTPATIENT)
Dept: NEUROLOGY | Age: 74
End: 2019-06-20

## 2019-06-20 VITALS
DIASTOLIC BLOOD PRESSURE: 68 MMHG | BODY MASS INDEX: 23.05 KG/M2 | OXYGEN SATURATION: 99 % | RESPIRATION RATE: 20 BRPM | WEIGHT: 135 LBS | SYSTOLIC BLOOD PRESSURE: 100 MMHG | HEIGHT: 64 IN | HEART RATE: 69 BPM

## 2019-06-20 DIAGNOSIS — M96.1 LUMBAR POST-LAMINECTOMY SYNDROME: ICD-10-CM

## 2019-06-20 DIAGNOSIS — M79.605 BILATERAL LEG PAIN: ICD-10-CM

## 2019-06-20 DIAGNOSIS — R56.9 NEW ONSET SEIZURE (HCC): ICD-10-CM

## 2019-06-20 DIAGNOSIS — M62.838 MUSCLE SPASTICITY: Primary | ICD-10-CM

## 2019-06-20 DIAGNOSIS — G95.9 CERVICAL MYELOPATHY (HCC): ICD-10-CM

## 2019-06-20 DIAGNOSIS — M79.604 BILATERAL LEG PAIN: ICD-10-CM

## 2019-06-20 RX ORDER — PANTOPRAZOLE SODIUM 40 MG/1
TABLET, DELAYED RELEASE ORAL
Refills: 0 | COMMUNITY
Start: 2019-05-24 | End: 2020-01-10 | Stop reason: ALTCHOICE

## 2019-06-20 RX ORDER — PREGABALIN 50 MG/1
50 CAPSULE ORAL 2 TIMES DAILY
Qty: 60 CAP | Refills: 1 | Status: SHIPPED | OUTPATIENT
Start: 2019-06-20 | End: 2020-08-24

## 2019-06-20 RX ORDER — DIVALPROEX SODIUM 500 MG/1
500 TABLET, DELAYED RELEASE ORAL 2 TIMES DAILY
Qty: 60 TAB | Refills: 5 | Status: SHIPPED | OUTPATIENT
Start: 2019-06-20 | End: 2020-01-10 | Stop reason: SDUPTHER

## 2019-06-20 RX ORDER — DIVALPROEX SODIUM 500 MG/1
TABLET, DELAYED RELEASE ORAL
Refills: 0 | COMMUNITY
Start: 2019-05-24 | End: 2019-06-20

## 2019-06-20 NOTE — PROGRESS NOTES
Patient is here for a follow up for bilateral leg pain and new onset of seizures. She states that she has been having the constant leg pain and while she was in the hospital she has multiple seizures.

## 2019-06-20 NOTE — PROGRESS NOTES
Neurology Progress Note    Patient ID:   Alon Sánchez  [id number]  68 y.o.  1945    Date of Office Visit: 06/20/19    Chief Complaint   Patient presents with    Leg Pain     Follow Up     Seizure     Follow Up (New Onset of Seizures)     Interval Hx:     Pt following up to discuss new-onset seizure and leg pains. See initial note (4-19-19) for initial complaints. Granddaughter accompanies her to help with history. Briefly. I ordered MRI C-spine and T-spine at her initial visit to evaluate for cervical or thoracic myelopathy (severe spasticity in legs). MRI C-spine showed severe multi-level spinal stenosis (C4-5, C5-6, C6-7) with myelomalacia at C6-7. MRI C-spine also showed a large enhancing mass on the left side of her neck near the left carotid space. MRI T-spine showed mild exaggerated kyphosis, no thoracic spinal stenosis, no thoracic cord lesions, + ventral osteophytes suggesting DISH (spondylosis), post-surgical changes at L4-5 with posterior spinal fusion. Given Rx Baclofen 10 mg one to two tabs TID for the spasticity. Granddaughter/ pt says that she never filled the Baclofen but the day after her MRIs she had multiple seizures, fell down broke her nose. Taken to [address] [place] [address] and G-daughter says she had seizure there, was intubated and started on Depakote. Granddaughter says that before each of the seizures she had some stuttering type speech then went into the seizure. Never had any seizures before but granddaughter says that maybe year ago she had some brief stuttering but nothing else happened. She's also tried Baclofen in the past (Rx'd by Orthopedic) but didn't cause any seizure. Since all of the above, patient still has severe spasticity in both legs, both legs feel weak (mostly due to stiffness), and complains that she has severe lower back pain and pain down both legs.   She underwent L-spien surgery in Dec 2018/ Dr [facility]) for severe multi-level lumbar spinal stenosis. She denies any upper extremity pain or weakness. She reports trying/ failing: gabapentin and cymbalta for her chronic back pain and leg pains in the past.  She thinks she may have tried low-dose lyrica in the past and it caused double vision so she stopped taking it. When asked about this further, she says that she stopped taking it, saw an Ophthalmologist who told her the double vision may last up to 1 month after stopping the Lyrica. I d/w patient-g/daughter that the SEFx of Lyrica should be gone by 7-10 days after stopping it and the SEFx wouldn't last 1 month after stopping it. Objective:     No Known Allergies    PMHx:  Past Medical History:   Diagnosis Date    Acquired spondylolisthesis     Anxiety     Arrhythmia     PROLONGED QT EKG 7-26-18    Arthritis     Bipolar 1 disorder [facility])     son not sure where patient was diagnosed(PT. DENIES 12-7-18)    Chronic pain     Degeneration of lumbar intervertebral disc     Diabetes ([facility] [address]     Diverticulosis of large intestine 2018    sigmoid & descending    Heart murmur     Hypertension     HX HTN - NO MEDS CURRENTLY(12-7-18)    Prolonged Q-T interval on ECG 12/10/2018    Pseudoclaudication     Thyroid disease     GOITER     PSHx:  has a past surgical history that includes hx total vaginal hysterectomy (1986); hx open cholecystectomy (1986); hx hysterectomy (1986); hx lumbar laminectomy (12/14/2018); and hx lumbar fusion (12/14/2018). Current Outpatient Medications on File Prior to Visit   Medication Sig    pantoprazole (PROTONIX) 40 mg tablet     cholecalciferol (VITAMIN D3) 1,000 unit cap TAKE 1 CAPSULE BY MOUTH EVERY DAY    cyanocobalamin 1,000 mcg tablet TAKE 1 TABLET BY MOUTH EVERY DAY    baclofen (LIORESAL) 10 mg tablet Take one to two tablets, three times a day. For severe muscle cramping and spasms in legs.  multivitamin (ONE A DAY) tablet Take 1 Tab by mouth daily.     melatonin tab tablet Take 10 mg by mouth nightly as needed.  OTHER Bedside commode    HYDROcodone-acetaminophen (NORCO) 5-325 mg per tablet TAKE 1 TABLET BY MOUTH EVERY 4 TO 6 HOURS AS NEEDED    erythromycin (ILOTYCIN) ophthalmic ointment Apply 1 cm ribbon in right 4 times daily for 7 days     No current facility-administered medications on file prior to visit. Physical Exam  Vitals:    06/20/19 1534   BP: 100/68   Pulse: 69   Resp: 20   SpO2: 99%   Weight: 61.2 kg (135 lb)   Height: 5' 4\" (1.626 m)       General:   Mental status: Awake, alert, cooperative, appears in moderate to severe pain (points to lower back and legs). Neck: supple; large left sided goiter  Extremities: no edema  Skin: no rashes    Neuro Exam:    CNs:   Facial movements: symmetric. Wearing sunglasses in the room so EOM not visualized but VF normal in all 4 quadrants. Hearing: normal  Speech: no aphasia, no dysarthria, normal fluency    Motor: upper exts: no spasticity and normal strenght. Lower exts: moderate-severe spasticity and 2-3/5 strength (pain limited)  Coordination: No tremors  Sensory: hyperalgesia to LT in both legs up to waist, normal above. Reflexes:  1+ patellars, 2+ biceps  Gait: slow to stand (back pain, spasticity related), ambulates few steps with assistance    Assessment:       ICD-10-CM ICD-9-CM    1. Muscle spasticity M62.838 728.85 REFERRAL TO PHYSIATRY   2. New onset seizure (HCC) R56.9 780.39 divalproex DR (DEPAKOTE) 500 mg tablet   3. Cervical myelopathy (HCC) G95.9 721.1 pregabalin (LYRICA) 50 mg capsule      REFERRAL TO PHYSIATRY   4. Lumbar post-laminectomy syndrome M96.1 722.83    5. Bilateral leg pain M79.604 729.5 REFERRAL TO PHYSIATRY    M79.605       1) Spasticity: [facility] Physiatrist to discuss treatment options for lower extremity spasticity (? botox injections). I do not favor using baclofen as pt had recent seizures and it's possible that baclofen can lower seizure threshold.      2) Severe low back pain/ bilateral leg pain: d/w patient that the leg pain could be lumbar post-laminectomy syndrome or from her cervical myelopathy. Due to persistence of back pain after back surgery, recommended she follow up with her back surgeon or PCP and ask for referral to pain management. 3) New-onset seizure  Continue Depakote 500 mg BID (sent new Rx).  Will request discharge summary from [address] [place] [address] to review results of EEG, MRI Brain    4) Cervical myelopathy/ leg weakness/ spasticity: d/w patient that cannot predict how much recovery she will have as there was/ is bruising of the spinal cord (effectively a spinal cord injury) though it's possible there will be some improvement with time and if she can get her back pain controlled    5) F/u in 6 months    Signed:   Jil Donnelly MD  June 20, 2019

## 2019-08-02 RX ORDER — DICLOFENAC SODIUM 10 MG/G
GEL TOPICAL
Refills: 0 | COMMUNITY
Start: 2019-05-28 | End: 2020-08-24

## 2019-08-05 ENCOUNTER — OFFICE VISIT (OUTPATIENT)
Dept: INTERNAL MEDICINE CLINIC | Age: 74
End: 2019-08-05

## 2019-08-05 ENCOUNTER — HOSPITAL ENCOUNTER (OUTPATIENT)
Dept: LAB | Age: 74
Discharge: HOME | End: 2019-08-05
Payer: MEDICARE

## 2019-08-05 ENCOUNTER — TELEPHONE (OUTPATIENT)
Dept: INTERNAL MEDICINE CLINIC | Age: 74
End: 2019-08-05

## 2019-08-05 VITALS
DIASTOLIC BLOOD PRESSURE: 53 MMHG | RESPIRATION RATE: 20 BRPM | HEART RATE: 65 BPM | HEIGHT: 64 IN | OXYGEN SATURATION: 98 % | BODY MASS INDEX: 21.34 KG/M2 | WEIGHT: 125 LBS | SYSTOLIC BLOOD PRESSURE: 117 MMHG | TEMPERATURE: 98 F

## 2019-08-05 DIAGNOSIS — Z12.31 ENCOUNTER FOR SCREENING MAMMOGRAM FOR BREAST CANCER: ICD-10-CM

## 2019-08-05 DIAGNOSIS — Z13.820 SCREENING FOR OSTEOPOROSIS: ICD-10-CM

## 2019-08-05 DIAGNOSIS — G89.29 CHRONIC BILATERAL LOW BACK PAIN WITHOUT SCIATICA: ICD-10-CM

## 2019-08-05 DIAGNOSIS — Z00.00 MEDICARE ANNUAL WELLNESS VISIT, SUBSEQUENT: Primary | ICD-10-CM

## 2019-08-05 DIAGNOSIS — Z13.220 SCREENING CHOLESTEROL LEVEL: ICD-10-CM

## 2019-08-05 DIAGNOSIS — Z13.5 SCREENING FOR EYE CONDITION: ICD-10-CM

## 2019-08-05 DIAGNOSIS — M54.50 CHRONIC BILATERAL LOW BACK PAIN WITHOUT SCIATICA: ICD-10-CM

## 2019-08-05 DIAGNOSIS — Z86.39 HISTORY OF DIABETES MELLITUS, TYPE II: ICD-10-CM

## 2019-08-05 DIAGNOSIS — Z11.59 ENCOUNTER FOR HEPATITIS C SCREENING TEST FOR LOW RISK PATIENT: ICD-10-CM

## 2019-08-05 DIAGNOSIS — Z12.11 COLON CANCER SCREENING: ICD-10-CM

## 2019-08-05 PROCEDURE — 82043 UR ALBUMIN QUANTITATIVE: CPT

## 2019-08-05 PROCEDURE — 80061 LIPID PANEL: CPT

## 2019-08-05 PROCEDURE — 86803 HEPATITIS C AB TEST: CPT

## 2019-08-05 PROCEDURE — 36415 COLL VENOUS BLD VENIPUNCTURE: CPT

## 2019-08-05 NOTE — PATIENT INSTRUCTIONS
Medicare Wellness Visit, Female The best way to live healthy is to have a lifestyle where you eat a well-balanced diet, exercise regularly, limit alcohol use, and quit all forms of tobacco/nicotine, if applicable. Regular preventive services are another way to keep healthy. Preventive services (vaccines, screening tests, monitoring & exams) can help personalize your care plan, which helps you manage your own care. Screening tests can find health problems at the earliest stages, when they are easiest to treat. [facility] follows the current, evidence-based guidelines published by the [facility]il Genna ([facility]) when recommending preventive services for our patients. Because we follow these guidelines, sometimes recommendations change over time as research supports it. (For example, mammograms used to be recommended annually. Even though Medicare will still pay for an annual mammogram, the newer guidelines recommend a mammogram every two years for women of average risk.) Of course, you and your doctor may decide to screen more often for some diseases, based on your risk and your health status. Preventive services for you include: - Medicare offers their members a free annual wellness visit, which is time for you and your primary care provider to discuss and plan for your preventive service needs. Take advantage of this benefit every year! 
-All adults over the age of 72 should receive the recommended pneumonia vaccines. Current USPSTF guidelines recommend a series of two vaccines for the best pneumonia protection.  
-All adults should have a flu vaccine yearly and a tetanus vaccine every 10 years. All adults age 61 and older should receive a shingles vaccine once in their lifetime.   
-A bone mass density test is recommended when a woman turns 65 to screen for osteoporosis. This test is only recommended one time, as a screening. Some providers will use this same test as a disease monitoring tool if you already have osteoporosis. -All adults age 38-68 who are overweight should have a diabetes screening test once every three years.  
-Other screening tests and preventive services for persons with diabetes include: an eye exam to screen for diabetic retinopathy, a kidney function test, a foot exam, and stricter control over your cholesterol.  
-Cardiovascular screening for adults with routine risk involves an electrocardiogram (ECG) at intervals determined by your doctor.  
-Colorectal cancer screenings should be done for adults age 54-65 with no increased risk factors for colorectal cancer. There are a number of acceptable methods of screening for this type of cancer. Each test has its own benefits and drawbacks. Discuss with your doctor what is most appropriate for you during your annual wellness visit. The different tests include: colonoscopy (considered the best screening method), a fecal occult blood test, a fecal DNA test, and sigmoidoscopy. -Breast cancer screenings are recommended every other year for women of normal risk, age 54-69. 
-Cervical cancer screenings for women over age 72 are only recommended with certain risk factors.  
-All adults born between [facility] should be screened once for Hepatitis C. Here is a list of your current Health Maintenance items (your personalized list of preventive services) with a due date: 
Health Maintenance Due Topic Date Due  
 Hepatitis C Test  1945 [facility] Diabetic Foot Care  07/26/1955  Albumin Urine Test  07/26/1955 [facility] Eye Exam  07/26/1955  DTaP/Tdap/Td  (1 - Tdap) 07/26/1966  Shingles Vaccine (1 of 2) 07/26/1995  Mammogram  07/26/1995  Stool testing for trace blood  07/26/1995  Glaucoma Screening   07/26/2010  Bone Mineral Density   07/26/2010  Pneumococcal Vaccine (1 of 2 - PCV13) 07/26/2010 [facility] Annual Well Visit  07/05/2018  Cholesterol Test   07/27/2019  Flu Vaccine  08/01/2019

## 2019-08-05 NOTE — PROGRESS NOTES
This is the Subsequent Medicare Annual Wellness Exam, performed 12 months or more after the Initial AWV or the last Subsequent AWV    I have reviewed the patient's medical history in detail and updated the computerized patient record. History     Past Medical History:   Diagnosis Date    Acquired spondylolisthesis     Anxiety     Arrhythmia     PROLONGED QT EKG 7-26-18    Arthritis     Bipolar 1 disorder [facility])     son not sure where patient was diagnosed(PT. DENIES 12-7-18)    Chronic pain     Degeneration of lumbar intervertebral disc     Diabetes ([facility] [address]     Diverticulosis of large intestine 2018    sigmoid & descending    Heart murmur     Hypertension     HX HTN - NO MEDS CURRENTLY(12-7-18)    Prolonged Q-T interval on ECG 12/10/2018    Pseudoclaudication     Thyroid disease     GOITER      Past Surgical History:   Procedure Laterality Date    HX HYSTERECTOMY  1986    HX LUMBAR FUSION  12/14/2018    L4-5    HX LUMBAR LAMINECTOMY  12/14/2018    L2-5    HX OPEN CHOLECYSTECTOMY  1986    3 months after childbirth    HX TOTAL VAGINAL HYSTERECTOMY  1986    ovaries out also - heavy bleeding      Current Outpatient Medications   Medication Sig Dispense Refill    VOLTAREN 1 % gel APPLY ONE APPLICATION TOPICALLY QID  0    pantoprazole (PROTONIX) 40 mg tablet   0    pregabalin (LYRICA) 50 mg capsule Take 1 Cap by mouth two (2) times a day. Max Daily Amount: 100 mg. Indications: Nerve Pain from Spinal Cord Injury 60 Cap 1    divalproex DR (DEPAKOTE) 500 mg tablet Take 1 Tab by mouth two (2) times a day. Anti-seizure medication 60 Tab 5    cholecalciferol (VITAMIN D3) 1,000 unit cap TAKE 1 CAPSULE BY MOUTH EVERY DAY 90 Cap 1    cyanocobalamin 1,000 mcg tablet TAKE 1 TABLET BY MOUTH EVERY DAY 90 Tab 1    erythromycin (ILOTYCIN) ophthalmic ointment Apply 1 cm ribbon in right 4 times daily for 7 days 3.5 g 0    baclofen (LIORESAL) 10 mg tablet Take one to two tablets, three times a day.  For severe muscle cramping and spasms in legs. 180 Tab 1    multivitamin (ONE A DAY) tablet Take 1 Tab by mouth daily.  melatonin tab tablet Take 10 mg by mouth nightly as needed.  OTHER Bedside commode 1 Each 0     No Known Allergies  Family History   Problem Relation Age of Onset    Cancer Mother         bone cancer    Heart Failure Father     Cancer Sister         GI    Anesth Problems Neg Hx      Social History     Tobacco Use    Smoking status: Never Smoker    Smokeless tobacco: Never Used   Substance Use Topics    Alcohol use: No     Patient Active Problem List   Diagnosis Code    Bipolar disorder ([facility]) F31.9    Hyperlipidemia E78.5    Essential hypertension I10    Diverticulosis K57.90    Type 2 diabetes mellitus without complication, without long-term current use of insulin ([facility]) E11.9    Spinal stenosis M48.00    Thyroid nodule E04.1    Heart murmur on physical examination R01.1    Prolonged Q-T interval on ECG R94.31    Spinal stenosis, lumbar M48.061       Depression Risk Factor Screening:   No flowsheet data found. Alcohol Risk Factor Screening: You do not drink alcohol or very rarely. Functional Ability and Level of Safety:   Hearing Loss  Hearing is good. Activities of Daily Living  The home contains: She is confined to the wheelchair most time. She also have a scooter. In her room she has a portal toilet seat. and handrail. Patient does total self care    Fall Risk  Fall Risk Assessment, last 12 mths 8/5/2019   Able to walk? Yes   Fall in past 12 months? Yes   Fall with injury?  Yes   Number of falls in past 12 months 1   Fall Risk Score 2       Abuse Screen  Patient is not abused    Cognitive Screening   Evaluation of Cognitive Function:  Has your family/caregiver stated any concerns about your memory: no  Normal    Patient Care Team   Patient Care Team:  Charmayne Smaller, PA-C as PCP - General (Physician Assistant)  Sirisha Corbin MD (Neurology)    Assessment/Plan Education and counseling provided:  Are appropriate based on today's review and evaluation  Screening Mammography  Bone mass measurement (DEXA)  Screening for glaucoma  patient declined a colonoscopy but is open to using the cologuard    Diagnoses and all orders for this visit:    1. Medicare annual wellness visit, subsequent    2. Chronic bilateral low back pain without sciatica    3. Screening for eye condition  -     REFERRAL TO OPHTHALMOLOGY    4. Screening for osteoporosis  -     DEXA BONE DENSITY STUDY AXIAL; Future    5. Screening cholesterol level  -     LIPID PANEL    6. Encounter for screening mammogram for breast cancer  -     MELODY MAMMO BI SCREENING INCL CAD; Future    7. Encounter for hepatitis C screening test for low risk patient  -     HEPATITIS C AB    8. History of diabetes mellitus, type II  -     MICROALBUMIN, UR, RAND W/ MICROALB/CREAT RATIO    9. Colon cancer screening  -     COLOGUARD TEST (FECAL DNA COLORECTAL CANCER SCREENING)      A number of labs and referrals placed for the patient today. She is not interested in getting the shingles vaccine. She states she got her pneumonia vaccine in [place]. We will try to get this for her record. She does not want a colonoscopy but she would be willing to do the cologuard. I have encouraged her to see the pain specialist again. She will be reaching out to them again. I will contact her with the results once back of her labs and testing. All this was discussed with the patient and she understands and agrees.     Health Maintenance Due   Topic Date Due    Hepatitis C Screening  1945    FOOT EXAM Q1  07/26/1955    MICROALBUMIN Q1  07/26/1955    EYE EXAM RETINAL OR DILATED  07/26/1955    DTaP/Tdap/Td series (1 - Tdap) 07/26/1966    Shingrix Vaccine Age 50> (1 of 2) 07/26/1995    BREAST CANCER SCRN MAMMOGRAM  07/26/1995    FOBT Q 1 YEAR AGE 50-75  07/26/1995    GLAUCOMA SCREENING Q2Y  07/26/2010    Bone Densitometry (Dexa) Screening 07/26/2010    Pneumococcal 65+ years (1 of 2 - PCV13) 07/26/2010    MEDICARE YEARLY EXAM  07/05/2018    LIPID PANEL Q1  07/27/2019    Influenza Age 9 to Adult  08/01/2019

## 2019-08-06 LAB
ALBUMIN/CREAT UR: 49.3 MG/G CREAT (ref 0–30)
CHOLEST SERPL-MCNC: 209 MG/DL (ref 100–199)
CREAT UR-MCNC: 35.9 MG/DL
HCV AB S/CO SERPL IA: <0.1 S/CO RATIO (ref 0–0.9)
HDLC SERPL-MCNC: 49 MG/DL
INTERPRETATION, 910389: NORMAL
LDLC SERPL CALC-MCNC: 142 MG/DL (ref 0–99)
MICROALBUMIN UR-MCNC: 17.7 UG/ML
TRIGL SERPL-MCNC: 89 MG/DL (ref 0–149)
VLDLC SERPL CALC-MCNC: 18 MG/DL (ref 5–40)

## 2019-08-08 NOTE — PROGRESS NOTES
Please let the patient know her cholesterol is a little elevated. She should eliminate foods high in saturated fats and sugar. Eat foods high in fiber and omega 3's I would like to recheck these numbers in 6 months.

## 2019-08-16 ENCOUNTER — TELEPHONE (OUTPATIENT)
Dept: INTERNAL MEDICINE CLINIC | Age: 74
End: 2019-08-16

## 2019-08-16 NOTE — TELEPHONE ENCOUNTER
Dr. Shannan Ngo contact me today about Ms. Piero Torrez. She wanted me to be aware that she was suppose to see her today in reference to her back pain but instead has sent her to the ER. She states Ms. Piero Torrez was having spastic pain of her lower extremity and she was not able to walk. Dr. Lata Lewis states she had reviewed her chart and noticed that the MRI stated she had a mass of her neck that was originally thought to have been a goiter but imaging suggested something different. ( Avidly enhancing large left neck mass centered in the left carotid space as  described above measuring 6.4 x 4.6 x 7.9 cm. Differential is schwannoma  (favored) versus paraganglioma. Ultrasound guided biopsy should be considered as  clinically indicated. This is felt unlikely to represent a thyroid nodule as  previously thought)  I have looked at the chart and it appears Dr Khoa Cruz called on 5/10.2019  (Pt currently in rehab after having seizure which prompted hospital admission at [address] [place] [address], now in [facility] after undergoing ACDF.  I talked with Rehab Doc / Livan Girard and he says her providers at [facility] are aware of the neck mass and have determined it to be goiter.) I will follow up her ER visit to make sure the mass seen on MRI was addressed. This is the first I have heard about this. Dr. Lata Lewis is concerned that perhaps the mass is pressing on her carotid and that is why she had a seizure. I told Dr. Lata Lewis thank you for letting me know and I will let her know what I found out about this.

## 2019-08-22 ENCOUNTER — PATIENT OUTREACH (OUTPATIENT)
Dept: INTERNAL MEDICINE CLINIC | Age: 74
End: 2019-08-22

## 2019-08-22 NOTE — PROGRESS NOTES
ED Discharge Follow-Up    Date/Time:     2019 12:02 PM    Patient presented to [facility]  ED on  and left AMA prior to any imaging or other tests. Patient reports Dr. Genesis Ayers called [facility] ED to discuss patient's case prior to patient's arrival, but the ED said they did not know anything about the patient. After waiting a while, she decided to leave because they had not completed any work up. She has scheduled an appointment to see Tarik Santos on  to discuss updates and possibly get a note from Tarik Santos if further workup is needed. It sounds like she has had labs done that were normal and is waiting to schedule an MRI. Instructed her to bring any results reports to her upcoming appointment. Top Challenges reviewed with the provider   Neurosurgeon Dr. Audie Vega has ordered an MRI-patient has not received a call yet to schedule, so she will call today. Appointment scheduled  with Dr. Jason Chávez at [facility] for consult for Botox injections for leg/back pain. Appointment scheduled  for an eye exam d/t blurred/impaired vision in R eye. Patient wants multiple opinions before proceeding with biopsy/surgery. Method of communication with provider :chart routing    Nurse Navigator(NN) contacted the  patient  by telephone to perform post ED discharge assessment. Verified name and  with patient as identifiers. Provided introduction to self, and explanation of the Nurse Navigator role. Patient reported assessment: Patient reports significant pain at times in her legs and back. Intermittent blurred/impaired vision of R eye. She wonders if her symptoms are r/t the neck mass, considering new seizures as well. She is followed by neurology and neurosurgery-has a hx of back surgery and reports this did not resolve her pain. Her mobility is worse and she is hopeful that this will improve if she has surgery to remove the neck mass.  She is hesitant to pursue surgery until obtaining multiple provider opinions. Medication(s):   New Medications at Discharge: none  Changed Medications at Discharge: none  Discontinued Medications at Discharge: none     Patient given an opportunity to ask questions and does not have any further questions or concerns at this time. The patient agrees to contact the PCP office for questions related to their healthcare. Offered follow up appointment with PCP: Previously scheduled next week   [facility] follow up appointment(s):   Future Appointments   Date Time Provider Bhaskar Oliva   8/26/2019  1:00 PM Lynda Thomas PA-C [facility] SCHED   12/18/2019  1:20 PM MD Jorge Boggs 27      Non-[facility] follow up appointment(s): MRI TBD. Physiatrist Dr. Heather Mclaughlin 9/16. Eye doctor 9/5. [email or web address]:  n/a    www. [place] 9 AM- 9 PM.   Phone [phone]      Goals    None

## 2019-08-26 ENCOUNTER — OFFICE VISIT (OUTPATIENT)
Dept: INTERNAL MEDICINE CLINIC | Age: 74
End: 2019-08-26

## 2019-08-26 ENCOUNTER — TELEPHONE (OUTPATIENT)
Dept: INTERNAL MEDICINE CLINIC | Age: 74
End: 2019-08-26

## 2019-08-26 ENCOUNTER — HOSPITAL ENCOUNTER (OUTPATIENT)
Dept: LAB | Age: 74
Discharge: HOME | End: 2019-08-26
Payer: MEDICARE

## 2019-08-26 VITALS
RESPIRATION RATE: 20 BRPM | OXYGEN SATURATION: 98 % | DIASTOLIC BLOOD PRESSURE: 56 MMHG | SYSTOLIC BLOOD PRESSURE: 121 MMHG | HEART RATE: 63 BPM | WEIGHT: 125 LBS | HEIGHT: 64 IN | TEMPERATURE: 97.7 F | BODY MASS INDEX: 21.34 KG/M2

## 2019-08-26 DIAGNOSIS — R22.1 NECK MASS: Primary | ICD-10-CM

## 2019-08-26 DIAGNOSIS — R25.2 LEG CRAMPING: ICD-10-CM

## 2019-08-26 PROCEDURE — 36415 COLL VENOUS BLD VENIPUNCTURE: CPT

## 2019-08-26 PROCEDURE — 80053 COMPREHEN METABOLIC PANEL: CPT

## 2019-08-26 NOTE — PROGRESS NOTES
HISTORY OF PRESENT ILLNESS  Danney Cushing is a 76 y.o. female. HPI  Patient presents to the office to follow an ER visit. She reports she was sent from the pain specialist office to the ER. She reports she did not received treatment at the ER because they did not seem to know why she was there and they got tired of waiting. She is here today with her son. He states the pain doctor had also wanted her to have some labs done to see if the spasms was due from electrolyte imbalance. She reports she has had this mass for over a year now. She was told at first it was related to her thyroid but recent MRI results have shown something different. She states the mass does not stop her from swallowing and it is not painful. Review of Systems   Constitutional: Negative. HENT: Negative. Eyes: Negative. Respiratory: Negative. Cardiovascular: Negative. Gastrointestinal: Negative. Musculoskeletal: Positive for back pain. Negative for neck pain. Skin: Negative. Neurological: Positive for weakness. Endo/Heme/Allergies: Negative. Psychiatric/Behavioral: Negative. Blood pressure 121/56, pulse 63, temperature 97.7 °F (36.5 °C), temperature source Oral, resp. rate 20, height 5' 4\" (1.626 m), weight 125 lb (56.7 kg), SpO2 98 %. Physical Exam   Constitutional:   Pleasant resting in the wheelchair. Neck:   Large palpable soft mass noted just to the left of midline of neck. The area is non tender. Cardiovascular: Normal rate and regular rhythm. No murmur heard. Pulmonary/Chest: Effort normal and breath sounds normal.   Psychiatric: She has a normal mood and affect. Her behavior is normal. Judgment and thought content normal.       ASSESSMENT and PLAN  Diagnoses and all orders for this visit:    1. Neck mass    2. Leg cramping  -     METABOLIC PANEL, COMPREHENSIVE      I explained to the patient and her son my concern about the mass and the concerns of Dr. Muhammad Learn as well.  This is something that should be address as soon as possible. I will call Dr. Montague Bring office to see when he would be able to see her. Meanwhile she is going to get a lab done for me. I will reach out to Dr. Link Poag office to let her know about her appointment with ENT. All this was discussed with the patient and she understands and agrees. (September 6 at 1:00 is her appointment with ENT)  Total encounter time was 25 minutes; >50 % of time was spent counseling/coordinating care regarding patient neck mass and leg spasms

## 2019-08-26 NOTE — TELEPHONE ENCOUNTER
Please let the patient know she will be seeing Dr. Nydia Mejia on September 6 at 1:00.  They will be working her in. thanks

## 2019-08-26 NOTE — LETTER
8/26/2019 3:26 PM 
 
Ms. Hodges Flow [address] [place] [id number] Ms. Dayne Romero, As discussed on phone Dr. José Luis Gonzalez has fit you into his schedule, so please make this appointment. If for some reason you cannot make this appointment please call their office to reschedule.  
 
 
 
 
 
Sincerely, 
 
 
Fatemeh Thomas PA-C

## 2019-08-27 LAB
ALBUMIN SERPL-MCNC: 4.2 G/DL (ref 3.5–4.8)
ALBUMIN/GLOB SERPL: 1.8 {RATIO} (ref 1.2–2.2)
ALP SERPL-CCNC: 57 IU/L (ref 39–117)
ALT SERPL-CCNC: 8 IU/L (ref 0–32)
AST SERPL-CCNC: 8 IU/L (ref 0–40)
BILIRUB SERPL-MCNC: 0.3 MG/DL (ref 0–1.2)
BUN SERPL-MCNC: 21 MG/DL (ref 8–27)
BUN/CREAT SERPL: 39 (ref 12–28)
CALCIUM SERPL-MCNC: 9.5 MG/DL (ref 8.7–10.3)
CHLORIDE SERPL-SCNC: 108 MMOL/L (ref 96–106)
CO2 SERPL-SCNC: 23 MMOL/L (ref 20–29)
CREAT SERPL-MCNC: 0.54 MG/DL (ref 0.57–1)
GLOBULIN SER CALC-MCNC: 2.3 G/DL (ref 1.5–4.5)
GLUCOSE SERPL-MCNC: 81 MG/DL (ref 65–99)
POTASSIUM SERPL-SCNC: 4.1 MMOL/L (ref 3.5–5.2)
PROT SERPL-MCNC: 6.5 G/DL (ref 6–8.5)
SODIUM SERPL-SCNC: 147 MMOL/L (ref 134–144)

## 2019-08-27 NOTE — PROGRESS NOTES
Please contact the patient and let her know her sodium is a little high. Increase water intake. (not Gatorade or power drinks)Rest of cmp is okay. Recheck in 7-10 days she can call for a lab slip.

## 2019-08-27 NOTE — PROGRESS NOTES
Writer contacted patient to inform of lab results and instruction per Inge Sanchez, patient verbalized understanding. Patient would like future lab slip mailed to her, will request from Inge Sanchez.

## 2019-08-30 ENCOUNTER — TELEPHONE (OUTPATIENT)
Dept: INTERNAL MEDICINE CLINIC | Age: 74
End: 2019-08-30

## 2019-08-30 DIAGNOSIS — E87.0 SERUM SODIUM ELEVATED: Primary | ICD-10-CM

## 2019-08-30 NOTE — TELEPHONE ENCOUNTER
----- Message from Rock Janine LPN sent at 9/14/5817  9:47 AM EDT -----  Writer contacted patient to inform of lab results and instruction per Joe Ambriz, patient verbalized understanding. Patient would like future lab slip mailed to her, will request from Joe Ambriz.

## 2019-09-11 ENCOUNTER — TELEPHONE (OUTPATIENT)
Dept: INTERNAL MEDICINE CLINIC | Age: 74
End: 2019-09-11

## 2019-09-11 DIAGNOSIS — Z12.11 ENCOUNTER FOR SCREENING COLONOSCOPY: Primary | ICD-10-CM

## 2019-09-11 NOTE — TELEPHONE ENCOUNTER
Please let the patient know her cologuard came back positive. This means that she should have a colonoscopy done to follow this up. Please find out from the patient is she has a gastro doctor in the area that she can call to make an appointment.  thanks

## 2019-09-12 NOTE — TELEPHONE ENCOUNTER
Gave patient telephone number to Dr. Paresh Quintana # [phone]. Will mail referral order to home address.

## 2019-09-13 ENCOUNTER — TELEPHONE (OUTPATIENT)
Dept: INTERNAL MEDICINE CLINIC | Age: 74
End: 2019-09-13

## 2019-09-13 NOTE — TELEPHONE ENCOUNTER
Please give MsMoises Felipe Barragan a call, she needs   information on a psychiatrist, and results of a colorguard test.  She will be seeing Dr. Devin Elizabeth. Please call.

## 2019-09-13 NOTE — TELEPHONE ENCOUNTER
Writer contacted patient to inform of cologuard results and information for Dr. Josephine Galo per Davion Dee. Patient already has appointment with Dr. Josephine Galo on September 18, she would like for us to fax to Dr. Sara Gann office the cologuard results and a copy to her in the mail. Patient also would like the name of a psychiatrist, patient has a lot going on and would like this information. Patient has appointment on Sept 20th to discuss help with getting assisted living. Writer informed patient Davion Dee cannot help her with this in an appointment, but maybe we can get our Nurse Navigators involved to help her get in the right direction. Will contact patient when we have further information for patient, patient verbalized understanding.

## 2019-09-19 ENCOUNTER — HOSPITAL ENCOUNTER (OUTPATIENT)
Dept: ULTRASOUND IMAGING | Age: 74
Discharge: HOME | End: 2019-09-19
Attending: SPECIALIST
Payer: MEDICARE

## 2019-09-19 DIAGNOSIS — E04.0 NONTOXIC (DIFFUSE) GOITER: ICD-10-CM

## 2019-09-19 PROCEDURE — 76536 US EXAM OF HEAD AND NECK: CPT

## 2019-09-20 ENCOUNTER — OFFICE VISIT (OUTPATIENT)
Dept: INTERNAL MEDICINE CLINIC | Age: 74
End: 2019-09-20

## 2019-09-20 ENCOUNTER — HOSPITAL ENCOUNTER (OUTPATIENT)
Dept: LAB | Age: 74
Discharge: HOME | End: 2019-09-20
Payer: MEDICARE

## 2019-09-20 ENCOUNTER — TELEPHONE (OUTPATIENT)
Dept: INTERNAL MEDICINE CLINIC | Age: 74
End: 2019-09-20

## 2019-09-20 VITALS
DIASTOLIC BLOOD PRESSURE: 51 MMHG | SYSTOLIC BLOOD PRESSURE: 114 MMHG | HEIGHT: 64 IN | OXYGEN SATURATION: 98 % | TEMPERATURE: 98.1 F | HEART RATE: 72 BPM | BODY MASS INDEX: 21.34 KG/M2 | RESPIRATION RATE: 20 BRPM | WEIGHT: 125 LBS

## 2019-09-20 DIAGNOSIS — F43.9 STRESS AT HOME: ICD-10-CM

## 2019-09-20 DIAGNOSIS — L84 CALLUS OF FOOT: Primary | ICD-10-CM

## 2019-09-20 DIAGNOSIS — R22.1 NECK MASS: ICD-10-CM

## 2019-09-20 DIAGNOSIS — E87.0 SERUM SODIUM ELEVATED: ICD-10-CM

## 2019-09-20 DIAGNOSIS — R19.5 POSITIVE COLORECTAL CANCER SCREENING USING COLOGUARD TEST: ICD-10-CM

## 2019-09-20 PROCEDURE — 80053 COMPREHEN METABOLIC PANEL: CPT

## 2019-09-20 PROCEDURE — 36415 COLL VENOUS BLD VENIPUNCTURE: CPT

## 2019-09-20 NOTE — TELEPHONE ENCOUNTER
Writer contacted patient to inform of handicap sticker paperwork ready for . Patient also informed writer Dr. Donaldo Cardenas contacted patient and he is going to do surgery and remove the thyroid, wanted to let Tanna know.

## 2019-09-20 NOTE — TELEPHONE ENCOUNTER
Patient got her flu sot yesterday at the [facility].  thanks
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
## 2019-09-21 LAB
ALBUMIN SERPL-MCNC: 4.3 G/DL (ref 3.5–4.8)
ALBUMIN/GLOB SERPL: 2 {RATIO} (ref 1.2–2.2)
ALP SERPL-CCNC: 59 IU/L (ref 39–117)
ALT SERPL-CCNC: 12 IU/L (ref 0–32)
AST SERPL-CCNC: 13 IU/L (ref 0–40)
BILIRUB SERPL-MCNC: 0.7 MG/DL (ref 0–1.2)
BUN SERPL-MCNC: 21 MG/DL (ref 8–27)
BUN/CREAT SERPL: 44 (ref 12–28)
CALCIUM SERPL-MCNC: 9.2 MG/DL (ref 8.7–10.3)
CHLORIDE SERPL-SCNC: 102 MMOL/L (ref 96–106)
CO2 SERPL-SCNC: 23 MMOL/L (ref 20–29)
CREAT SERPL-MCNC: 0.48 MG/DL (ref 0.57–1)
GLOBULIN SER CALC-MCNC: 2.1 G/DL (ref 1.5–4.5)
GLUCOSE SERPL-MCNC: 90 MG/DL (ref 65–99)
POTASSIUM SERPL-SCNC: 4 MMOL/L (ref 3.5–5.2)
PROT SERPL-MCNC: 6.4 G/DL (ref 6–8.5)
SODIUM SERPL-SCNC: 142 MMOL/L (ref 134–144)

## 2019-10-29 ENCOUNTER — HOSPITAL ENCOUNTER (OUTPATIENT)
Age: 74
Setting detail: OUTPATIENT SURGERY
Discharge: HOME | End: 2019-10-29
Attending: INTERNAL MEDICINE | Admitting: INTERNAL MEDICINE
Payer: MEDICARE

## 2019-10-29 ENCOUNTER — ANESTHESIA (OUTPATIENT)
Dept: ENDOSCOPY | Age: 74
End: 2019-10-29
Payer: MEDICARE

## 2019-10-29 ENCOUNTER — ANESTHESIA EVENT (OUTPATIENT)
Dept: ENDOSCOPY | Age: 74
End: 2019-10-29
Payer: MEDICARE

## 2019-10-29 VITALS
WEIGHT: 125 LBS | BODY MASS INDEX: 21.34 KG/M2 | SYSTOLIC BLOOD PRESSURE: 124 MMHG | HEART RATE: 96 BPM | OXYGEN SATURATION: 100 % | HEIGHT: 64 IN | DIASTOLIC BLOOD PRESSURE: 46 MMHG | RESPIRATION RATE: 21 BRPM | TEMPERATURE: 97.8 F

## 2019-10-29 PROCEDURE — 76040000007: Performed by: INTERNAL MEDICINE

## 2019-10-29 PROCEDURE — 74011250636 HC RX REV CODE- 250/636: Performed by: INTERNAL MEDICINE

## 2019-10-29 PROCEDURE — 76060000032 HC ANESTHESIA 0.5 TO 1 HR: Performed by: INTERNAL MEDICINE

## 2019-10-29 PROCEDURE — 74011250636 HC RX REV CODE- 250/636: Performed by: NURSE ANESTHETIST, CERTIFIED REGISTERED

## 2019-10-29 RX ORDER — PROPOFOL 10 MG/ML
INJECTION, EMULSION INTRAVENOUS
Status: DISCONTINUED | OUTPATIENT
Start: 2019-10-29 | End: 2019-10-29 | Stop reason: HOSPADM

## 2019-10-29 RX ORDER — FLUMAZENIL 0.1 MG/ML
0.2 INJECTION INTRAVENOUS
Status: DISCONTINUED | OUTPATIENT
Start: 2019-10-29 | End: 2019-10-29 | Stop reason: HOSPADM

## 2019-10-29 RX ORDER — DEXTROMETHORPHAN/PSEUDOEPHED 2.5-7.5/.8
1.2 DROPS ORAL
Status: DISCONTINUED | OUTPATIENT
Start: 2019-10-29 | End: 2019-10-29 | Stop reason: HOSPADM

## 2019-10-29 RX ORDER — MIDAZOLAM HYDROCHLORIDE 1 MG/ML
.25-5 INJECTION, SOLUTION INTRAMUSCULAR; INTRAVENOUS
Status: DISCONTINUED | OUTPATIENT
Start: 2019-10-29 | End: 2019-10-29 | Stop reason: HOSPADM

## 2019-10-29 RX ORDER — NALOXONE HYDROCHLORIDE 0.4 MG/ML
0.4 INJECTION, SOLUTION INTRAMUSCULAR; INTRAVENOUS; SUBCUTANEOUS
Status: DISCONTINUED | OUTPATIENT
Start: 2019-10-29 | End: 2019-10-29 | Stop reason: HOSPADM

## 2019-10-29 RX ORDER — ATROPINE SULFATE 0.1 MG/ML
0.4 INJECTION INTRAVENOUS
Status: DISCONTINUED | OUTPATIENT
Start: 2019-10-29 | End: 2019-10-29 | Stop reason: HOSPADM

## 2019-10-29 RX ORDER — SODIUM CHLORIDE 9 MG/ML
50 INJECTION, SOLUTION INTRAVENOUS CONTINUOUS
Status: DISCONTINUED | OUTPATIENT
Start: 2019-10-29 | End: 2019-10-29 | Stop reason: HOSPADM

## 2019-10-29 RX ORDER — EPINEPHRINE 0.1 MG/ML
1 INJECTION INTRACARDIAC; INTRAVENOUS
Status: DISCONTINUED | OUTPATIENT
Start: 2019-10-29 | End: 2019-10-29 | Stop reason: HOSPADM

## 2019-10-29 RX ORDER — SODIUM CHLORIDE 9 MG/ML
INJECTION, SOLUTION INTRAVENOUS
Status: DISCONTINUED | OUTPATIENT
Start: 2019-10-29 | End: 2019-10-29 | Stop reason: HOSPADM

## 2019-10-29 RX ORDER — PROPOFOL 10 MG/ML
INJECTION, EMULSION INTRAVENOUS AS NEEDED
Status: DISCONTINUED | OUTPATIENT
Start: 2019-10-29 | End: 2019-10-29 | Stop reason: HOSPADM

## 2019-10-29 RX ADMIN — SODIUM CHLORIDE 50 ML/HR: 900 INJECTION, SOLUTION INTRAVENOUS at 13:56

## 2019-10-29 RX ADMIN — PROPOFOL 120 MCG/KG/MIN: 10 INJECTION, EMULSION INTRAVENOUS at 14:26

## 2019-10-29 RX ADMIN — PROPOFOL 80 MG: 10 INJECTION, EMULSION INTRAVENOUS at 14:36

## 2019-10-29 RX ADMIN — PROPOFOL 120 MG: 10 INJECTION, EMULSION INTRAVENOUS at 14:26

## 2019-10-29 RX ADMIN — SODIUM CHLORIDE: 9 INJECTION, SOLUTION INTRAVENOUS at 14:53

## 2019-10-29 NOTE — PERIOP NOTES
1427  Timeout performed. Anesthesia staff at patient's bedside administering anesthesia and monitoring patients vital signs throughout procedure. See anesthesia note. 5  Endoscope was pre-cleaned at bedside immediately following procedure by [employer]. 1455  Patient tolerated procedure without problems. Abdomen soft and patient arousable and voices no complaints Report received from CRNA, see anesthesia note. Patient transported to endoscopy recovery area. Report given to ADRIAN MORENO RN.

## 2019-10-29 NOTE — H&P
The patient is a 76year old female who presents with a complaint of Abnormal lab tests. The patient presents consultation at the request of  (Ms. Arnold Carlin). Note for \"Abnormal lab tests\": Was recently found to have a positive cologuard that she did about a week ago. She denies having a colonoscopy in the past. She denies any family history of colon cancer or colon polyps. She has a goiter that she is in the process of getting an ultrasound and biopsy of. She denies any GI complaints. She is on depakote for history of seizures and is on vitamin B12 for low levels. She uses a wheel chair because of inability to walk independently. She had back surgery for spinal stenosis in the past.      Past Surgical History (Elvin Faith; 9/18/2019 9:49 AM)  Back Surgery    Neck Surgery    Hysterectomy; Total    Cholecystectomy      Allergies (Elvin Faith; 9/18/2019 9:49 AM)  Acid Reducer *ULCER DRUGS/ANTISPASMODICS/ANTICHOLINERGICS*      Medication History (Elvin Faith; 9/18/2019 9:50 AM)  Depakote  (500MG Tablet DR, Oral two times daily) Active. Vitamin D3  (1000UNIT Capsule, Oral daily) Active. Tylenol 8 Hour  (650MG Tablet ER, Oral as needed) Active. Vitamin B12  (1000MCG Tablet ER, Oral daily) Active. Medications Reconciled     Family History (Elvin Faith; 9/18/2019 9:49 AM)  Kidney Disease   First Degree Relatives. Social History (Elvin Faith; 9/18/2019 9:49 AM)  Blood Transfusion   No.  Alcohol Use   Has never drank. Employment status   Retired. Marital status   . Tobacco Use   Never smoker. Health Maintenance History (Elvin Faith; 9/18/2019 9:49 AM)  Pneumovax   unknown  Flu Vaccine  [10/2018]:        Review of Systems (Elvin Faith; 9/18/2019 9:49 AM)  General Not Present- Chronic Fatigue, Poor Appetite, Weight Gain and Weight Loss. Skin Not Present- Itching, Rash and Skin Color Changes. HEENT Not Present- Hearing Loss and Vertigo.   Respiratory Not Present- Difficulty Breathing and TB exposure. Cardiovascular Not Present- Chest Pain, Use of Antibiotics before Dental Procedures and Use of Blood Thinners. Gastrointestinal Present- See HPI. Musculoskeletal Not Present- Arthritis, Hip Replacement Surgery and Knee Replacement Surgery. Neurological Not Present- Weakness. Psychiatric Not Present- Depression. Endocrine Not Present- Diabetes and Thyroid Problems. Hematology Not Present- Anemia. Vitals (Suad Adame; 9/18/2019 9:55 AM)  9/18/2019 9:50 AM  Weight: 125 lb   Height: 64 in   Body Surface Area: 1.6 m²   Body Mass Index: 21.46 kg/m²    BP: 110/70 (Sitting, Left Arm, Standard)              Physical Exam (Leo Batista MD; 9/18/2019 10:20 AM)  General  Mental Status - Alert. General Appearance - Cooperative, Pleasant, Not in acute distress. Orientation - Oriented X3. Build & Nutrition - Well nourished and Well developed. Integumentary  General Characteristics  Overall examination of the patient's skin reveals - no rashes, no bruises and no spider angiomas. Color - normal coloration of skin. Head and Neck  Neck  Global Assessment - full range of motion and supple, no bruit auscultated on the right, no bruit auscultated on the left, non-tender, no lymphadenopathy. Thyroid  Gland Characteristics - normal size and consistency. Note:  enlarged goiter left side    Eye  Eyeball - Left - No Exophthalmos. Eyeball - Right - No Exophthalmos. Sclera/Conjunctiva - Left - No Jaundice. Sclera/Conjunctiva - Right - No Jaundice. Chest and Lung Exam  Chest and lung exam reveals  - quiet, even and easy respiratory effort with no use of accessory muscles. Auscultation  Breath sounds - Normal. Adventitious sounds - No Adventitious sounds. Cardiovascular  Auscultation  Rhythm - Regular, No Tachycardia, No Bradycardia . Heart Sounds - Normal heart sounds , S1 WNL and S2 WNL, No S3, No Summation Gallop.  Murmurs & Other Heart Sounds - Auscultation of the heart reveals - No Murmurs. Abdomen  Inspection  Inspection of the abdomen reveals - Non-distended. Palpation/Percussion  Tenderness - Non-Tender. Rebound tenderness - No rebound. Liver - No hepatosplenomegaly. Abdominal Mass Palpable - No masses. Other Characteristics - No Ascites. Organomegally - None. Auscultation  Auscultation of the abdomen reveals - Bowel sounds normal, No Abdominal bruits and No Succussion splash. Rectal - Did not examine. Peripheral Vascular  Upper Extremity  Inspection - Left - Normal - No Clubbing, No Cyanosis, No Edema, Pulses Intact. Right - Normal - No Clubbing, No Cyanosis, No Edema, Pulses Intact. Palpation - Edema - Left - No edema. Right - No edema. Lower Extremity  Inspection - Left - Inspection Normal. Right - Inspection Normal. Palpation - Edema - Left - No edema. Right - No edema. Neurologic  Neurologic evaluation reveals  - Cranial nerves grossly intact, no focal neurologic deficits. Motor  Involuntary Movements - Asterixis - not present. Musculoskeletal  Global Assessment  Gait and Station - normal gait and station. Assessment & Plan (Leo Grant MD; 9/18/2019 10:22 AM)  Positive colorectal cancer screening using Cologuard test (787.7  R19.5)  Impression: Without GI symptoms,ll proceed with colonoscopy at [facility]. Current Plans  Colonoscopy (58807) (Discussed risks and benefits with the patient to include:; perforation, post polypectomy, or post biopsy bleeding, missed lesions, and sedation reactions.)  Started Suprep Bowel Prep Kit 17.5-3.13-1.6GM/177ML, 180 Milliliter as directed, 180 Milliliter, 1 day starting 09/18/2019, No Refill. Pt Education - How to access health information online: discussed with patient and provided information. Patient is to call me for any questions or concerns.

## 2019-10-29 NOTE — PROGRESS NOTES
Jayne [facility]  1945  [id number]    Situation:  Verbal report received from:   Hudson Pichardo RN   Procedure: Procedure(s):  COLONOSCOPY    Background:    Preoperative diagnosis: POSITIVE COLOGUARD  Postoperative diagnosis: Diverticulosis, hemorrhoids. :  Dr. Carrie Torres   Assistant(s): Endoscopy Technician-1: Sylvain Dong  Endoscopy RN-1: Radha Reynolds RN    Specimens: * No specimens in log *  H. Pylori  no    Assessment:  Intra-procedure medications       Anesthesia gave intra-procedure sedation and medications, see anesthesia flow sheet yes    Intravenous fluids: NS@ KVO     Vital signs stable   yes    Abdominal assessment: round and soft   yes    Recommendation:  Discharge patient per MD order  yes.   Return to floor  outpatient  Family or Friend  Son   Permission to share finding with family or friend no

## 2019-10-29 NOTE — DISCHARGE INSTRUCTIONS
[facility]. Tyree Hui M.D.  [phone]            COLON DISCHARGE INSTRUCTIONS       10/29/2019    Melvin Lesch  :  1945  [facility] Medical Record Number:  [id number]      COLONOSCOPY FINDINGS:  Your colonoscopy showed: diverticulosis and internal hemorrhoids, otherwise mucosa within normal.    DISCOMFORT:  Redness at IV site- apply warm compress to area; if redness or soreness persist- contact your physician  There may be a slight amount of blood passed from the rectum  Gaseous discomfort- walking, belching will help relieve any discomfort  You may not operate a vehicle for 12 hours  You may not engage in an occupation involving machinery or appliances for rest of today  You may not drink alcoholic beverages for at least 12 hours  Avoid making any critical decisions for at least 24 hour  DIET:   High fiber diet. - however -  remember your colon is empty and a heavy meal will produce gas. Avoid these foods:  vegetables, fried / greasy foods, carbonated drinks for today     ACTIVITY:  You may resume your normal daily activities it is recommended that you spend the remainder of the day resting -  avoid any strenuous activity. CALL M.D. ANY SIGN OF:   Increasing pain, nausea, vomiting  Abdominal distension (swelling)  New increased bleeding (oral or rectal)  Fever (chills)  Pain in chest area  Bloody discharge from nose or mouth   Shortness of breath    Follow-up Instructions:   Call Dr. Steven Rosenthal if any questions or problems. Telephone # [phone]  No need for repeat colonoscopy based on age.

## 2019-10-29 NOTE — ANESTHESIA PREPROCEDURE EVALUATION
Relevant Problems   No relevant active problems       Anesthetic History   No history of anesthetic complications            Review of Systems / Medical History  Patient summary reviewed, nursing notes reviewed and pertinent labs reviewed    Pulmonary  Within defined limits                 Neuro/Psych     seizures (Onset 5/2019, started on Depekote with good control)    Psychiatric history (bipolar)     Cardiovascular    Hypertension                Comments: Prolonged qt   GI/Hepatic/Renal  Within defined limits              Endo/Other  Within defined limits        Pertinent negatives: Hypothyroidism: thyroid nodule.    Other Findings   Comments: Degenerative disc disease: Lumbar fusion 2018, ACDF 5/2019, continues with cervical myelopathy, leg weakness         Physical Exam    Airway  Mallampati: III  TM Distance: 4 - 6 cm  Neck ROM: normal range of motion   Mouth opening: Normal     Cardiovascular    Rhythm: regular  Rate: normal         Dental    Dentition: Lower dentition intact and Upper dentition intact     Pulmonary  Breath sounds clear to auscultation               Abdominal  GI exam deferred       Other Findings            Anesthetic Plan    ASA: 3  Anesthesia type: MAC          Induction: Intravenous  Anesthetic plan and risks discussed with: Patient

## 2019-10-29 NOTE — PROCEDURES
Selwyn Mcrae M.D.  [phone]            10/29/2019          Colonoscopy Operative Report  Marian Boone  :  1945  [facility] Medical Record Number:  [id number]      Indications:  Positive cologuard     :  Brenton Tucker MD    Referring Provider: Aaliyah Bravo PA-C    Sedation:  MAC anesthesia    Pre-Procedural Exam:      Airway: clear,  No airway problems anticipated  Heart: RRR, without gallops or rubs  Lungs: clear bilaterally without wheezes, crackles, or rhonchi  Abdomen: soft, nontender, nondistended, bowel sounds present  Mental Status: awake, alert and oriented to person, place and time     Procedure Details:  After informed consent was obtained with all risks and benefits of procedure explained and preoperative exam completed, the patient was taken to the endoscopy suite and placed in the left lateral decubitus position. Upon sequential sedation as per above, a digital rectal exam was performed. The Olympus videocolonoscope  was inserted in the rectum and carefully advanced to the cecum, which was identified by the ileocecal valve and appendiceal orifice. The quality of preparation was satisfactory. The colonoscope was slowly withdrawn with careful inspection and evaluation between folds. Retroflexion in the rectum was performed. Findings:   Terminal Ileum: not intubated  Cecum: normal  Ascending Colon: normal  Transverse Colon: normal  Descending Colon: normal  Sigmoid: no mucosal lesion appreciated  moderate diverticulosis; Rectum: no mucosal lesion appreciated  Grade 2 internal hemorrhoid(s); Interventions:  none    Specimen Removed:  none    Complications: None. EBL:  None. Impression:  Moderate diverticulosis and lumen tortuosity noted in the sigmoid colon, slim pediatric scope was used to pass. Recommendations:  -High fiber diet.    -Resume normal medication(s).    -Colon cancer screening test over the age of 76 becomes individualized based on health and willingness to undergo testing. Discharge Disposition:  Home in the company of a  when able to ambulate.     Nereyda Hinkle MD  10/29/2019  3:03 PM

## 2019-11-05 NOTE — ANESTHESIA POSTPROCEDURE EVALUATION
Procedure(s):  COLONOSCOPY.     MAC    Anesthesia Post Evaluation        Patient location during evaluation: PACU  Level of consciousness: awake  Pain management: adequate  Airway patency: patent  Anesthetic complications: no  Cardiovascular status: acceptable  Respiratory status: acceptable  Hydration status: acceptable  Post anesthesia nausea and vomiting:  none      Vitals Value Taken Time   /46 10/29/2019  3:17 PM   Temp     Pulse 96 10/29/2019  3:17 PM   Resp 21 10/29/2019  3:17 PM   SpO2 100 % 10/29/2019  3:17 PM

## 2020-01-10 ENCOUNTER — OFFICE VISIT (OUTPATIENT)
Dept: INTERNAL MEDICINE CLINIC | Age: 75
End: 2020-01-10

## 2020-01-10 ENCOUNTER — HOSPITAL ENCOUNTER (OUTPATIENT)
Dept: LAB | Age: 75
Discharge: HOME | End: 2020-01-10

## 2020-01-10 VITALS
SYSTOLIC BLOOD PRESSURE: 139 MMHG | OXYGEN SATURATION: 96 % | BODY MASS INDEX: 21.34 KG/M2 | RESPIRATION RATE: 20 BRPM | HEIGHT: 64 IN | HEART RATE: 96 BPM | TEMPERATURE: 97.9 F | DIASTOLIC BLOOD PRESSURE: 70 MMHG | WEIGHT: 125 LBS

## 2020-01-10 DIAGNOSIS — R29.898 WEAKNESS OF BOTH LEGS: Primary | ICD-10-CM

## 2020-01-10 DIAGNOSIS — R39.15 URINARY URGENCY: ICD-10-CM

## 2020-01-10 DIAGNOSIS — R56.9 NEW ONSET SEIZURE (HCC): ICD-10-CM

## 2020-01-10 DIAGNOSIS — E87.0 SERUM SODIUM ELEVATED: ICD-10-CM

## 2020-01-10 LAB
ALBUMIN SERPL-MCNC: 3.9 G/DL (ref 3.5–5)
ALBUMIN/GLOB SERPL: 1.1 {RATIO} (ref 1.1–2.2)
ALP SERPL-CCNC: 54 U/L (ref 45–117)
ALT SERPL-CCNC: 17 U/L (ref 12–78)
ANION GAP SERPL CALC-SCNC: 7 MMOL/L (ref 5–15)
AST SERPL-CCNC: 8 U/L (ref 15–37)
BILIRUB SERPL-MCNC: 0.5 MG/DL (ref 0.2–1)
BUN SERPL-MCNC: 15 MG/DL (ref 6–20)
BUN/CREAT SERPL: 28 (ref 12–20)
CALCIUM SERPL-MCNC: 9.5 MG/DL (ref 8.5–10.1)
CHLORIDE SERPL-SCNC: 106 MMOL/L (ref 97–108)
CO2 SERPL-SCNC: 29 MMOL/L (ref 21–32)
CREAT SERPL-MCNC: 0.53 MG/DL (ref 0.55–1.02)
GLOBULIN SER CALC-MCNC: 3.4 G/DL (ref 2–4)
GLUCOSE SERPL-MCNC: 117 MG/DL (ref 65–100)
POTASSIUM SERPL-SCNC: 4.2 MMOL/L (ref 3.5–5.1)
PROT SERPL-MCNC: 7.3 G/DL (ref 6.4–8.2)
SODIUM SERPL-SCNC: 142 MMOL/L (ref 136–145)

## 2020-01-10 RX ORDER — ONABOTULINUMTOXINA 200 [USP'U]/1
INJECTION, POWDER, LYOPHILIZED, FOR SOLUTION INTRADERMAL; INTRAMUSCULAR
COMMUNITY
Start: 2019-12-11 | End: 2020-08-24

## 2020-01-10 RX ORDER — DIVALPROEX SODIUM 500 MG/1
500 TABLET, DELAYED RELEASE ORAL 2 TIMES DAILY
Qty: 60 TAB | Refills: 1 | Status: SHIPPED | OUTPATIENT
Start: 2020-01-10 | End: 2020-01-10 | Stop reason: CLARIF

## 2020-01-10 RX ORDER — OXYCODONE AND ACETAMINOPHEN 7.5; 325 MG/1; MG/1
TABLET ORAL
COMMUNITY
Start: 2020-01-03 | End: 2021-08-10 | Stop reason: ALTCHOICE

## 2020-01-10 RX ORDER — TIZANIDINE HYDROCHLORIDE 4 MG/1
CAPSULE, GELATIN COATED ORAL
COMMUNITY
Start: 2019-10-30 | End: 2020-08-24

## 2020-01-10 RX ORDER — HYDROCODONE BITARTRATE AND ACETAMINOPHEN 5; 325 MG/1; MG/1
TABLET ORAL
Refills: 0 | COMMUNITY
Start: 2019-10-03 | End: 2020-01-10 | Stop reason: ALTCHOICE

## 2020-01-10 RX ORDER — CYCLOBENZAPRINE HCL 5 MG
TABLET ORAL
COMMUNITY
Start: 2019-12-10 | End: 2020-08-24

## 2020-01-10 RX ORDER — HYDROCODONE BITARTRATE AND ACETAMINOPHEN 7.5; 325 MG/1; MG/1
TABLET ORAL
COMMUNITY
Start: 2019-11-19 | End: 2020-01-10 | Stop reason: ALTCHOICE

## 2020-01-10 RX ORDER — DIVALPROEX SODIUM 500 MG/1
500 TABLET, DELAYED RELEASE ORAL 2 TIMES DAILY
Qty: 60 TAB | Refills: 1 | Status: SHIPPED | OUTPATIENT
Start: 2020-01-10 | End: 2020-04-05

## 2020-01-10 NOTE — PROGRESS NOTES
Patient needs help at home, needs to move out. Patient fell off bed this morning on the floor for 3 hours. Wants to discuss pain management, botox injections.

## 2020-01-10 NOTE — PROGRESS NOTES
Chief Complaint   Patient presents with    Follow-up     she is a 76y.o. year old female who presents for evaluation. Patient presents the office today for a number of concerns. She reports since the last visit she saw Dr. Nora Pérez about the mass of her neck. She reports that she was told that it was benign and that he wanted to schedule her for surgery to have it removed. The patient reports she is not scheduled this surgery as of yet because she has been working on other things with her health. She reports Dr. Karen Villaseñor is her pain doctor and he has been treating her with Botox injections. She reports that she has not noticed much difference in the Botox injections but was told that she should give it a chance to see if it helps. She will be scheduled for another Botox injection next week. The patient reports she is still interested in moving out of her son's house and into a home of her own. Patient also would like to know if she can have occupational or physical therapy done at her home. Patient reports since having her surgery on her spinal stenosis her legs has been weakened and she even fell as recent as today. The patient reports she has had some urinary urgency. She reports that she was at the grocery store recently and did not make it to the bathroom in time. She would like to know if she should see a urologist in reference to this. Patient denies burning with urination. Reviewed PmHx, RxHx, FmHx, SocHx, AllgHx and updated and dated in the chart.     Review of Systems - negative except as listed above    Objective:     Vitals:    01/10/20 1529   BP: 139/70   Pulse: 96   Resp: 20   Temp: 97.9 °F (36.6 °C)   TempSrc: Oral   SpO2: 96%   Weight: 125 lb (56.7 kg)   Height: 5' 4\" (1.626 m)     Physical Examination: General appearance - alert, well appearing, and in no distress and resting in wheel chair  Mental status - normal mood, behavior, speech, dress, motor activity, and thought processes    Assessment/ Plan:   Diagnoses and all orders for this visit:    1. Weakness of both legs    2. New onset seizure (HCC)  -     divalproex DR (DEPAKOTE) 500 mg tablet; Take 1 Tab by mouth two (2) times a day. Anti-seizure medication    3. Serum sodium elevated  -     METABOLIC PANEL, COMPREHENSIVE; Future    4. Urinary urgency  -     REFERRAL TO UROLOGY      I have refilled the patient Depakote until she is able to see her neurologist.  I had [facility] RN to come meet the patient in reference to some of her concerns. [facility] was able to give the patient some information about a company that helps to determine what facility she may be able to afford and would best fit her needs. I have placed an order to make sure the patient's sodium level is normal.  A referral for occupational and physical therapy will be placed. I believe the patient would benefit from the services at her home in hopes they would help to strengthen her legs to help prevent falls. A referral has been placed for the patient to see the urologist in reference to her urinary symptoms. The patient was not able to give a urine here in the office today but she has been sent home with equipment and she is to bring back a urine sample at her earliest convenience. Total encounter time was 25 minutes; over 50% of the time was spent counseling and coordinating care regarding weakness of legs, urinary frequency, getting the patient information about finding new housing. I have discussed the diagnosis with the patient and the intended plan as seen in the above orders. The patient has received an after-visit summary and questions were answered concerning future plans.      Medication Side Effects and Warnings were discussed with patient: n/a  Patient Labs were reviewed and or requested: yes  Patient Past Records were reviewed and or requested  yes    Tanna Thomas PA-C

## 2020-01-13 ENCOUNTER — TELEPHONE (OUTPATIENT)
Dept: INTERNAL MEDICINE CLINIC | Age: 75
End: 2020-01-13

## 2020-01-13 ENCOUNTER — TELEPHONE (OUTPATIENT)
Dept: NEUROLOGY | Age: 75
End: 2020-01-13

## 2020-01-13 DIAGNOSIS — N30.01 ACUTE CYSTITIS WITH HEMATURIA: Primary | ICD-10-CM

## 2020-01-13 LAB
BILIRUB UR QL STRIP: NEGATIVE
GLUCOSE UR-MCNC: NEGATIVE MG/DL
KETONES P FAST UR STRIP-MCNC: NEGATIVE MG/DL
PH UR STRIP: 6.5 [PH] (ref 4.6–8)
PROT UR QL STRIP: NORMAL
SP GR UR STRIP: 1.02 (ref 1–1.03)
UA UROBILINOGEN AMB POC: NORMAL (ref 0.2–1)
URINALYSIS CLARITY POC: NORMAL
URINALYSIS COLOR POC: YELLOW
URINE BLOOD POC: NORMAL
URINE LEUKOCYTES POC: NORMAL
URINE NITRITES POC: NEGATIVE

## 2020-01-13 PROCEDURE — 87186 SC STD MICRODIL/AGAR DIL: CPT

## 2020-01-13 PROCEDURE — 87077 CULTURE AEROBIC IDENTIFY: CPT

## 2020-01-13 PROCEDURE — 87086 URINE CULTURE/COLONY COUNT: CPT

## 2020-01-13 RX ORDER — SULFAMETHOXAZOLE AND TRIMETHOPRIM 800; 160 MG/1; MG/1
1 TABLET ORAL 2 TIMES DAILY
Qty: 10 TAB | Refills: 0 | Status: SHIPPED | OUTPATIENT
Start: 2020-01-13 | End: 2020-08-24

## 2020-01-13 NOTE — PROGRESS NOTES
Writer contacted patient to inform of lab results per Viviana Colmenares, patient verbalized understanding. Patient also has an appointment with Dr. Ke Jean for surgery in Feb and Dr. Flower Wilcox for bladder.

## 2020-01-13 NOTE — TELEPHONE ENCOUNTER
----- Message from Tonie Parham sent at 1/10/2020  3:13 PM EST -----  Regarding: RE: Dr. Abigail Rodriguez: [phone]  My apologies, I'm not sure how I forgot to attach the patient. She should be attached now - [facility] [employer]  1945. Thanks!  ----- Message -----  From: Cyrus Taveras  Sent: 1/10/2020   3:05 PM EST  To: Tonie Parham  Subject: RE: Dr. Akin Cerda                         This message isn't attached to a patient's chart. .. Please correct and resend.  ----- Message -----  From: Lesli Sorto  Sent: 1/10/2020   2:56 PM EST  To: [facility] Front Office Pool  Subject: Dr. Lauren Pickard first and last name: [facility]   Reason for call: Status of medication  Callback required yes/no and why: Yes  Best contact number(s): [phone]  Details to clarify the request: Patient would like to know the status of her medication refill  to [address] [place] [address] ([phone]) for divalproex extended release 500mg. Patient stated that she has been waiting for a few day and has not received a call back.

## 2020-01-13 NOTE — TELEPHONE ENCOUNTER
PCP filled medication with 1 refill. Dr. Mitzi Badillo previously denied medication because patient no showed last visit.  Message left on patient's voicemail

## 2020-01-14 ENCOUNTER — HOSPITAL ENCOUNTER (OUTPATIENT)
Dept: LAB | Age: 75
Discharge: HOME | End: 2020-01-14

## 2020-01-14 DIAGNOSIS — R39.15 URINARY URGENCY: ICD-10-CM

## 2020-01-14 NOTE — TELEPHONE ENCOUNTER
Writer called patient to inform of antibiotic sent for UTI, patient at an appointment and stated she would call back.

## 2020-01-16 LAB
BACTERIA SPEC CULT: ABNORMAL
CC UR VC: ABNORMAL
SERVICE CMNT-IMP: ABNORMAL

## 2020-01-16 NOTE — PROGRESS NOTES
Writer contacted patient to inform of UC results and instruction per Colin Reddy, patient verbalized understanding.

## 2020-01-16 NOTE — PROGRESS NOTES
Please let patient know urine did come back positive for bacteria. The medication the patient was prescribed should cover the infection. She should follow-up if symptoms does not improve or worsen.

## 2020-04-12 DIAGNOSIS — G95.9 CERVICAL MYELOPATHY (HCC): ICD-10-CM

## 2020-04-13 RX ORDER — PREGABALIN 50 MG/1
CAPSULE ORAL
Qty: 60 CAP | OUTPATIENT
Start: 2020-04-13

## 2020-04-13 NOTE — TELEPHONE ENCOUNTER
Lyrica (50 mg BID) Rx denied  [facility] June 2019  No-showed visits in  and   Reviewed PCPs last note, pt seeing Dr Almond Siemens for pain management    Will need to move up her currently scheduled f/u visit 6- to [facility] for any refill requests

## 2020-04-15 NOTE — TELEPHONE ENCOUNTER
Called patient and scheduled for [facility] Thursday, April 16, 2020 01:00 PM  With Dr. Gilmore Levo

## 2020-04-16 ENCOUNTER — TELEPHONE (OUTPATIENT)
Dept: NEUROLOGY | Age: 75
End: 2020-04-16

## 2020-04-16 NOTE — TELEPHONE ENCOUNTER
----- Message from Atul Jack sent at 4/16/2020  1:38 PM EDT -----  Regarding: Dr. Adilene Reid telephone  Contact: [phone]    Caller's first and last name: n/a  Reason for call: virtual visit  Callback required yes/no and why: yes  Best contact number(s): [phone]  Details to clarify the request:  Advised her appt was at 1:00pm and she still has not been called. Her phone is acting up so she is currently using her daughter's phone.

## 2020-06-18 ENCOUNTER — OFFICE VISIT (OUTPATIENT)
Dept: NEUROLOGY | Age: 75
End: 2020-06-18

## 2020-06-18 VITALS
SYSTOLIC BLOOD PRESSURE: 110 MMHG | DIASTOLIC BLOOD PRESSURE: 68 MMHG | WEIGHT: 125 LBS | HEIGHT: 64 IN | BODY MASS INDEX: 21.34 KG/M2 | TEMPERATURE: 97.3 F | HEART RATE: 77 BPM | OXYGEN SATURATION: 99 %

## 2020-06-18 DIAGNOSIS — G40.909 SEIZURE DISORDER (HCC): ICD-10-CM

## 2020-06-18 DIAGNOSIS — R29.898 WEAKNESS OF BOTH LEGS: ICD-10-CM

## 2020-06-18 DIAGNOSIS — G40.909 SEIZURE DISORDER (HCC): Primary | ICD-10-CM

## 2020-06-18 DIAGNOSIS — M62.838 MUSCLE SPASTICITY: ICD-10-CM

## 2020-06-18 DIAGNOSIS — G95.9 CERVICAL MYELOPATHY (HCC): ICD-10-CM

## 2020-06-18 RX ORDER — DIVALPROEX SODIUM 500 MG/1
500 TABLET, DELAYED RELEASE ORAL 2 TIMES DAILY
Qty: 180 TAB | Refills: 1 | Status: ON HOLD | OUTPATIENT
Start: 2020-06-18 | End: 2020-09-03

## 2020-06-18 NOTE — PATIENT INSTRUCTIONS
Renewed your Depakote 500 mg twice daily to reduce the chance of a future seizure Discussed that I do not think Lyrica will help any tightness/ muscular spasms. Lyrica only helps with burning, shooting, stabbing, tingling, pins-needles sensations. The tightness in your legs (spasticity) is from spinal cord compression you had in April 2019. It caused a bruise in your spinal cord (which caused the leg problems/ spasticity). I ordered MRI of your Cervical and Thoracic Spine with and without contrast to evaluate for new areas of spinal stenosis, and spasticity in your legs. If there's any new or severe spinal stenosis cervical or thoracic spinal stenosis, you will need to follow up with Dr Edward Alvarez to discuss whether any further surgeries are needed. Follow up with me in 6 months regarding the seizure issue.

## 2020-06-18 NOTE — PROGRESS NOTES
Needs refill of lyrica. Seeing pain management. Need to schedule xrays of the lumbar spine ordered by Dr. Sharda Wilson.

## 2020-06-18 NOTE — PROGRESS NOTES
Neurology Progress Note    Patient ID:   Deanna Carr  [id number]  76 y.o.  1945    Date of Office Visit: 06/18/20    Chief Complaint   Patient presents with    Follow-up       Interval Hx:     Last visit: 6-20-19    Pt following up to request new prescription for Depakote (500 mg twice a day) to prevent any seizures. Her seizure history is interesting in that she never had seizures in childhood or adulthood until the day after she had undergone her MRI cervical and thoracic spine (4/25/2019) to look for the cause of her bilateral lower extremity spasticity. Her daughter reported that the patient had not started the baclofen I had given her at her appointment on 4/19/2019 before she had the seizures today the patient says it was a cluster of 2 or 3 seizures that took her to [facility] While there she underwent cervical spine surgery for her spinal cord compression and was put on Depakote as an antiseizure medication. She denies any further seizures after being put on Depakote. She is now seeing pain management for her chronic back pain. She has had both cervical and lumbar spine surgery. She continues to have severe spasticity in both legs. She reports her pain management doctor is checking some x-rays of her lower back is considering sending her back to a spine surgeon. In regards to Lyrica, I gave patient prescription of Lyrica 50 mg twice a day at her last visit in June 2019. She had run out of it after her last refill. When asked if she is having numbness tingling shooting burning stabbing in any extremity she says no. She says she has muscular spasms in her legs and sometimes in her arms. I discussed with her that the symptoms are related to her cervical myelopathy i.e. bruising of her cervical spinal cord), and I don't think the Lyrica will help this issue. Brief ROS: as noted above or otherwise negative      Objective:      PMHx:  has a past medical history of Acquired spondylolisthesis, Anxiety, Arrhythmia, Arthritis, Bipolar 1 disorder ([facility] [address] Chronic pain, Degeneration of lumbar intervertebral disc, Diabetes ([facility] [address] Diverticulosis of large intestine (2018), Heart murmur, Hypertension, Prolonged Q-T interval on ECG (12/10/2018), Pseudoclaudication, and Thyroid disease. PSH:   has a past surgical history that includes hx total vaginal hysterectomy (1986); hx lumbar laminectomy (12/14/2018); hx lumbar fusion (12/14/2018); hx open cholecystectomy (1986); hx hysterectomy (1986); and colonoscopy (N/A, 10/29/2019). Allergies:   No Known Allergies    Meds:     Current Outpatient Medications   Medication Sig    divalproex DR (DEPAKOTE) 500 mg tablet Take 1 Tab by mouth two (2) times a day for 90 days. Anti-seizure medication    cyclobenzaprine (FLEXERIL) 5 mg tablet TK 1 T PO BID PRN    oxyCODONE-acetaminophen (PERCOCET 7.5) 7.5-325 mg per tablet     cholecalciferol (VITAMIN D3) 1,000 unit cap TAKE 1 CAPSULE BY MOUTH EVERY DAY    cyanocobalamin 1,000 mcg tablet TAKE 1 TABLET BY MOUTH EVERY DAY    trimethoprim-sulfamethoxazole (BACTRIM DS, SEPTRA DS) 160-800 mg per tablet Take 1 Tab by mouth two (2) times a day.  BOTOX 200 unit injection TO BE INJECTED BY A PHYSICIAN IN RIGHT KNEE AND LEFT KNEE FOR CONTRACTIONS    tiZANidine (ZANAFLEX) 4 mg capsule TK 1 C PO QD HS    VOLTAREN 1 % gel APPLY ONE APPLICATION TOPICALLY QID    pregabalin (LYRICA) 50 mg capsule Take 1 Cap by mouth two (2) times a day. Max Daily Amount: 100 mg. Indications: Nerve Pain from Spinal Cord Injury    baclofen (LIORESAL) 10 mg tablet Take one to two tablets, three times a day. For severe muscle cramping and spasms in legs.     OTHER Bedside commode       PHYSICAL EXAM    Vitals:    06/18/20 1413   BP: 110/68   BP 1 Location: Left arm   BP Patient Position: Sitting   Pulse: 77   Temp: 97.3 °F (36.3 °C)   SpO2: 99%   Weight: 56.7 kg (125 lb)   Height: 5' 4\" (1.626 m)     In general awake alert resting in wheelchair. Hearing and speech are normal.  She is wearing a facemask so cannot assess for face symmetry. She has severe spasticity in both legs and minimal spasticity in the right arm a little more than the left arm. Normal sensation both arms with mild reduced sensation in both legs. Nonambulatory due to leg spasticity. Impression/ Plan:       ICD-10-CM ICD-9-CM    1. Seizure disorder ([address] G40.909 345.90 divalproex DR (DEPAKOTE) 500 mg tablet      EEG      MRI [facility] SPINE W WO CONT      VALPROIC ACID      CANCELED: VALPROIC ACID   2. Cervical myelopathy (HCC) G95.9 721.1 MRI CERV SPINE W WO CONT   3. Weakness of both legs R29.898 729.89 MRI CERV SPINE W WO CONT      MRI [facility] SPINE W WO CONT   4. Muscle spasticity M62.838 728.85 MRI CERV SPINE W WO CONT      MRI [facility] SPINE W WO CONT       Given the following typewritten instructions      Renewed your Depakote 500 mg twice daily to reduce the chance of a future seizure    Discussed that I do not think Lyrica will help any tightness/ muscular spasms. Lyrica only helps with burning, shooting, stabbing, tingling, pins-needles sensations. The tightness in your legs (spasticity) is from spinal cord compression you had in April 2019. It caused a bruise in your spinal cord (which caused the leg problems/ spasticity). I ordered MRI of your Cervical and Thoracic Spine with and without contrast to evaluate for new areas of spinal stenosis, and spasticity in your legs. If there's any new or severe spinal stenosis cervical or thoracic spinal stenosis, you will need to follow up with Dr Vivek Varela to discuss whether any further surgeries are needed. Follow up with me in 6 months regarding the seizure issue.          Signed By: Bertha Casillas MD     June 18, 2020

## 2020-06-19 LAB — VALPROATE SERPL-MCNC: 43 UG/ML (ref 50–100)

## 2020-07-01 NOTE — PROGRESS NOTES
Depakote level is 43, a little low (normal range is ). Advise patient to increase her Depakote 500 mg tablets to 1 in AM and 2 in PM.      I recently sent her 90 day Rx for one tab twice daily.   When she gets close to running out of current Rx, she can call back and ask me to send new Rx with correct # of tablets (#270 to last 90 days)    Sent to PCP/ Erum MÉNDEZ as well to keep in the loop

## 2020-07-10 ENCOUNTER — TELEPHONE (OUTPATIENT)
Dept: NEUROLOGY | Age: 75
End: 2020-07-10

## 2020-07-10 NOTE — TELEPHONE ENCOUNTER
----- Message from Alex Muhammad MD sent at 7/1/2020  5:34 PM EDT -----  Depakote level is 43, a little low (normal range is ). Advise patient to increase her Depakote 500 mg tablets to 1 in AM and 2 in PM.      I recently sent her 90 day Rx for one tab twice daily.   When she gets close to running out of current Rx, she can call back and ask me to send new Rx with correct # of tablets (#270 to last 90 days)    Sent to PCP/ Farhana MÉNDEZ as well to keep in the loop

## 2020-07-10 NOTE — TELEPHONE ENCOUNTER
Called patient and informed her of depakote level. She is not sure if she wants to increase the depakote because they are hard to swallow.  She would like to know the results of the EEG test.

## 2020-07-15 ENCOUNTER — HOSPITAL ENCOUNTER (OUTPATIENT)
Dept: MRI IMAGING | Age: 75
Discharge: HOME | End: 2020-07-15
Attending: PSYCHIATRY & NEUROLOGY
Payer: MEDICARE

## 2020-07-15 ENCOUNTER — DOCUMENTATION ONLY (OUTPATIENT)
Dept: NEUROLOGY | Age: 75
End: 2020-07-15

## 2020-07-15 VITALS — WEIGHT: 125 LBS | BODY MASS INDEX: 21.46 KG/M2

## 2020-07-15 DIAGNOSIS — R29.898 WEAKNESS OF BOTH LEGS: ICD-10-CM

## 2020-07-15 DIAGNOSIS — M62.838 MUSCLE SPASTICITY: ICD-10-CM

## 2020-07-15 DIAGNOSIS — G40.909 SEIZURE DISORDER (HCC): ICD-10-CM

## 2020-07-15 DIAGNOSIS — G95.9 CERVICAL MYELOPATHY (HCC): ICD-10-CM

## 2020-07-15 PROCEDURE — A9575 INJ GADOTERATE MEGLUMI 0.1ML: HCPCS | Performed by: RADIOLOGY

## 2020-07-15 PROCEDURE — 74011250636 HC RX REV CODE- 250/636: Performed by: RADIOLOGY

## 2020-07-15 PROCEDURE — 72156 MRI NECK SPINE W/O & W/DYE: CPT

## 2020-07-15 PROCEDURE — 72157 MRI CHEST SPINE W/O & W/DYE: CPT

## 2020-07-15 RX ORDER — GADOTERATE MEGLUMINE 376.9 MG/ML
10 INJECTION INTRAVENOUS
Status: COMPLETED | OUTPATIENT
Start: 2020-07-15 | End: 2020-07-15

## 2020-07-15 RX ADMIN — GADOTERATE MEGLUMINE 10 ML: 376.9 INJECTION INTRAVENOUS at 16:32

## 2020-07-15 NOTE — TELEPHONE ENCOUNTER
If having trouble swallowing the Depakote, I can change the Depakote to Sprinkles so she can open the capsules and put in her food or can try solution form of Depakote. Let me know.

## 2020-07-15 NOTE — PROCEDURES
Name:   Anurag Alves  YOB: 1945  MRN:   [id number]           Procedure:   EEG     Location:   [address]  Date of Recordin2020    Date of Interpretation:    07/15/20    Interpreting physician: Thelma Gauthier MD  Requesting provider:   As above      Indication: 76 y.o. female with history of seizure disorder, starting in adulthood. No prior EEGs on record. Current medications: has a current medication list which includes the following prescription(s): divalproex dr, trimethoprim-sulfamethoxazole, cyclobenzaprine, botox, oxycodone-acetaminophen, tizanidine, voltaren, pregabalin, cholecalciferol, cyanocobalamin, baclofen, and OTHER. Technical:   Digital EEG recorded in 10-20 international placement system, multiple montages    Interpretation:   Patient level of alertness: awake, resting  Background pattern: symmetric. Posterior dominant rhythm: 9-10 Hz, symmetric. Photic stimulation: symmetric. Hyperventilation: not performed. Drowsiness recorded: yes, normal.  Sleep recorded: no.  Single lead EKG: no abnormalities. Areas of focal slowing: none. Epileptiform discharges: no sharp or spike-wave discharges. Abnormal:   At 10:18 into the recording, patient is resting. There is development of generalized low voltage fast-beta frequencies, slightly more on left, that evolves into a generalized medium amplitude theta pattern, starts to fade out after 30 seconds but still shows left more then right hemispheric theta pattern before ending. Video reviewed: patient did not appear to be moving. Impression: This is an abnormal awake and drowsy EEG recording. There were no definite epileptiform discharges during this recording but during rest, the above patter of generalized fast-beta activity followed by medium amplitude generalized theta pattern (with a left sided emphasis) was seen.  There was no movement associated with this and it does not appear to be typical transition into drowsiness. This pattern is suspicious for subclinical electrographic seizure.      Clinical correlation necessary    Consider repeating EEG wit Sleep Deprived EEG and /or 24 hour Ambulatory EEG recording       Asaf Jamison MD  [facility]

## 2020-07-15 NOTE — TELEPHONE ENCOUNTER
EEG showed some seizure abnormalities. Recommend she increase the medication as suggested previously and have a 24 hour Ambulatory EEG study in 4 weeks. Entered order for the latter.

## 2020-07-16 NOTE — TELEPHONE ENCOUNTER
Returned call to patient. She would like to try the Depakote sprinkles. She wanted to let provider know that she completed the MRIs yesterday.      She will schedule 24 hour EEG when she is called by patient care team

## 2020-08-11 DIAGNOSIS — R94.01 ABNORMAL EEG: ICD-10-CM

## 2020-08-11 DIAGNOSIS — G40.909 SEIZURE DISORDER (HCC): Primary | ICD-10-CM

## 2020-08-11 NOTE — TELEPHONE ENCOUNTER
Entered order for 24 hour ambulatory EEG to be done / set up at [place] [address]. I really believed I had entered this order in the past but there's no record of it.

## 2020-08-18 DIAGNOSIS — G40.909 SEIZURE DISORDER (HCC): ICD-10-CM

## 2020-08-18 DIAGNOSIS — R94.01 ABNORMAL EEG: ICD-10-CM

## 2020-08-24 ENCOUNTER — HOSPITAL ENCOUNTER (OUTPATIENT)
Dept: PREADMISSION TESTING | Age: 75
Discharge: HOME | End: 2020-08-24
Payer: MEDICARE

## 2020-08-24 VITALS
WEIGHT: 132 LBS | RESPIRATION RATE: 18 BRPM | HEIGHT: 64 IN | DIASTOLIC BLOOD PRESSURE: 68 MMHG | BODY MASS INDEX: 22.53 KG/M2 | HEART RATE: 66 BPM | OXYGEN SATURATION: 99 % | SYSTOLIC BLOOD PRESSURE: 154 MMHG | TEMPERATURE: 98.5 F

## 2020-08-24 LAB
ABO + RH BLD: NORMAL
ALBUMIN SERPL-MCNC: 3.5 G/DL (ref 3.5–5)
ALBUMIN/GLOB SERPL: 1.1 {RATIO} (ref 1.1–2.2)
ALP SERPL-CCNC: 46 U/L (ref 45–117)
ALT SERPL-CCNC: 15 U/L (ref 12–78)
ANION GAP SERPL CALC-SCNC: 5 MMOL/L (ref 5–15)
AST SERPL-CCNC: 10 U/L (ref 15–37)
BILIRUB SERPL-MCNC: 0.6 MG/DL (ref 0.2–1)
BLOOD GROUP ANTIBODIES SERPL: NORMAL
BUN SERPL-MCNC: 16 MG/DL (ref 6–20)
BUN/CREAT SERPL: 32 (ref 12–20)
CALCIUM SERPL-MCNC: 8.5 MG/DL (ref 8.5–10.1)
CHLORIDE SERPL-SCNC: 108 MMOL/L (ref 97–108)
CO2 SERPL-SCNC: 29 MMOL/L (ref 21–32)
CREAT SERPL-MCNC: 0.5 MG/DL (ref 0.55–1.02)
GLOBULIN SER CALC-MCNC: 3.3 G/DL (ref 2–4)
GLUCOSE SERPL-MCNC: 87 MG/DL (ref 65–100)
POTASSIUM SERPL-SCNC: 4.2 MMOL/L (ref 3.5–5.1)
PROT SERPL-MCNC: 6.8 G/DL (ref 6.4–8.2)
SODIUM SERPL-SCNC: 142 MMOL/L (ref 136–145)
SPECIMEN EXP DATE BLD: NORMAL

## 2020-08-24 PROCEDURE — 80053 COMPREHEN METABOLIC PANEL: CPT

## 2020-08-24 PROCEDURE — 93005 ELECTROCARDIOGRAM TRACING: CPT

## 2020-08-24 PROCEDURE — 86900 BLOOD TYPING SEROLOGIC ABO: CPT

## 2020-08-24 PROCEDURE — 36415 COLL VENOUS BLD VENIPUNCTURE: CPT

## 2020-08-24 NOTE — PERIOP NOTES
[address] [address] [facility]   MAIN OR                                  [phone]   MAIN PRE OP                          [phone]                                                                                AMBULATORY PRE OP          [phone]  PRE-ADMISSION TESTING    [phone]   Surgery Date:       Is surgery arrival time given by surgeon? YES  NO  If NO, [facility] staff will call you between 3 and 7pm the day before your surgery with your arrival time. (If your surgery is on a Monday, we will call you the Friday before.)    Call [phone] after 7pm Monday-Friday if you did not receive this call. INSTRUCTIONS BEFORE YOUR SURGERY   When You  Arrive Arrive at the [address] [facility] on the day of your surgery  Have your insurance card, photo ID, and any copayment (if needed)   Food   and   Drink NO food or drink after midnight the night before surgery    This means NO water, gum, mints, coffee, juice, etc.  No alcohol (beer, wine, liquor) 24 hours before and after surgery   Medications to   TAKE   Morning of Surgery  MEDICATIONS TO TAKE THE MORNING OF SURGERY WITH A SIP OF   Water    *    Depakote   Medications  To  STOP      7 days before surgery  Non-Steroidal anti-inflammatory Drugs (NSAID's): for example, Ibuprofen (Advil, Motrin), Naproxen (Aleve)   Aspirin, if taking for pain    Herbal supplements, vitamins, and fish oil   Other:  (Pain medications not listed above, including Tylenol may be taken)   Blood  Thinners  If you take  Aspirin, Plavix, Coumadin, or any blood-thinning or anti-blood clot medicine, talk to the doctor who prescribed the medications for pre-operative instructions.    Bathing Clothing  Jewelry  Valuables      If you shower the morning of surgery, please do not apply anything to your skin (lotions, powders, deodorant, or makeup, especially mascara)   Follow Chlorhexidine [employer] body wash instructions provided to you during PAT appointment. Begin 3 days prior to surgery.  Do not shave or trim anywhere 24 hours before surgery   Wear your hair loose or down; no pony-tails, buns, or metal hair clips   Wear loose, comfortable, clean clothes   Wear glasses instead of contacts   Leave money, valuables, and jewelry, including body piercings, at home   Going Home - or Spending the Night  SAME-DAY SURGERY: You must have a responsible adult drive you home and stay with you 24 hours after surgery   ADMITS: If your doctor is keeping you in the hospital after surgery, leave personal belongings/luggage in your car until you have a hospital room number. Hospital discharge time is 12 noon  Drivers must be here before 12 noon unless you are told differently   Special Instructions Chlor-hex  wash reviewed, incentive spirometry/deep breathing, leg exercises to prevent DVT, s/s of infection & what to report to MD all reviewed with patient    Pt verified understanding by teach back and return demonstration  It is now mandated that all surgical patients be tested for COVID-19 prior to surgery. Testing has to be exactly 4 days prior to surgery. Your COVID test date is Sunday 8/30 between 8:00 am and 11:00 am.       COVID testing will be performed curbside at the [facility]. There will be signs leading you to the testing site. You will need to bring a photo ID with you to be swabbed. Patients are advised to self-quarantine at home after testing and prior to your surgery date. You will be notified if your results are positive.     What to watch for:   Coronavirus (COVID-19) affects different people in different ways   It also appears with a wide range of symptoms from mild to severe   Signs usually appear 2-14 days after exposure     If you develop any of the following, notify your doctor immediately:  o Fever  o Chills, with or without a shiver  o Muscle pain  o Headache  o Sore throat  o Dry cough  o New loss of taste or smell  o Tiredness      If you develop any of the following, call [phone]:  o Shortness of breath  o Difficulty breathing  o Chest pain  o New confusion  o Blueness of fingers and/or lips     Follow all instructions so your surgery wont be cancelled. Please, be on time. If a situation occurs and you are delayed the day of surgery, call  [phone]. If your physical condition changes (like a fever, cold, flu, etc.) call your surgeon. Home medication(s) reviewed and verified via    VERBAL   during PAT appointment. The patient was contacted by     IN-PERSON  The patient verbalizes understanding of all instructions and      DOES NOT   need reinforcement.

## 2020-08-24 NOTE — H&P
Preoperative Evaluation                     History and Physical with Surgical Risk Stratification     8/24/2020    CC: Goiter  Surgery: Left Lobectomy     HPI:   Rodney Maher is a 76 y.o. female referred for pre-operative evaluation by Dr. Faustin Strafford for surgery on 9/3/20. Ms. Belén Spears states she has had a goiter for several years but has noticed it got bigger recently. She has mild dysphagia and mild pain. The biggest problem she is having is she needs cervical surgery and they cannot proceed until she has the goiter removed. She is followed by Dr. Miner Nicely with neurology for her new onset seizure that occurred in 2019. The patient was evaluated in the surgeon's office and it was determined that the most appropriate plan of care is to proceed with surgical intervention. Patient's PCP Treva Thomas PA-C    Review of Systems     Constitutional: Negative for chills and fever  HENT: Negative for congestion and sore throat  Eyes: negative for blurred vision and double vision  Respiratory: Negative for cough, shortness of breath and wheezing  Mouth: Negative for loose, broken or chipped teeth. Cardiovascular: Negative for chest pain and palpitations  Gastrointestinal: Negative for abdominal pain, constipation, diarrhea and nausea  Genitourinary: Negative for dysuria and hematuria  Musculoskeletal: Limited mobility, Back pain  Skin: Negative for rash, open wounds. Negative for bruises easily  Neurological: Negative for dizziness, tremors and headaches  Psychiatric: Negative for depression. The patient is not nervous/anxious.     Inherent Risk of Surgery     Surgical risk:  Intermediate  Low:  EIntermediate:   head and neckHigh:    Patient Cardiac Risk Assessment     Revised Cardiac Risk Index (RCRI)    Rate if cardiac death, nonfatal MI, nonfatal cardiac arrest by number of risk factor- 0.4%    UMESH/AHA 2007 Guidelines:   1) Surgery Emergency, Non-cardiac -> to surgery  2) If not, look at clinical predictors    Major Intermediate Minor       Blood Thinner: NA    METS      EQUAL TO 4 Care for self Walk indoors around house  Light work around house (dust, dishes)     Other Risk Factors:   Screening for ETOH use:  Done and low risk  Smoking status:  Never     Personal or FH of bleeding problems:  No  Personal or FH of blood clots:  No  Personal or FH of anesthesia problems:   No    Pulmonary Risk:  Asthma or COPD:  No  Body mass index is 22.66 kg/m². Known SUZANNA:  No  Albumin normal, BUN normal    Past Medical, Surgical, Social History     Allergies: No Known Allergies    Medication Documentation Review Audit     Reviewed by Ari Peralta RN (Registered Nurse) on 08/24/20 at 1426    Medication Sig Documenting Provider Last Dose Status Taking?   divalproex DR (DEPAKOTE) 500 mg tablet Take 1 Tab by mouth two (2) times a day for 90 days. Anti-seizure medication   Patient taking differently:  Take 500 mg by mouth Before breakfast, lunch, dinner and at bedtime. Anti-seizure medication    Roxy Pritchett MD  Active    oxyCODONE-acetaminophen (PERCOCET 7.5) 7.5-325 mg per tablet every six (6) hours as needed.  Provider, Historical  Active                 Past Medical History:   Diagnosis Date    Acquired spondylolisthesis     Anxiety     Arthritis     Chronic pain     Degeneration of lumbar intervertebral disc     Depression     Diverticulosis of large intestine 2018    sigmoid & descending    Goiter     Heart murmur     Hypertension 2018    currently no medications    Prolonged Q-T interval on ECG 12/10/2018    Pseudoclaudication     Seizure disorder ([facility] [address] 04/25/2019     Past Surgical History:   Procedure Laterality Date    COLONOSCOPY N/A 10/29/2019    COLONOSCOPY performed by Eligio Patel MD at [address] [place] [address] N/A 05/2019    HX HYSTERECTOMY  1986    HX LUMBAR FUSION  12/14/2018    L4-5    HX OPEN CHOLECYSTECTOMY  1986    3 months after childbirth     Social History Tobacco Use    Smoking status: Never Smoker    Smokeless tobacco: Never Used   Substance Use Topics    Alcohol use: No    Drug use: Yes     Types: Prescription, Opiates     Family History   Problem Relation Age of Onset    Cancer Mother         bone cancer    Heart Failure Father     Cancer Sister         GI    Anesth Problems Neg Hx     Deep Vein Thrombosis Neg Hx        Objective     Vitals:    08/24/20 1411   BP: 154/68   Pulse: 66   Resp: 18   Temp: 98.5 °F (36.9 °C)   SpO2: 99%   Weight: 59.9 kg (132 lb)   Height: 5' 4\" (1.626 m)       Constitutional:  Appears well,  No Acute Distress, Vitals noted  Psychiatric:   Affect normal, Alert and Oriented to person/place/time    Eyes:   Pupils equally round and reactive, EOMI, conjunctiva clear, eyelids normal  ENT:   External ears and nose normal, teeth normal, gums normal, TMs and Orophyarynx normal  Neck:   Large left sided goiter    Lungs:   Clear to auscultation, good respiratory effort  Heart: Ausculation normal.  Regular rhythm. No cardiac murmurs.   No carotid bruits or palpable thrills  Chest wall normal  Musculoskeletal: Limited mobility  Extremities:   Without edema, good peripheral pulses  Skin:   Warm to palpation, without rashes, bruising, or suspicious lesions     Recent Results (from the past 72 hour(s))   TYPE & SCREEN    Collection Time: 08/24/20  3:14 PM   Result Value Ref Range    Crossmatch Expiration 09/06/2020     ABO/Rh(D) A NEGATIVE     Antibody screen NEG    METABOLIC PANEL, COMPREHENSIVE    Collection Time: 08/24/20  3:14 PM   Result Value Ref Range    Sodium 142 136 - 145 mmol/L    Potassium 4.2 3.5 - 5.1 mmol/L    Chloride 108 97 - 108 mmol/L    CO2 29 21 - 32 mmol/L    Anion gap 5 5 - 15 mmol/L    Glucose 87 65 - 100 mg/dL    BUN 16 6 - 20 MG/DL    Creatinine 0.50 (L) 0.55 - 1.02 MG/DL    BUN/Creatinine ratio 32 (H) 12 - 20      GFR est AA >60 >60 ml/min/1.73m2    GFR est non-AA >60 >60 ml/min/1.73m2    Calcium 8.5 8.5 - 10.1 MG/DL    Bilirubin, total 0.6 0.2 - 1.0 MG/DL    ALT (SGPT) 15 12 - 78 U/L    AST (SGOT) 10 (L) 15 - 37 U/L    Alk. phosphatase 46 45 - 117 U/L    Protein, total 6.8 6.4 - 8.2 g/dL    Albumin 3.5 3.5 - 5.0 g/dL    Globulin 3.3 2.0 - 4.0 g/dL    A-G Ratio 1.1 1.1 - 2.2     EKG, 12 LEAD, INITIAL    Collection Time: 08/24/20  3:27 PM   Result Value Ref Range    Ventricular Rate 64 BPM    Atrial Rate 64 BPM    P-R Interval 136 ms    QRS Duration 88 ms    Q-T Interval 410 ms    QTC Calculation (Bezet) 422 ms    Calculated P Axis -2 degrees    Calculated R Axis 18 degrees    Calculated T Axis 33 degrees    Diagnosis       Normal sinus rhythm  Normal ECG  When compared with ECG of 26-JUL-2018 00:19,  Nonspecific T wave abnormality no longer evident in Inferior leads  T wave inversion no longer evident in Lateral leads  QT has shortened  Confirmed by Monica Or MD, [id number] ([id number]) on 8/25/2020 7:31:09 AM         Assessment and Plan     Assessment/Plan:   1) Goiter  2) Pre-Operative Evaluation    Labs and EKG reviewed    Preoperative Clearance  Per RCRI, the patient has a 0.4% risk of cardiac death, nonfatal MI, nonfatal cardiac arrest based on no risk factors. Per ACC/AHA guidelines, patient is low risk for a(n) intermediate risk surgery and may proceed to planned surgery with the above noted risk.     Branden Lee NP

## 2020-08-25 LAB
ATRIAL RATE: 64 BPM
CALCULATED P AXIS, ECG09: -2 DEGREES
CALCULATED R AXIS, ECG10: 18 DEGREES
CALCULATED T AXIS, ECG11: 33 DEGREES
DIAGNOSIS, 93000: NORMAL
P-R INTERVAL, ECG05: 136 MS
Q-T INTERVAL, ECG07: 410 MS
QRS DURATION, ECG06: 88 MS
QTC CALCULATION (BEZET), ECG08: 422 MS
VENTRICULAR RATE, ECG03: 64 BPM

## 2020-08-30 ENCOUNTER — HOSPITAL ENCOUNTER (OUTPATIENT)
Dept: PREADMISSION TESTING | Age: 75
Discharge: HOME | End: 2020-08-30
Payer: MEDICARE

## 2020-08-30 PROCEDURE — 87635 SARS-COV-2 COVID-19 AMP PRB: CPT

## 2020-08-31 LAB — SARS-COV-2, COV2NT: NOT DETECTED

## 2020-09-02 ENCOUNTER — ANESTHESIA EVENT (OUTPATIENT)
Dept: SURGERY | Age: 75
End: 2020-09-02
Payer: MEDICARE

## 2020-09-03 ENCOUNTER — ANESTHESIA (OUTPATIENT)
Dept: SURGERY | Age: 75
End: 2020-09-03
Payer: MEDICARE

## 2020-09-03 ENCOUNTER — HOSPITAL ENCOUNTER (OUTPATIENT)
Age: 75
Discharge: HOME | End: 2020-09-04
Attending: SPECIALIST | Admitting: SPECIALIST
Payer: MEDICARE

## 2020-09-03 DIAGNOSIS — E04.9 SUBSTERNAL THYROID GOITER: Primary | ICD-10-CM

## 2020-09-03 PROCEDURE — 77030040506 HC DRN WND MDII -A: Performed by: SPECIALIST

## 2020-09-03 PROCEDURE — 74011250636 HC RX REV CODE- 250/636: Performed by: STUDENT IN AN ORGANIZED HEALTH CARE EDUCATION/TRAINING PROGRAM

## 2020-09-03 PROCEDURE — 77030040356 HC CORD BPLR FRCP COVD -A: Performed by: SPECIALIST

## 2020-09-03 PROCEDURE — 77030018390 HC SPNG HEMSTAT2 J&J -B: Performed by: SPECIALIST

## 2020-09-03 PROCEDURE — 77030040830 HC CATH URETH FOL MDII -A: Performed by: SPECIALIST

## 2020-09-03 PROCEDURE — 74011000250 HC RX REV CODE- 250: Performed by: NURSE ANESTHETIST, CERTIFIED REGISTERED

## 2020-09-03 PROCEDURE — 77030008463 HC STPLR SKN PROX J&J -B: Performed by: SPECIALIST

## 2020-09-03 PROCEDURE — 99218 HC RM OBSERVATION: CPT

## 2020-09-03 PROCEDURE — 77030010516 HC APPL HEMA CLP TELE -B: Performed by: SPECIALIST

## 2020-09-03 PROCEDURE — 76210000037 HC AMBSU PH I REC 2 TO 2.5 HR: Performed by: SPECIALIST

## 2020-09-03 PROCEDURE — 77030002996 HC SUT SLK J&J -A: Performed by: SPECIALIST

## 2020-09-03 PROCEDURE — 74011250636 HC RX REV CODE- 250/636: Performed by: NURSE ANESTHETIST, CERTIFIED REGISTERED

## 2020-09-03 PROCEDURE — 77030040922 HC BLNKT HYPOTHRM STRY -A

## 2020-09-03 PROCEDURE — 77030013629 HC ELECTRD NDL STRY -B: Performed by: SPECIALIST

## 2020-09-03 PROCEDURE — 77030018836 HC SOL IRR NACL ICUM -A: Performed by: SPECIALIST

## 2020-09-03 PROCEDURE — 77030012411 HC DRN WND CARD -A: Performed by: SPECIALIST

## 2020-09-03 PROCEDURE — 77030002933 HC SUT MCRYL J&J -A: Performed by: SPECIALIST

## 2020-09-03 PROCEDURE — 77030031753 HC SHR ENDO COAG HARM J&J -E: Performed by: SPECIALIST

## 2020-09-03 PROCEDURE — 74011000250 HC RX REV CODE- 250: Performed by: SPECIALIST

## 2020-09-03 PROCEDURE — 74011000258 HC RX REV CODE- 258: Performed by: SPECIALIST

## 2020-09-03 PROCEDURE — 77030010507 HC ADH SKN DERMBND J&J -B: Performed by: SPECIALIST

## 2020-09-03 PROCEDURE — 77030002888 HC SUT CHRMC J&J -A: Performed by: SPECIALIST

## 2020-09-03 PROCEDURE — 88307 TISSUE EXAM BY PATHOLOGIST: CPT

## 2020-09-03 PROCEDURE — 74011250637 HC RX REV CODE- 250/637: Performed by: SPECIALIST

## 2020-09-03 PROCEDURE — 77030040361 HC SLV COMPR DVT MDII -B

## 2020-09-03 PROCEDURE — 77030008684 HC TU ET CUF COVD -B: Performed by: NURSE ANESTHETIST, CERTIFIED REGISTERED

## 2020-09-03 PROCEDURE — 77030026438 HC STYL ET INTUB CARD -A: Performed by: NURSE ANESTHETIST, CERTIFIED REGISTERED

## 2020-09-03 PROCEDURE — 76060000066 HC AMB SURG ANES 3 TO 3.5 HR: Performed by: SPECIALIST

## 2020-09-03 PROCEDURE — 77030019655 HC PRB STIM CRAN MEDT -B: Performed by: SPECIALIST

## 2020-09-03 PROCEDURE — 76030000006 HC AMB SURG OR TIME 3 TO 3.5: Performed by: SPECIALIST

## 2020-09-03 DEVICE — ABSORBABLE HEMOSTAT (OXIDIZED REGENERATED CELLULOSE, U.S.P.)
Type: IMPLANTABLE DEVICE | Site: NECK | Status: FUNCTIONAL
Brand: SURGICEL

## 2020-09-03 RX ORDER — ACETAMINOPHEN 500 MG
500 TABLET ORAL
Status: DISCONTINUED | OUTPATIENT
Start: 2020-09-03 | End: 2020-09-04 | Stop reason: HOSPADM

## 2020-09-03 RX ORDER — DIVALPROEX SODIUM 250 MG/1
500 TABLET, DELAYED RELEASE ORAL
Status: DISCONTINUED | OUTPATIENT
Start: 2020-09-03 | End: 2020-09-04 | Stop reason: HOSPADM

## 2020-09-03 RX ORDER — OXYCODONE AND ACETAMINOPHEN 7.5; 325 MG/1; MG/1
1 TABLET ORAL
Status: DISCONTINUED | OUTPATIENT
Start: 2020-09-03 | End: 2020-09-04 | Stop reason: HOSPADM

## 2020-09-03 RX ORDER — SODIUM CHLORIDE, SODIUM LACTATE, POTASSIUM CHLORIDE, CALCIUM CHLORIDE 600; 310; 30; 20 MG/100ML; MG/100ML; MG/100ML; MG/100ML
125 INJECTION, SOLUTION INTRAVENOUS CONTINUOUS
Status: DISCONTINUED | OUTPATIENT
Start: 2020-09-03 | End: 2020-09-03 | Stop reason: HOSPADM

## 2020-09-03 RX ORDER — PROPOFOL 10 MG/ML
INJECTION, EMULSION INTRAVENOUS AS NEEDED
Status: DISCONTINUED | OUTPATIENT
Start: 2020-09-03 | End: 2020-09-03 | Stop reason: HOSPADM

## 2020-09-03 RX ORDER — LIDOCAINE HYDROCHLORIDE 10 MG/ML
0.1 INJECTION, SOLUTION EPIDURAL; INFILTRATION; INTRACAUDAL; PERINEURAL AS NEEDED
Status: DISCONTINUED | OUTPATIENT
Start: 2020-09-03 | End: 2020-09-03 | Stop reason: HOSPADM

## 2020-09-03 RX ORDER — ROCURONIUM BROMIDE 10 MG/ML
INJECTION, SOLUTION INTRAVENOUS AS NEEDED
Status: DISCONTINUED | OUTPATIENT
Start: 2020-09-03 | End: 2020-09-03 | Stop reason: HOSPADM

## 2020-09-03 RX ORDER — BACITRACIN ZINC 500 UNIT/G
OINTMENT (GRAM) TOPICAL AS NEEDED
Status: DISCONTINUED | OUTPATIENT
Start: 2020-09-03 | End: 2020-09-03 | Stop reason: HOSPADM

## 2020-09-03 RX ORDER — FENTANYL CITRATE 50 UG/ML
INJECTION, SOLUTION INTRAMUSCULAR; INTRAVENOUS AS NEEDED
Status: DISCONTINUED | OUTPATIENT
Start: 2020-09-03 | End: 2020-09-03 | Stop reason: HOSPADM

## 2020-09-03 RX ORDER — MIDAZOLAM HYDROCHLORIDE 1 MG/ML
INJECTION, SOLUTION INTRAMUSCULAR; INTRAVENOUS AS NEEDED
Status: DISCONTINUED | OUTPATIENT
Start: 2020-09-03 | End: 2020-09-03 | Stop reason: HOSPADM

## 2020-09-03 RX ORDER — ONDANSETRON 2 MG/ML
4 INJECTION INTRAMUSCULAR; INTRAVENOUS AS NEEDED
Status: DISCONTINUED | OUTPATIENT
Start: 2020-09-03 | End: 2020-09-03 | Stop reason: HOSPADM

## 2020-09-03 RX ORDER — HYDROMORPHONE HYDROCHLORIDE 2 MG/ML
INJECTION, SOLUTION INTRAMUSCULAR; INTRAVENOUS; SUBCUTANEOUS AS NEEDED
Status: DISCONTINUED | OUTPATIENT
Start: 2020-09-03 | End: 2020-09-03 | Stop reason: HOSPADM

## 2020-09-03 RX ORDER — DEXAMETHASONE SODIUM PHOSPHATE 4 MG/ML
INJECTION, SOLUTION INTRA-ARTICULAR; INTRALESIONAL; INTRAMUSCULAR; INTRAVENOUS; SOFT TISSUE AS NEEDED
Status: DISCONTINUED | OUTPATIENT
Start: 2020-09-03 | End: 2020-09-03 | Stop reason: HOSPADM

## 2020-09-03 RX ORDER — LIDOCAINE HYDROCHLORIDE 20 MG/ML
INJECTION, SOLUTION EPIDURAL; INFILTRATION; INTRACAUDAL; PERINEURAL AS NEEDED
Status: DISCONTINUED | OUTPATIENT
Start: 2020-09-03 | End: 2020-09-03 | Stop reason: HOSPADM

## 2020-09-03 RX ORDER — ONDANSETRON 2 MG/ML
INJECTION INTRAMUSCULAR; INTRAVENOUS AS NEEDED
Status: DISCONTINUED | OUTPATIENT
Start: 2020-09-03 | End: 2020-09-03 | Stop reason: HOSPADM

## 2020-09-03 RX ORDER — EPHEDRINE SULFATE/0.9% NACL/PF 50 MG/5 ML
SYRINGE (ML) INTRAVENOUS AS NEEDED
Status: DISCONTINUED | OUTPATIENT
Start: 2020-09-03 | End: 2020-09-03 | Stop reason: HOSPADM

## 2020-09-03 RX ORDER — DEXTROSE MONOHYDRATE AND SODIUM CHLORIDE 5; .45 G/100ML; G/100ML
75 INJECTION, SOLUTION INTRAVENOUS CONTINUOUS
Status: DISCONTINUED | OUTPATIENT
Start: 2020-09-03 | End: 2020-09-04 | Stop reason: HOSPADM

## 2020-09-03 RX ORDER — HYDROMORPHONE HYDROCHLORIDE 1 MG/ML
.5-1 INJECTION, SOLUTION INTRAMUSCULAR; INTRAVENOUS; SUBCUTANEOUS
Status: DISCONTINUED | OUTPATIENT
Start: 2020-09-03 | End: 2020-09-03 | Stop reason: HOSPADM

## 2020-09-03 RX ORDER — BUPIVACAINE HYDROCHLORIDE AND EPINEPHRINE 5; 5 MG/ML; UG/ML
INJECTION, SOLUTION EPIDURAL; INTRACAUDAL; PERINEURAL AS NEEDED
Status: DISCONTINUED | OUTPATIENT
Start: 2020-09-03 | End: 2020-09-03 | Stop reason: HOSPADM

## 2020-09-03 RX ORDER — ONDANSETRON 4 MG/1
8 TABLET, ORALLY DISINTEGRATING ORAL
Status: DISCONTINUED | OUTPATIENT
Start: 2020-09-03 | End: 2020-09-04 | Stop reason: HOSPADM

## 2020-09-03 RX ORDER — PHENYLEPHRINE HCL IN 0.9% NACL 0.4MG/10ML
SYRINGE (ML) INTRAVENOUS AS NEEDED
Status: DISCONTINUED | OUTPATIENT
Start: 2020-09-03 | End: 2020-09-03 | Stop reason: HOSPADM

## 2020-09-03 RX ORDER — DIVALPROEX SODIUM 500 MG/1
500 TABLET, DELAYED RELEASE ORAL
COMMUNITY
End: 2021-01-13

## 2020-09-03 RX ORDER — SUCCINYLCHOLINE CHLORIDE 20 MG/ML
INJECTION INTRAMUSCULAR; INTRAVENOUS AS NEEDED
Status: DISCONTINUED | OUTPATIENT
Start: 2020-09-03 | End: 2020-09-03 | Stop reason: HOSPADM

## 2020-09-03 RX ADMIN — FENTANYL CITRATE 50 MCG: 0.05 INJECTION, SOLUTION INTRAMUSCULAR; INTRAVENOUS at 12:21

## 2020-09-03 RX ADMIN — Medication 50 MCG: at 12:58

## 2020-09-03 RX ADMIN — Medication 50 MCG: at 15:02

## 2020-09-03 RX ADMIN — HYDROMORPHONE HYDROCHLORIDE 0.5 MG: 2 INJECTION INTRAMUSCULAR; INTRAVENOUS; SUBCUTANEOUS at 15:30

## 2020-09-03 RX ADMIN — Medication 50 MCG: at 14:00

## 2020-09-03 RX ADMIN — Medication 100 MCG: at 14:07

## 2020-09-03 RX ADMIN — HYDROMORPHONE HYDROCHLORIDE 0.5 MG: 2 INJECTION INTRAMUSCULAR; INTRAVENOUS; SUBCUTANEOUS at 15:24

## 2020-09-03 RX ADMIN — SUCCINYLCHOLINE CHLORIDE 100 MG: 20 INJECTION, SOLUTION INTRAMUSCULAR; INTRAVENOUS; PARENTERAL at 12:28

## 2020-09-03 RX ADMIN — HYDROMORPHONE HYDROCHLORIDE 0.5 MG: 1 INJECTION, SOLUTION INTRAMUSCULAR; INTRAVENOUS; SUBCUTANEOUS at 15:51

## 2020-09-03 RX ADMIN — SODIUM CHLORIDE, SODIUM LACTATE, POTASSIUM CHLORIDE, AND CALCIUM CHLORIDE 125 ML/HR: 600; 310; 30; 20 INJECTION, SOLUTION INTRAVENOUS at 10:43

## 2020-09-03 RX ADMIN — HYDROMORPHONE HYDROCHLORIDE 0.5 MG: 2 INJECTION INTRAMUSCULAR; INTRAVENOUS; SUBCUTANEOUS at 15:15

## 2020-09-03 RX ADMIN — MIDAZOLAM HYDROCHLORIDE 1 MG: 2 INJECTION, SOLUTION INTRAMUSCULAR; INTRAVENOUS at 12:19

## 2020-09-03 RX ADMIN — FENTANYL CITRATE 50 MCG: 0.05 INJECTION, SOLUTION INTRAMUSCULAR; INTRAVENOUS at 12:25

## 2020-09-03 RX ADMIN — DEXAMETHASONE SODIUM PHOSPHATE 8 MG: 4 INJECTION, SOLUTION INTRAMUSCULAR; INTRAVENOUS at 12:58

## 2020-09-03 RX ADMIN — HYDROMORPHONE HYDROCHLORIDE 0.5 MG: 1 INJECTION, SOLUTION INTRAMUSCULAR; INTRAVENOUS; SUBCUTANEOUS at 16:05

## 2020-09-03 RX ADMIN — DIVALPROEX SODIUM 500 MG: 250 TABLET, DELAYED RELEASE ORAL at 19:01

## 2020-09-03 RX ADMIN — LIDOCAINE HYDROCHLORIDE 40 MG: 20 INJECTION, SOLUTION INTRAVENOUS at 12:28

## 2020-09-03 RX ADMIN — PROPOFOL 150 MG: 10 INJECTION, EMULSION INTRAVENOUS at 12:28

## 2020-09-03 RX ADMIN — DEXTROSE MONOHYDRATE AND SODIUM CHLORIDE 75 ML/HR: 5; .45 INJECTION, SOLUTION INTRAVENOUS at 17:08

## 2020-09-03 RX ADMIN — OXYCODONE AND ACETAMINOPHEN 1 TABLET: 7.5; 325 TABLET ORAL at 18:54

## 2020-09-03 RX ADMIN — HYDROMORPHONE HYDROCHLORIDE 0.5 MG: 1 INJECTION, SOLUTION INTRAMUSCULAR; INTRAVENOUS; SUBCUTANEOUS at 16:42

## 2020-09-03 RX ADMIN — Medication 5 MG: at 14:15

## 2020-09-03 RX ADMIN — REMIFENTANIL HYDROCHLORIDE 0.05 MCG/KG/MIN: 1 INJECTION, POWDER, LYOPHILIZED, FOR SOLUTION INTRAVENOUS at 12:52

## 2020-09-03 RX ADMIN — Medication 50 MCG: at 13:58

## 2020-09-03 RX ADMIN — SODIUM CHLORIDE, SODIUM LACTATE, POTASSIUM CHLORIDE, AND CALCIUM CHLORIDE: 600; 310; 30; 20 INJECTION, SOLUTION INTRAVENOUS at 14:34

## 2020-09-03 RX ADMIN — Medication 50 MCG: at 14:59

## 2020-09-03 RX ADMIN — Medication 5 MG: at 14:17

## 2020-09-03 RX ADMIN — ROCURONIUM BROMIDE 5 MG: 10 INJECTION INTRAVENOUS at 12:28

## 2020-09-03 RX ADMIN — PROPOFOL 50 MG: 10 INJECTION, EMULSION INTRAVENOUS at 12:29

## 2020-09-03 RX ADMIN — HYDROMORPHONE HYDROCHLORIDE 0.5 MG: 2 INJECTION INTRAMUSCULAR; INTRAVENOUS; SUBCUTANEOUS at 15:19

## 2020-09-03 RX ADMIN — ONDANSETRON HYDROCHLORIDE 4 MG: 2 SOLUTION INTRAMUSCULAR; INTRAVENOUS at 14:58

## 2020-09-03 NOTE — ROUTINE PROCESS
Bedside shift change report given to NANCY Roche (oncoming nurse) by Giovanny Fisher RN (offgoing nurse). Report included the following information SBAR, Kardex, Intake/Output, MAR and Accordion.

## 2020-09-03 NOTE — H&P
Date of Surgery Update: Mariella Hilliard was seen and examined. History and physical has been reviewed. The patient has been examined.  There have been no significant clinical changes since the completion of the originally dated History and Physical.    Signed By: Tamia Padron MD     September 3, 2020 11:44 AM

## 2020-09-03 NOTE — ANESTHESIA POSTPROCEDURE EVALUATION
Procedure(s):  LEFT SUBSTERNAL THYROIDECTOMY. general    Anesthesia Post Evaluation      Multimodal analgesia: multimodal analgesia not used between 6 hours prior to anesthesia start to PACU discharge  Patient location during evaluation: PACU  Patient participation: complete - patient participated  Level of consciousness: awake  Pain management: adequate  Airway patency: patent  Anesthetic complications: no  Cardiovascular status: acceptable, blood pressure returned to baseline and hemodynamically stable  Respiratory status: acceptable  Hydration status: acceptable  Post anesthesia nausea and vomiting:  controlled  Final Post Anesthesia Temperature Assessment:  Normothermia (36.0-37.5 degrees C)      INITIAL Post-op Vital signs:   Vitals Value Taken Time   /81 9/3/2020  3:50 PM   Temp 37 °C (98.6 °F) 9/3/2020  3:40 PM   Pulse 93 9/3/2020  3:54 PM   Resp 21 9/3/2020  3:54 PM   SpO2 94 % 9/3/2020  3:54 PM   Vitals shown include unvalidated device data.

## 2020-09-03 NOTE — BRIEF OP NOTE
Brief Postoperative Note    Patient: Cj Rivera  YOB: 1945  MRN: [id number]    Date of Procedure: 9/3/2020     Pre-Op Diagnosis: Left substernal thyroid goiter. Post-Op Diagnosis: Same. Procedure(s):  LEFT SUBSTERNAL THYROIDECTOMY    Surgeon(s):  Aston Rodriguez MD    Surgical Assistant: Mateus Stout    Anesthesia: General     Estimated Blood Loss (mL): less than 918     Complications: None    Specimens:   ID Type Source Tests Collected by Time Destination   1 : Left Substernal Goiter Preservative Thyroid  Aston Rodriguez MD 9/3/2020 1445 Pathology        Implants:   Implant Name Type Inv. Item Serial No.  Lot No. LRB No. Used Action   SURGICEL 4 X 8 1952 - SNA Other SURGICEL 4 X 8 1952 NA ETHICON [id number] N/A 1 Implanted       Drains:   Abhijit-Green Drain 09/03/20 Left Neck (Active)       Findings: Very large left-sided goiter extending slightly lateral to IJ and carotid artery and superiorly just below hyoid bone. Inferiorly, the goiter extended into the mediastinum but was able to be removed via transcervical approach. The goiter extended to the isthmus but the right lobe appeared normal size. Some small, soft nodules present on right but no firm mass. Right thyroid lobe preserved.      Electronically Signed by Ventura Robbins MD on 9/3/2020 at 3:15 PM

## 2020-09-03 NOTE — ANESTHESIA PREPROCEDURE EVALUATION
Relevant Problems   No relevant active problems       Anesthetic History   No history of anesthetic complications            Review of Systems / Medical History  Patient summary reviewed, nursing notes reviewed and pertinent labs reviewed    Pulmonary  Within defined limits                 Neuro/Psych     seizures (Onset 5/2019, started on Depekote with good control)    Psychiatric history (bipolar)     Cardiovascular    Hypertension                Comments: Prolonged qt   GI/Hepatic/Renal  Within defined limits              Endo/Other  Within defined limits        Pertinent negatives: Hypothyroidism: thyroid nodule.    Other Findings   Comments: Degenerative disc disease: Lumbar fusion 2018, ACDF 5/2019, continues with cervical myelopathy, leg weakness           Physical Exam    Airway  Mallampati: III  TM Distance: 4 - 6 cm  Neck ROM: decreased range of motion   Mouth opening: Normal     Cardiovascular    Rhythm: regular  Rate: normal         Dental    Dentition: Lower dentition intact and Upper dentition intact     Pulmonary  Breath sounds clear to auscultation               Abdominal  GI exam deferred       Other Findings            Anesthetic Plan    ASA: 3  Anesthesia type: general          Induction: Intravenous  Anesthetic plan and risks discussed with: Patient

## 2020-09-03 NOTE — PERIOP NOTES
TRANSFER - OUT REPORT:    Verbal report given to Michaelle RN on Rocio Heaton  being transferred to room 523 for routine post - op       Report consisted of patients Situation, Background, Assessment and   Recommendations(SBAR). Information from the following report(s) OR Summary, Intake/Output, MAR and Med Rec Status was reviewed with the receiving nurse. Opportunity for questions and clarification was provided. Patient transported by unit bed with EKG monitor and defibrillator, O2 by ambu bag, ET tube, with oximeter.

## 2020-09-04 VITALS
WEIGHT: 132 LBS | RESPIRATION RATE: 16 BRPM | SYSTOLIC BLOOD PRESSURE: 113 MMHG | BODY MASS INDEX: 22.66 KG/M2 | TEMPERATURE: 99 F | DIASTOLIC BLOOD PRESSURE: 49 MMHG | HEART RATE: 79 BPM | OXYGEN SATURATION: 96 %

## 2020-09-04 PROCEDURE — 74011250637 HC RX REV CODE- 250/637: Performed by: SPECIALIST

## 2020-09-04 RX ORDER — HYDROCODONE BITARTRATE AND ACETAMINOPHEN 5; 325 MG/1; MG/1
2 TABLET ORAL
Qty: 18 TAB | Refills: 0 | Status: SHIPPED | OUTPATIENT
Start: 2020-09-04 | End: 2020-09-09

## 2020-09-04 RX ADMIN — DIVALPROEX SODIUM 500 MG: 250 TABLET, DELAYED RELEASE ORAL at 12:15

## 2020-09-04 RX ADMIN — DIVALPROEX SODIUM 500 MG: 250 TABLET, DELAYED RELEASE ORAL at 01:03

## 2020-09-04 RX ADMIN — DIVALPROEX SODIUM 500 MG: 250 TABLET, DELAYED RELEASE ORAL at 06:54

## 2020-09-04 RX ADMIN — OXYCODONE AND ACETAMINOPHEN 1 TABLET: 7.5; 325 TABLET ORAL at 06:57

## 2020-09-04 NOTE — PROGRESS NOTES
Patient given discharge instructions and follow up. Patient verbalizes understanding of discharge instructions, follow up and post op care. PIV removed. Patient offered assistance with dressing. Patient states she will dress herself, waiting for underclothing from family coming after 1pm. Patient denies discomfort. Awaiting ride after 1pm. Primary nurse made aware. PIV removed.

## 2020-09-04 NOTE — PROGRESS NOTES
9/4/2020  CARE MANAGEMENT NOTE:    Reason for Admission:  Thyroid goiter; and s/p left thyroidectomy POD #1                    RUR Score:  N/A                   Plan for utilizing home health:   No       PCP: First and Last name:  Dr. Nirav MÉNDEZ   Name of Practice:    Are you a current patient: Yes/No: Yes   Approximate date of last visit:  Unknown; full assessment not complete 2/2 low risk   Can you participate in a virtual visit with your PCP: Unknown                    Current Advanced Directive/Advance Care Plan: Full Code; Adv Care Plan not on file                         Transition of Care Plan:    1. Pt will return home without any discharge needs  2. Outpt f/u  3. OBS letter explained and provided to pt  4. [facility] will transport pt home after 1 p.m. No further needs identified at this writing.   Emeli

## 2020-09-04 NOTE — PROGRESS NOTES
Bedside shift change report given to Tesha Quinones RN (oncoming nurse) by Tylor Espinosa RN (offgoing nurse). Report included the following information SBAR, Kardex, Procedure Summary, Intake/Output, Recent Results and Med Rec Status.

## 2020-09-04 NOTE — DISCHARGE INSTRUCTIONS
Thyroidectomy Postoperative Instructions    1. Activity:  No heavy lifting or straining for 8 to 10 days. 2.  Wound care:  Generally, Dermabond is used to cover the neck incision. This is waterproof and no care is necessary. A shower or bath can be taken but the neck should not be submerged completely under water. Gentle soap can be used to clean the neck and then lightly pat dry. If a surgical drain was placed, there will be a small scab around the drain site that will need to be covered with a bandage for a few days. After the Dermabond is removed at the first post-operative visit, an antibiotic ointment such as Neosporin or Bacitracin should be applied 2-3 times daily for about 2 weeks. Excessive sun exposure can promote scarring and therefore a high-SUV sun screen should be used around the incision region for 6 months. 3.  Diet:  A soft diet may be better tolerated for the first few days. Diet can be advanced as tolerated. 4.  Infection:  Rarely, infections can develop following thyroid surgery. Concerning signs of a wound infection are painful swelling, redness, abnormal drainage from around the incision and persistent fevers (even low-grade). 5.  Thyroid function:  Depending on whether a total or partial thyroidectomy was performed, there may be a need for thyroid hormone replacement. Typically, if only half the thyroid was removed, hormone replacement is not necessary. Look for signs of low thyroid such as excessive fatigue, cold intolerance, unexplained weight gain or hair loss. Your thyroid function will usually be checked with a blood test within 3-6 weeks after surgery. 6.  Calcium:  In rare cases, calcium levels can drop after thyroid surgery. Signs of low calcium are excessive muscle twitching, muscle spasm, weakness and nervousness.   If the entire thyroid has been removed, it is recommended to take a calcium supplement such as Oscal D twice daily until further advised. It is imperative to contact your surgeon or endocrinologist if any of these symptoms arise. 7.  Follow up is necessary within 7-10 days following surgery. Please call the office at  [phone] to make an appointment. If any concerns or questions arise, call the   office at any time. If off-hours, the answering service will be reached and on-call     physician will be paged.

## 2020-09-04 NOTE — PROGRESS NOTES
Otolaryngology - Head & Neck Surgery Progress Note        PATIENT NAME: Sakina Marie  MRN: [id number]  DATE: 9/4/2020  ADMISSION DATE: 9/3/2020      Subjective:     Did well overnight. Minimal pain. TRISTAN 0 output overnight. Objective:     Visit Vitals  /62 (BP 1 Location: Right arm, BP Patient Position: At rest)   Pulse 81   Temp 98 °F (36.7 °C)   Resp 16   Wt 59.9 kg (132 lb)   SpO2 96%   BMI 22.66 kg/m²     09/02 1901 - 09/04 0700  In: 1400 [I.V.:1400]  Out: 650 [Urine:600]    Physical Exam:     NAD  Voice OK, mild raspy. Neck - Incision OK, TRISTAN removed. Assessment:     Doing well s/p substernal goiter resection. Plan:     Discharge to  Home. Follow up in 7 days.       Dodie Steinberg MD   [phone] - Office   [phone] - Cell

## 2020-09-04 NOTE — OP NOTES
[facility]asen [facility] 79  OPERATIVE REPORT    Name:  Chelsea Milner  MR#:  [id number]  :  1945  ACCOUNT #:  [de-identified]  DATE OF SERVICE:  2020    PREOPERATIVE DIAGNOSIS:  Goiter. POSTOPERATIVE DIAGNOSIS:  Left substernal thyroid goiter. PROCEDURE PERFORMED:  Excision of left substernal thyroid goiter. SURGEON:  Kishore Henning MD    ASSISTANT:  Clint Xiao    ANESTHESIA:  General endotracheal.    COMPLICATIONS:  None. SPECIMENS REMOVED:  Left substernal goiter. IMPLANTS:  None. ESTIMATED BLOOD LOSS:  75 mL. DRAINS:  10-Ukrainian Abhijit-Green. INDICATIONS FOR SURGERY:  The patient is a 59-year-old female with a long history of left-sided thyroid goiter. She has a very pleasant prominent goiter extending to her lateral neck on the left side. She has some mild compressive symptoms. She had some smaller nodules on the right side. Following discussion regarding the management options, a decision was made to proceed with a partial versus total thyroidectomy. OPERATIVE FINDINGS:  There was a massive left-sided thyroid goiter extending lateral to the carotid artery and internal jugular vein and extending just inferior to the hyoid bone. The goiter also extended into the substernal region. The thyroid was able to be resected from the substernal region via a transcervical approach. The goiter appeared to extend directly to the thyroid isthmus, but did not extend at all to the right side. The right thyroid was inspected and palpated and appeared to be normal size. There was a few very soft nodules and a decision was made to leave the right thyroid intact. Surgical pathology is pending. DESCRIPTION OF PROCEDURE:  The patient was met in the preoperative area where the procedure was discussed in detail and an operative permit was obtained. She was then transferred to the operating room where she was placed in a supine position on the operating table.   General anesthesia was commenced and she was orotracheally intubated using an EMG electrode monitoring tube for monitoring of the laryngeal nerve complex. A Ricketts catheter was placed. The neck was extended and the future incision site was marked and subcutaneously infiltrated with 0.5% bupivacaine with epinephrine using a total of 10 mL. The patient was then prepped and draped in a sterile fashion. A multidisciplinary surgical time-out was then performed. A transverse incision was made in the anterior neck corresponding to a resting skin tension line. The incision was carried slightly to the right at the midline but well to the left side almost lateral to the sternocleidomastoid muscle given the extent of the thyroid mass. Dissection was carried through the immediate subcutaneous tissues. The left strap muscles were transected to allow for access to the left-sided goiter. The sternocleidomastoid muscle was then retracted laterally to expose the lateral extent of the thyroid. Careful dissection was carried along the lateral aspect of the thyroid. The internal jugular vein was carefully dissected away from the thyroid capsule. Underneath the lateral aspect of the thyroid was the internal jugular vein as well as the vagus nerve which were identified and preserved. The dissection was then carried superiorly where eventually, the superior pole vessels were identified. Care was taken to perform careful dissection in this region to preserve the superior laryngeal nerve. Several very large veins were divided mainly with the Harmonic scalpel. Eventually, the superior thyroid artery was identified where it was isolated and medium vascular clips were placed. The artery was then divided using the Harmonic scalpel. This allowed for a rotation of the superior thyroid medially and inferiorly. At this point, dissection was carried along the inferior aspect of the gland just along the anterior tracheal wall. Several large inferior thyroid veins were encountered where they were carefully isolated and divided. The thyroid extended considerably into the substernal region where it was carefully dissected free with finger dissection and able to be delivered into the neck. At this point, the gland was able to be rotated slightly superiorly and medially where the inferior thyroid artery as well as the recurrent laryngeal nerve were identified and preserved. Eventually, the inferior thyroid artery was isolated where it was divided with medium vascular clips and Harmonic scalpel. Eventually, the gland was dissected away from the anterior tracheal wall. The posterior suspensory ligament was carefully divided. The thyroid was divided along the right lateral aspect of the isthmus, appreciating that the right thyroid lobe appeared to be normal in size and a decision was made not to proceed with right thyroid lobectomy. The gland was then eventually completely resected where it was handed off the table and sent to Pathology as a permanent specimen. The wound was irrigated with saline. Hemostasis appeared to be fairly good. A small amount of bleeding along the posterior suspensory ligament was controlled with bipolar electrocautery. The wound was again irrigated. A 10-Khmer Abhijit-Green drain was placed through a separate stab incision just left of the lateral aspect of the incision in the left neck. Some Surgicel was placed into the surgical bed. The wound was closed in layers with the deep layers being approximated using 3-0 chromic. The strap muscles were approximated as well as the platysma muscle. The subcuticular layer was approximated using 4-0 Monocryl in a running subcuticular fashion. 0.5% bupivacaine with epinephrine was injected into the incision. A dressing was placed. The drapes were then removed. The Ricketts catheter was removed as well. The patient was awakened and extubated without event.   She was safely transferred to the Postanesthesia Care Unit. All instruments and sponges were accounted for at the end of the procedure. There were no known intraoperative complications.       Li Granados MD      [id number]  D:  09/03/2020 15:34  T:  09/04/2020 1:02  JOB #:  [id number]

## 2021-01-13 RX ORDER — DIVALPROEX SODIUM 500 MG/1
TABLET, DELAYED RELEASE ORAL
Qty: 180 TAB | Refills: 0 | Status: SHIPPED | OUTPATIENT
Start: 2021-01-13 | End: 2021-01-28

## 2021-01-15 NOTE — TELEPHONE ENCOUNTER
Called patient and scheduled her for 1/28/ 21 @ 1pm. Her daughter in law brings her to the appts so if this does not work they will call back and let us know. Reminder mailed to patient.

## 2021-01-28 ENCOUNTER — OFFICE VISIT (OUTPATIENT)
Dept: NEUROLOGY | Age: 76
End: 2021-01-28
Payer: MEDICARE

## 2021-01-28 VITALS — OXYGEN SATURATION: 96 % | HEART RATE: 71 BPM | SYSTOLIC BLOOD PRESSURE: 112 MMHG | DIASTOLIC BLOOD PRESSURE: 66 MMHG

## 2021-01-28 DIAGNOSIS — G95.9 CERVICAL MYELOPATHY (HCC): ICD-10-CM

## 2021-01-28 DIAGNOSIS — G40.909 SEIZURE DISORDER (HCC): Primary | ICD-10-CM

## 2021-01-28 DIAGNOSIS — M62.838 MUSCLE SPASTICITY: ICD-10-CM

## 2021-01-28 PROCEDURE — G8754 DIAS BP LESS 90: HCPCS | Performed by: PSYCHIATRY & NEUROLOGY

## 2021-01-28 PROCEDURE — G9717 DOC PT DX DEP/BP F/U NT REQ: HCPCS | Performed by: PSYCHIATRY & NEUROLOGY

## 2021-01-28 PROCEDURE — 3017F COLORECTAL CA SCREEN DOC REV: CPT | Performed by: PSYCHIATRY & NEUROLOGY

## 2021-01-28 PROCEDURE — G8752 SYS BP LESS 140: HCPCS | Performed by: PSYCHIATRY & NEUROLOGY

## 2021-01-28 PROCEDURE — G8428 CUR MEDS NOT DOCUMENT: HCPCS | Performed by: PSYCHIATRY & NEUROLOGY

## 2021-01-28 PROCEDURE — G8420 CALC BMI NORM PARAMETERS: HCPCS | Performed by: PSYCHIATRY & NEUROLOGY

## 2021-01-28 PROCEDURE — 1101F PT FALLS ASSESS-DOCD LE1/YR: CPT | Performed by: PSYCHIATRY & NEUROLOGY

## 2021-01-28 PROCEDURE — G8536 NO DOC ELDER MAL SCRN: HCPCS | Performed by: PSYCHIATRY & NEUROLOGY

## 2021-01-28 PROCEDURE — 1090F PRES/ABSN URINE INCON ASSESS: CPT | Performed by: PSYCHIATRY & NEUROLOGY

## 2021-01-28 PROCEDURE — 99214 OFFICE O/P EST MOD 30 MIN: CPT | Performed by: PSYCHIATRY & NEUROLOGY

## 2021-01-28 PROCEDURE — G8400 PT W/DXA NO RESULTS DOC: HCPCS | Performed by: PSYCHIATRY & NEUROLOGY

## 2021-01-28 RX ORDER — DIVALPROEX SODIUM 500 MG/1
500 TABLET, DELAYED RELEASE ORAL 2 TIMES DAILY
Qty: 180 TAB | Refills: 0 | Status: SHIPPED | OUTPATIENT
Start: 2021-01-28 | End: 2021-07-07

## 2021-01-28 NOTE — PATIENT INSTRUCTIONS
We discussed that your EEG done in July 2020 showed some abnormalities that were suspicious (not definitive) for seizure. We discussed that I want to get a 24 hour ambulatory EEG test to get a longer recording and get a better/ longer look at the brain waves. 2 weeks before the Ambulatory EEG, stop taking the Depakote. After you finish the Ambulatory EEG, restart the depakote.

## 2021-01-28 NOTE — PROGRESS NOTES
Kirill Gramajo (1945) is a 76 y.o. female, established patient, here for evaluation of the following     Chief complaint(s):   Chief Complaint   Patient presents with    Seizure       SUBJECTIVE/ OBJECTIVE:    HPI: 76 y.o. female      Reviewed the routine EEG from 7-5-2020 with patient. EEG was abnormal awake and drowsy recording. There were no definite epileptiform discharges during the recording but during rest, there was a pattern of generalized fast-beta activity followed by medium amplitude generalized theta pattern (left sided emphasis). There was no movement associated with this and it did not appear to be typical transition into drowsiness. It was suspicious for subclinical electrographic seizure. 24 hour Ambulatory EEG was ordered but not completed yet. Pt has only had one episode/ cluster of seizures in her lifetime and that was in April 2019 and prompted her to go to [facility] for evaluation, which then led to her having C-spine surgery (severe cervical spinal stenosis, cervical myelopathy). She was seen by inpatient Neurology and started on Depakote during that admission. She has not had any further seizures. Pt had had surgery for cervical myelopathy in April 2019. MRI C-spine and T-spine both and without contrast were ordered at last visit (June 2020) for c/o frequent leg spasms. MRI C-spine didn't show any new cervical stenosis or new area of myelopathy. MRI T-spine didn't show any spinal stenosis or areas of myelopathy. Review of Systems: as above    ========================================    Brief Hx:     See initial visit 4- for full hx     Briefly. I ordered MRI C-spine and T-spine at her initial visit to evaluate for cervical or thoracic myelopathy (severe spasticity in legs). MRI C-spine showed severe multi-level spinal stenosis (C4-5, C5-6, C6-7) with myelomalacia at C6-7. MRI C-spine also showed a large enhancing mass on the left side of her neck near the left carotid space. MRI T-spine showed mild exaggerated kyphosis, no thoracic spinal stenosis, no thoracic cord lesions, + ventral osteophytes suggesting DISH (spondylosis), post-surgical changes at L4-5 with posterior spinal fusion. Given Rx Baclofen 10 mg one to two tabs TID for the spasticity. Granddaughter/ pt says that she never filled the Baclofen but the day after her MRIs she had multiple seizures, fell down broke her nose. Taken to [address] [place] [address] and G-daughter says she had seizure there, was intubated and started on Depakote. Granddaughter says that before each of the seizures she had some stuttering type speech then went into the seizure. Never had any seizures before but granddaughter says that maybe year ago she had some brief stuttering but nothing else happened. She's also tried Baclofen in the past (Rx'd by Orthopedic) but didn't cause any seizure. MRI C-spine and T-spine both and without contrast were ordered in June 2020 for c/o frequent leg spasms. MRI C-spine didn't show any new cervical stenosis or new area of myelopathy. MRI T-spine didn't show any spinal stenosis or areas of myelopathy. No Known Allergies    Current Outpatient Medications   Medication Sig Dispense Refill    divalproex DR (DEPAKOTE) 500 mg tablet Take 1 Tab by mouth two (2) times a day. 180 Tab 0    oxyCODONE-acetaminophen (PERCOCET 7.5) 7.5-325 mg per tablet every six (6) hours as needed.          Past Medical History:   Diagnosis Date    Acquired spondylolisthesis     Anxiety     Arthritis     Chronic pain     Degeneration of lumbar intervertebral disc     Depression     Diverticulosis of large intestine 2018    sigmoid & descending    Goiter     Heart murmur     Hypertension 2018    currently no medications    Prolonged Q-T interval on ECG 12/10/2018    Pseudoclaudication     Seizure disorder ([facility] [address] 04/25/2019        has a past surgical history that includes hx lumbar fusion (12/14/2018); hx open cholecystectomy (1986); hx hysterectomy (1986); colonoscopy (N/A, 10/29/2019); and hx cervical fusion (N/A, 05/2019). Physical Exam:    Vitals:    01/28/21 1303   BP: 112/66   BP 1 Location: Left upper arm   BP Patient Position: Sitting   Pulse: 71   SpO2: 96%     No exam performed; entirety of visit spent discussing seizure history, and further evaluation of seizure disorder          ========================================    ASSESSMENT/ PLAN:       ICD-10-CM ICD-9-CM    1. Seizure disorder (HCC)  G40.909 345.90 NEURO EEG 24 HR      divalproex DR (DEPAKOTE) 500 mg tablet   2. Cervical myelopathy (HCC)  G95.9 721.1    3. Muscle spasticity  M62.838 728.85       Check 24 hour Ambulatory EEG    Advised patient (and Daughter-in-law on phone) that patinet should stop her Depakote 2 weeks before the Ambulatory EEG so I can see what EEG pattern is when patient is not on any AED. Also told them that she should restart the Depakote after completing the EEG. Pt working with Pain Management for her chronic spine pain and spasticity      Follow-up and Dispositions    · Return in about 3 months (around 4/28/2021). An electronic signature was used to authenticate this note.   -- Ralph Dickerson MD

## 2021-02-08 ENCOUNTER — TELEPHONE (OUTPATIENT)
Dept: NEUROLOGY | Age: 76
End: 2021-02-08

## 2021-02-08 ENCOUNTER — DOCUMENTATION ONLY (OUTPATIENT)
Dept: INTERNAL MEDICINE CLINIC | Age: 76
End: 2021-02-08

## 2021-02-08 ENCOUNTER — OFFICE VISIT (OUTPATIENT)
Dept: INTERNAL MEDICINE CLINIC | Age: 76
End: 2021-02-08
Payer: MEDICARE

## 2021-02-08 ENCOUNTER — HOSPITAL ENCOUNTER (EMERGENCY)
Age: 76
Discharge: HOME | End: 2021-02-08
Attending: EMERGENCY MEDICINE
Payer: MEDICARE

## 2021-02-08 VITALS
TEMPERATURE: 98 F | OXYGEN SATURATION: 97 % | WEIGHT: 132 LBS | HEART RATE: 60 BPM | RESPIRATION RATE: 18 BRPM | SYSTOLIC BLOOD PRESSURE: 116 MMHG | DIASTOLIC BLOOD PRESSURE: 50 MMHG | HEIGHT: 64 IN | BODY MASS INDEX: 22.53 KG/M2

## 2021-02-08 VITALS
HEART RATE: 108 BPM | RESPIRATION RATE: 18 BRPM | TEMPERATURE: 97 F | DIASTOLIC BLOOD PRESSURE: 92 MMHG | HEIGHT: 64 IN | SYSTOLIC BLOOD PRESSURE: 195 MMHG | BODY MASS INDEX: 22.66 KG/M2

## 2021-02-08 DIAGNOSIS — R56.9 SEIZURE (HCC): Primary | ICD-10-CM

## 2021-02-08 DIAGNOSIS — R29.898 WEAKNESS OF BOTH LEGS: ICD-10-CM

## 2021-02-08 DIAGNOSIS — F43.9 STRESS AT HOME: ICD-10-CM

## 2021-02-08 DIAGNOSIS — F41.9 ANXIETY: ICD-10-CM

## 2021-02-08 DIAGNOSIS — R56.9 SEIZURE-LIKE ACTIVITY (HCC): Primary | ICD-10-CM

## 2021-02-08 LAB
ALBUMIN SERPL-MCNC: 3.6 G/DL (ref 3.5–5)
ALBUMIN/GLOB SERPL: 1 {RATIO} (ref 1.1–2.2)
ALP SERPL-CCNC: 55 U/L (ref 45–117)
ALT SERPL-CCNC: 16 U/L (ref 12–78)
ANION GAP SERPL CALC-SCNC: 10 MMOL/L (ref 5–15)
AST SERPL-CCNC: 11 U/L (ref 15–37)
BASOPHILS # BLD: 0 K/UL (ref 0–0.1)
BASOPHILS NFR BLD: 1 % (ref 0–1)
BILIRUB SERPL-MCNC: 0.6 MG/DL (ref 0.2–1)
BUN SERPL-MCNC: 17 MG/DL (ref 6–20)
BUN/CREAT SERPL: 31 (ref 12–20)
CALCIUM SERPL-MCNC: 9.4 MG/DL (ref 8.5–10.1)
CHLORIDE SERPL-SCNC: 108 MMOL/L (ref 97–108)
CO2 SERPL-SCNC: 25 MMOL/L (ref 21–32)
CREAT SERPL-MCNC: 0.55 MG/DL (ref 0.55–1.02)
DIFFERENTIAL METHOD BLD: NORMAL
EOSINOPHIL # BLD: 0 K/UL (ref 0–0.4)
EOSINOPHIL NFR BLD: 1 % (ref 0–7)
ERYTHROCYTE [DISTWIDTH] IN BLOOD BY AUTOMATED COUNT: 11.9 % (ref 11.5–14.5)
GLOBULIN SER CALC-MCNC: 3.7 G/DL (ref 2–4)
GLUCOSE SERPL-MCNC: 110 MG/DL (ref 65–100)
HCT VFR BLD AUTO: 38.2 % (ref 35–47)
HGB BLD-MCNC: 13.1 G/DL (ref 11.5–16)
IMM GRANULOCYTES # BLD AUTO: 0 K/UL (ref 0–0.04)
IMM GRANULOCYTES NFR BLD AUTO: 0 % (ref 0–0.5)
LYMPHOCYTES # BLD: 2 K/UL (ref 0.8–3.5)
LYMPHOCYTES NFR BLD: 36 % (ref 12–49)
MAGNESIUM SERPL-MCNC: 2.1 MG/DL (ref 1.6–2.4)
MCH RBC QN AUTO: 32.3 PG (ref 26–34)
MCHC RBC AUTO-ENTMCNC: 34.3 G/DL (ref 30–36.5)
MCV RBC AUTO: 94.3 FL (ref 80–99)
MONOCYTES # BLD: 0.5 K/UL (ref 0–1)
MONOCYTES NFR BLD: 8 % (ref 5–13)
NEUTS SEG # BLD: 3 K/UL (ref 1.8–8)
NEUTS SEG NFR BLD: 54 % (ref 32–75)
NRBC # BLD: 0 K/UL (ref 0–0.01)
NRBC BLD-RTO: 0 PER 100 WBC
PLATELET # BLD AUTO: 220 K/UL (ref 150–400)
PMV BLD AUTO: 9.4 FL (ref 8.9–12.9)
POTASSIUM SERPL-SCNC: 3.3 MMOL/L (ref 3.5–5.1)
PROT SERPL-MCNC: 7.3 G/DL (ref 6.4–8.2)
RBC # BLD AUTO: 4.05 M/UL (ref 3.8–5.2)
SODIUM SERPL-SCNC: 143 MMOL/L (ref 136–145)
VALPROATE SERPL-MCNC: 78 UG/ML (ref 50–100)
WBC # BLD AUTO: 5.5 K/UL (ref 3.6–11)

## 2021-02-08 PROCEDURE — G8752 SYS BP LESS 140: HCPCS | Performed by: PHYSICIAN ASSISTANT

## 2021-02-08 PROCEDURE — 80053 COMPREHEN METABOLIC PANEL: CPT

## 2021-02-08 PROCEDURE — 83735 ASSAY OF MAGNESIUM: CPT

## 2021-02-08 PROCEDURE — 1090F PRES/ABSN URINE INCON ASSESS: CPT | Performed by: PHYSICIAN ASSISTANT

## 2021-02-08 PROCEDURE — 3017F COLORECTAL CA SCREEN DOC REV: CPT | Performed by: PHYSICIAN ASSISTANT

## 2021-02-08 PROCEDURE — G8536 NO DOC ELDER MAL SCRN: HCPCS | Performed by: PHYSICIAN ASSISTANT

## 2021-02-08 PROCEDURE — 99213 OFFICE O/P EST LOW 20 MIN: CPT | Performed by: PHYSICIAN ASSISTANT

## 2021-02-08 PROCEDURE — G9717 DOC PT DX DEP/BP F/U NT REQ: HCPCS | Performed by: PHYSICIAN ASSISTANT

## 2021-02-08 PROCEDURE — G8420 CALC BMI NORM PARAMETERS: HCPCS | Performed by: PHYSICIAN ASSISTANT

## 2021-02-08 PROCEDURE — 80164 ASSAY DIPROPYLACETIC ACD TOT: CPT

## 2021-02-08 PROCEDURE — G8427 DOCREV CUR MEDS BY ELIG CLIN: HCPCS | Performed by: PHYSICIAN ASSISTANT

## 2021-02-08 PROCEDURE — G8400 PT W/DXA NO RESULTS DOC: HCPCS | Performed by: PHYSICIAN ASSISTANT

## 2021-02-08 PROCEDURE — 93005 ELECTROCARDIOGRAM TRACING: CPT

## 2021-02-08 PROCEDURE — 85025 COMPLETE CBC W/AUTO DIFF WBC: CPT

## 2021-02-08 PROCEDURE — 99285 EMERGENCY DEPT VISIT HI MDM: CPT

## 2021-02-08 PROCEDURE — G0463 HOSPITAL OUTPT CLINIC VISIT: HCPCS | Performed by: PHYSICIAN ASSISTANT

## 2021-02-08 PROCEDURE — G8754 DIAS BP LESS 90: HCPCS | Performed by: PHYSICIAN ASSISTANT

## 2021-02-08 PROCEDURE — 36415 COLL VENOUS BLD VENIPUNCTURE: CPT

## 2021-02-08 PROCEDURE — 1101F PT FALLS ASSESS-DOCD LE1/YR: CPT | Performed by: PHYSICIAN ASSISTANT

## 2021-02-08 NOTE — TELEPHONE ENCOUNTER
----- Message from Mary Carmen Townsend sent at 2/8/2021  2:11 PM EST -----  Regarding: Dr. Caitlin Oconnor first and last name: Imtiaz Estrella (daughter in law)      Reason for call: Pt behavior off medication      Callback required yes/no and why: Yes, to provide guidance for pt care      Best contact number(s): [phone]      Details to clarify the request: Darnell Johnsons is calling in regards to pt's behavior since being off her Depakote. She said that pt has dyed her hair purple, she's yelling at Darnell De Santiago and her , throwing things, stuttering, manic. Darnell Elizabeths said that Dr. Radha Navarro said that in the meantime with her being off that medication, if they notice any erratic behavior, to give her her medication. Pt refuses to take it. She needs some guidance as to where to go from here.       Mary Carmen Townsend

## 2021-02-08 NOTE — ED TRIAGE NOTES
Patient arrives via EMS from primary care office. Office called due to patient having psuedosz in office. No injury. Office denied any post dictal period. Patient ambulatory to stretcher with steady gait.  Patient denies any complaints upon arrival.

## 2021-02-08 NOTE — PROGRESS NOTES
Writer contacted [facility] and spoke with Mahamed Barry [phone]. Writer gave history per Nuria Gandhi PA-C regarding patient status during her office visit today. Writer informed Lauren Ballard patient is coming via EMS and appears to be having a possible psychotic break, rambling random thoughts, weeping/crying and what appeared to be a seizure lasting approximately less than 1 minute.

## 2021-02-08 NOTE — TELEPHONE ENCOUNTER
----- Message from Fernando Biswas sent at 2/8/2021  3:48 PM EST -----  Regarding: Dr. Raul Puri telephone  General Message/Vendor Calls    Caller's first and last name: Earl Kerns       Reason for call: update provider on patient       Callback required yes/no and why: yes       Best contact number(s) [phone]       Details to clarify the request: daughter in law of patient stated that the patient had a seizure at her doctors office not to long ago and will be going to [facility]

## 2021-02-08 NOTE — PROGRESS NOTES
No chief complaint on file. she is a 76y.o. year old female who presents for evaluation. Not sure of the exact cause she was coming to the office because we did not get that far in the visit    Reviewed PmHx, RxHx, FmHx, SocHx, AllgHx and updated and dated in the chart. Review of Systems - negative except as listed above    Objective:     Vitals:    02/08/21 1535   BP: (!) 195/92   Pulse: (!) 108   Resp: 18   Temp: 97 °F (36.1 °C)   TempSrc: Temporal   Height: 5' 4\" (1.626 m)     Physical Examination: General appearance - anxious and ill-appearing  Mental status - thought process was random  Eyes - pupils equal and reactive, extraocular eye movements intact  Heart - normal rate and regular rhythm    Assessment/ Plan:   Diagnoses and all orders for this visit:    1. Seizure-like activity ([address]  -     REFERRAL TO EMERGENCY DEPARTMENT    2. Weakness of both legs  -     REFERRAL TO EMERGENCY DEPARTMENT    3. Anxiety  -     REFERRAL TO EMERGENCY DEPARTMENT    4. Stress at home       I was contacted by the nurse that was checking in the patient that she was concerned about the patient's wellbeing. She states that this patient has shared with her that her son was not taking care of her at home. The nurse was concerned that Adult Protective Services should be brought in based on what she was seen. I went into the room to see Ms. Artie Chin. Ms. Artie Chin had changed her parents since the last time she was at the office. Ms. Guillermo Reddy hair is now a bright purple color. When I asked what was going on she became anxious. I then asked if she felt safe at home and she responded stating that her son was not taking care of her. She reports that he does not help to clean her mom or to help her personal needs such as going to the bathroom. I did ask again did she feel safe at home and this is when she started to have a seizure like posture.   She was shaking her upper extremities and looking over to the right while groaning. I proceeded to call Ms. Crawford's name and asked her to calm down. She then stop this seizure-like activity and started to respond again. The patient's eyes were round and reactive and she did not display any type of fogginess or fatigue that she will often see with the patient that has just had a seizure. She then started to tell me random things about previous history. When asked if she had a history of seizures she told me no but she is supposed to be having a EEG done. When asked who ordered the EEG the patient did not respond. I felt that it was in the best interest of the patient to transport her to the hospital for further evaluation. I do not know if the allegations against her son are true or not but Adult Protective Services to visit to make sure all is well. All of this was shared with the EMS when they came to the office to pick her up. I have discussed the diagnosis with the patient and the intended plan as seen in the above orders. The patient has received an after-visit summary and questions were answered concerning future plans.      Medication Side Effects and Warnings were discussed with patient: n/a  Patient Labs were reviewed and or requested: yes  Patient Past Records were reviewed and or requested  yes    Tanna Thomas PA-C

## 2021-02-08 NOTE — TELEPHONE ENCOUNTER
Returned call. Informed her that Dr. Dallas Gilmore is out of the office. She states understanding. Patient is at [facility] now. Daughter in law just wanted to keep dr. Anabelle Craig informed.

## 2021-02-09 ENCOUNTER — PATIENT OUTREACH (OUTPATIENT)
Dept: CASE MANAGEMENT | Age: 76
End: 2021-02-09

## 2021-02-09 LAB
ATRIAL RATE: 71 BPM
CALCULATED P AXIS, ECG09: 78 DEGREES
CALCULATED R AXIS, ECG10: 40 DEGREES
CALCULATED T AXIS, ECG11: 41 DEGREES
DIAGNOSIS, 93000: NORMAL
P-R INTERVAL, ECG05: 134 MS
Q-T INTERVAL, ECG07: 442 MS
QRS DURATION, ECG06: 82 MS
QTC CALCULATION (BEZET), ECG08: 480 MS
VENTRICULAR RATE, ECG03: 71 BPM

## 2021-02-09 NOTE — PROGRESS NOTES
ED 21:  Seizure    Patient contacted regarding recent discharge and COVID-19 risk. Discussed COVID-19 related testing which was not done at this time. Test results were not done. Patient informed of results, if available? no    Outreach made within 2 business days of discharge: Yes    Care Transition Nurse/ Ambulatory Care Manager/ LPN Care Coordinator contacted the patient by telephone to perform post discharge assessment. Verified name and  with patient as identifiers. Patient has following risk factors of: pain. CTN/ACM/LPN reviewed discharge instructions, medical action plan and red flags related to discharge diagnosis. Reviewed and educated them on any new and changed medications related to discharge diagnosis. Advised obtaining a 90-day supply of all daily and as-needed medications. Advance Care Planning:   Does patient have an Advance Directive: currently not on file; education provided    No changes made to Healthcare Decision Maker: remains as Leland Fake. Education provided regarding infection prevention, and signs and symptoms of COVID-19 and when to seek medical attention with patient who verbalized understanding. Discussed exposure protocols and quarantine from [address] you at higher risk for severe illness  and given an opportunity for questions and concerns. The patient agrees to contact the COVID-19 hotline [phone] or PCP office for questions related to their healthcare. CTN/ACM/LPN provided contact information for future reference. From CDC: Are you at higher risk for severe illness?  Wash your hands often.  Avoid close contact (6 feet, which is about two arm lengths) with people who are sick.  Put distance between yourself and other people if COVID-19 is spreading in your community.  Clean and disinfect frequently touched surfaces.  Avoid all cruise travel and non-essential air travel.    Call your healthcare professional if you have concerns about COVID-19 and your underlying condition or if you are sick. For more information on steps you can take to protect yourself, see CDC's How to Protect Yourself      Patient/family/caregiver given information for [employer] and agrees to enroll no    Plan for follow-up call in 7-14 days based on severity of symptoms and risk factors.

## 2021-02-09 NOTE — ACP (ADVANCE CARE PLANNING)
Advance Care Planning:   Does patient have an Advance Directive: currently not on file; education provided    No changes made to Healthcare Decision Maker: remains as Sanjuana Dire.

## 2021-02-11 NOTE — ED PROVIDER NOTES
75-year-old female presents from her doctor's office via EMS with possible seizure activity. Per EMS, the patient was in her primary care doctor's office when she began to have spastic movements of her extremities. The doctor's office suspected a pseudoseizure. The patient sustained no injuries and EMS reports there was no post ictal state. Patient arrives in awake and alert with no complaints. Her past medical history is significant for seizure disorder for which she is on Depakote. She also has hypertension, depression, chronic pain, arthritis, anxiety. Patient adds that she is followed by neurology and is scheduled for EEG testing later this week. She denies any headache, cough, fever, vomiting, diarrhea. She has no chest pain or shortness of breath.            Past Medical History:   Diagnosis Date    Acquired spondylolisthesis     Anxiety     Arthritis     Chronic pain     Degeneration of lumbar intervertebral disc     Depression     Diverticulosis of large intestine 2018    sigmoid & descending    Goiter     Heart murmur     Hypertension 2018    currently no medications    Prolonged Q-T interval on ECG 12/10/2018    Pseudoclaudication     Seizure disorder ([address] 04/25/2019       Past Surgical History:   Procedure Laterality Date    COLONOSCOPY N/A 10/29/2019    COLONOSCOPY performed by Susan Holloway MD at [facility] HX CERVICAL FUSION N/A 05/2019    HX HYSTERECTOMY  1986    HX LUMBAR FUSION  12/14/2018    L4-5    HX OPEN CHOLECYSTECTOMY  1986    3 months after childbirth         Family History:   Problem Relation Age of Onset    Cancer Mother         bone cancer    Heart Failure Father     Cancer Sister         GI    Anesth Problems Neg Hx     Deep Vein Thrombosis Neg Hx        Social History     Socioeconomic History    Marital status:      Spouse name: Not on file    Number of children: Not on file    Years of education: Not on file    Highest education level: Not on file   Occupational History    Not on file   Social Needs    Financial resource strain: Not on file    Food insecurity     Worry: Not on file     Inability: Not on file    Transportation needs     Medical: Not on file     Non-medical: Not on file   Tobacco Use    Smoking status: Never Smoker    Smokeless tobacco: Never Used   Substance and Sexual Activity    Alcohol use: No    Drug use: Yes     Types: Prescription, Opiates    Sexual activity: Never   Lifestyle    Physical activity     Days per week: Not on file     Minutes per session: Not on file    Stress: Not on file   Relationships    Social connections     Talks on phone: Not on file     Gets together: Not on file     Attends [employer] service: Not on file     Active member of club or organization: Not on file     Attends meetings of clubs or organizations: Not on file     Relationship status: Not on file    Intimate partner violence     Fear of current or ex partner: Not on file     Emotionally abused: Not on file     Physically abused: Not on file     Forced sexual activity: Not on file   Other Topics Concern    Not on file   Social History Narrative    68year old  female admitted voluntarily for psyychotic behavior. Pt has paranoid delusions. She is widodef and lives alone. She was discharged from [facility] 3 days ago. ALLERGIES: Patient has no known allergies. Review of Systems   Constitutional: Negative for fever. HENT: Negative for facial swelling. Eyes: Negative for visual disturbance. Respiratory: Negative for chest tightness. Cardiovascular: Negative for chest pain. Gastrointestinal: Negative for abdominal pain. Genitourinary: Negative for difficulty urinating and dysuria. Musculoskeletal: Negative for arthralgias. Skin: Negative for rash. Neurological: Negative for headaches. Hematological: Negative for adenopathy. Psychiatric/Behavioral: Negative for suicidal ideas. Vitals:    02/08/21 1700 02/08/21 1701 02/08/21 1730 02/08/21 1800   BP: 112/63  (!) 141/70 (!) 116/50   Pulse:    60   Resp:    18   Temp:       SpO2:  97% 100% 97%   Weight:       Height:                Physical Exam  Vitals signs and nursing note reviewed. Constitutional:       General: She is not in acute distress. Appearance: She is well-developed. HENT:      Head: Normocephalic and atraumatic. Eyes:      General: No scleral icterus. Conjunctiva/sclera: Conjunctivae normal.      Pupils: Pupils are equal, round, and reactive to light. Neck:      Musculoskeletal: Normal range of motion and neck supple. Cardiovascular:      Rate and Rhythm: Normal rate. Heart sounds: No murmur. Pulmonary:      Effort: Pulmonary effort is normal. No respiratory distress. Abdominal:      General: There is no distension. Musculoskeletal: Normal range of motion. Skin:     General: Skin is warm and dry. Findings: No rash. Neurological:      Mental Status: She is alert and oriented to person, place, and time. MDM  Number of Diagnoses or Management Options  Seizure [facility])  Diagnosis management comments: Assessment: Patient seen for possible seizure activity. Her work-up in the ER has been unremarkable. She has been resting comfortably with stable vital signs. I doubt this is a true seizure given the description as well as a lack of any postictal state and normal blood work. Patient already has follow-up with neurology planned. She can return to the emergency department if she has any new or concerning symptoms. Amount and/or Complexity of Data Reviewed  Clinical lab tests: reviewed  Tests in the medicine section of CPT®: reviewed      ED Course as of Feb 11 1002   Mon Feb 08, 2021   1804 ED EKG interpretation:  Rhythm: normal sinus rhythm. Rate (approx.): 71. Axis: normal.  ST segment:  No concerning ST elevations or depressions.  This EKG was interpreted by Lazara Navarro MD,ED Provider.        EKG, 12 LEAD, INITIAL [JM]      ED Course User Index  [JM] Terri Lam MD       Procedures

## 2021-02-24 ENCOUNTER — PATIENT OUTREACH (OUTPATIENT)
Dept: CASE MANAGEMENT | Age: 76
End: 2021-02-24

## 2021-02-24 NOTE — PROGRESS NOTES
Education Provided due to At Risk Condition:  ED: 2/8/21:  Seizure f/u    Patient resolved from [address] episode on 2/24/21. Discussed COVID-19 related testing which was not done at this time. Test results were not done. Patient informed of results, if available? no     Patient/family has been provided the following resources and education related to COVID-19:                         Signs, symptoms and red flags related to COVID-19            CDC exposure and quarantine guidelines            [facility] exposure contact - [phone]            Contact for their local Department of Health                 Patient currently reports that the following symptoms have improved:  Fall/ seizure activity. No further outreach scheduled with this CTN/ACM/LPN/HC/ MA. Episode of Care resolved. Patient has this CTN/ACM/LPN/HC/MA contact information if future needs arise.

## 2021-07-01 DIAGNOSIS — G40.909 SEIZURE DISORDER (HCC): Primary | ICD-10-CM

## 2021-07-07 RX ORDER — DIVALPROEX SODIUM 500 MG/1
500 TABLET, DELAYED RELEASE ORAL 2 TIMES DAILY
Qty: 60 TABLET | Refills: 1 | Status: SHIPPED | OUTPATIENT
Start: 2021-07-07 | End: 2021-07-30 | Stop reason: SDUPTHER

## 2021-07-07 NOTE — TELEPHONE ENCOUNTER
Please d/w Daughter-in-law: We can proceed with EEG while patient is ON Depakote but it may not give us as much information (ie show any underlying seizure discharges). She should go ahead and schedule the EEG, I re-entered the order. Please give her the # for Scheduling.    Schedule a follow up visit to go over those results  I sent a 30 day Rx Depakote with 1 refills (= 2 months total) which should be enough time to get EEG done and follow up in office

## 2021-07-07 NOTE — TELEPHONE ENCOUNTER
Spoke with daughter-in-law Jonn Cross. She said when patient came off the depakote for the EEG she has back to back seizures. THey called the EEG lab and cancelled and was told to restart medication. Daughter-in-law is willing to R/S the EEG as long as the patient has something to keep patient from having seizures. She feels like stopping med prior to EEG is unsafe/  How do we help them feel safe about the testing so it can be R/S?

## 2021-07-30 DIAGNOSIS — G40.909 SEIZURE DISORDER (HCC): ICD-10-CM

## 2021-07-30 RX ORDER — DIVALPROEX SODIUM 500 MG/1
500 TABLET, DELAYED RELEASE ORAL 2 TIMES DAILY
Qty: 60 TABLET | Refills: 0 | Status: SHIPPED | OUTPATIENT
Start: 2021-07-30 | End: 2021-08-12 | Stop reason: SDUPTHER

## 2021-07-30 NOTE — TELEPHONE ENCOUNTER
[facility] states patient needs a 90 day supply of dival proex needs to be sent to her local pharmacy for insurance purposes.

## 2021-07-30 NOTE — TELEPHONE ENCOUNTER
Discharging patient from my care due to multiple no-shows, not completing the necessary testing, not scheduling follow up visit. See dismissal letter (give to  to send to patient via certified mail). Sent 30 day Rx (not 90 day Rx) of her Depakote.   Pt will need to establish care with another Neurologist.

## 2021-08-12 ENCOUNTER — TELEPHONE (OUTPATIENT)
Dept: INTERNAL MEDICINE CLINIC | Age: 76
End: 2021-08-12

## 2021-08-12 DIAGNOSIS — G40.909 SEIZURE DISORDER (HCC): ICD-10-CM

## 2021-08-12 RX ORDER — DIVALPROEX SODIUM 500 MG/1
500 TABLET, DELAYED RELEASE ORAL 2 TIMES DAILY
Qty: 60 TABLET | Refills: 0 | Status: SHIPPED | OUTPATIENT
Start: 2021-08-12 | End: 2021-09-11

## 2021-09-13 ENCOUNTER — DOCUMENTATION ONLY (OUTPATIENT)
Dept: INTERNAL MEDICINE CLINIC | Age: 76
End: 2021-09-13

## 2021-09-13 NOTE — PROGRESS NOTES
Writer contacted [address] to inquire about skin wound. Patient refused to get out of bed for 4-5 days due to not feeling well, had some vomiting and diarrhea, Nehemias Sosa states it was food poisoning from [address] [place] [address]. Patient finally did get up and took a shower and that was when patient asked Nehemias Sosa to peel off a blue chux from the area of the right buttock mostlly purple in color with a small area that was bleeding. Patient has not had her Covid vaccine. Writer has instructed by Oleg Echols to inform Nehemias Sosa that waiting until Thursday is too long and would like [facility] to assess the patient, Nehemias Sosa agreed and will report back to the office tomorrow.

## 2021-09-23 ENCOUNTER — VIRTUAL VISIT (OUTPATIENT)
Dept: INTERNAL MEDICINE CLINIC | Age: 76
End: 2021-09-23
Payer: MEDICARE

## 2021-09-23 DIAGNOSIS — N13.5 URETERAL OBSTRUCTION, LEFT: Primary | ICD-10-CM

## 2021-09-23 DIAGNOSIS — R19.7 DIARRHEA, UNSPECIFIED TYPE: ICD-10-CM

## 2021-09-23 PROCEDURE — 99496 TRANSJ CARE MGMT HIGH F2F 7D: CPT | Performed by: PHYSICIAN ASSISTANT

## 2021-09-23 PROCEDURE — G8427 DOCREV CUR MEDS BY ELIG CLIN: HCPCS | Performed by: PHYSICIAN ASSISTANT

## 2021-09-23 RX ORDER — PANTOPRAZOLE SODIUM 40 MG/1
40 TABLET, DELAYED RELEASE ORAL DAILY
COMMUNITY
End: 2021-10-07 | Stop reason: ALTCHOICE

## 2021-09-23 RX ORDER — TAMSULOSIN HYDROCHLORIDE 0.4 MG/1
0.4 CAPSULE ORAL DAILY
COMMUNITY
End: 2021-12-07 | Stop reason: ALTCHOICE

## 2021-09-23 RX ORDER — PHENAZOPYRIDINE HYDROCHLORIDE 100 MG/1
100 TABLET, FILM COATED ORAL
COMMUNITY
End: 2021-10-07 | Stop reason: ALTCHOICE

## 2021-09-23 RX ORDER — LANOLIN ALCOHOL/MO/W.PET/CERES
325 CREAM (GRAM) TOPICAL 2 TIMES DAILY
COMMUNITY
End: 2021-10-07 | Stop reason: ALTCHOICE

## 2021-09-23 RX ORDER — CEFDINIR 300 MG/1
300 CAPSULE ORAL 2 TIMES DAILY
COMMUNITY
End: 2021-10-07 | Stop reason: ALTCHOICE

## 2021-09-23 NOTE — PROGRESS NOTES
Jayne Polo is a 68 y.o. female who was seen by synchronous (real-time) audio-video technology on 9/23/2021 for Transitions Of Care        Assessment & Plan:   Diagnoses and all orders for this visit:    1. Ureteral obstruction, left  -     REFERRAL TO EMERGENCY DEPARTMENT    2. Diarrhea, unspecified type  -     REFERRAL TO EMERGENCY DEPARTMENT    I spoken to both the patient and her daughter-in-law about going back to the emergency room. I am concerned that the patient is still having diarrhea. By appearance she appears pale and fatigued. I explained to her that I suspect because she is continued to have diarrhea that she is probably dehydrated with hypokalemia again. Also I feel that it is best that she goes back for evaluation of the continued pain that she is having on the left side. The patient and the daughter-in-law agrees with this plan. I spent at least 20 minutes on this visit with this established patient. 712  Subjective:   Patient presents today for follow up transitional care. She was admitted to [facility] on 9/14 and discharged 9/17/2021. She was treated for left ureteral obstruction. She had a stent placed and is supposed to follow up with the urologist on September 30 th. The patient was also treated for severe hypokalemia and acute left pyelonephritis. Today during this visit her daughter in law is present. She reports that the patient is still having diarrhea and appears pale and weak to her. The patient had requested that the daughter-in-law give the history of her ailment. Prior to Admission medications    Medication Sig Start Date End Date Taking? Authorizing Provider   tamsulosin (FLOMAX) 0.4 mg capsule Take 0.4 mg by mouth daily. Yes Provider, Historical   cefdinir (OMNICEF) 300 mg capsule Take 300 mg by mouth two (2) times a day. Yes Provider, Historical   ferrous sulfate 325 mg (65 mg iron) tablet Take 325 mg by mouth two (2) times a day.    Yes Provider, Historical   pantoprazole (PROTONIX) 40 mg tablet Take 40 mg by mouth daily. Yes Provider, Historical   phenazopyridine (PYRIDIUM) 100 mg tablet Take 100 mg by mouth three (3) times daily (after meals). 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    Spinal stenosis, lumbar 12/14/2018    Prolonged Q-T interval on ECG 12/10/2018    Heart murmur on physical examination 11/28/2018    Hyperlipidemia 10/17/2018    Essential hypertension 10/17/2018    Diverticulosis 10/17/2018    Type 2 diabetes mellitus without complication, without long-term current use of insulin ([facility] 10/17/2018    Spinal stenosis 10/17/2018    Thyroid nodule 10/17/2018    Bipolar disorder ([facility] 07/26/2018     Current Outpatient Medications   Medication Sig Dispense Refill    tamsulosin (FLOMAX) 0.4 mg capsule Take 0.4 mg by mouth daily.  cefdinir (OMNICEF) 300 mg capsule Take 300 mg by mouth two (2) times a day.  ferrous sulfate 325 mg (65 mg iron) tablet Take 325 mg by mouth two (2) times a day.  pantoprazole (PROTONIX) 40 mg tablet Take 40 mg by mouth daily.  phenazopyridine (PYRIDIUM) 100 mg tablet Take 100 mg by mouth three (3) times daily (after meals). No Known Allergies    ROS  See above  Objective:   No flowsheet data found. General: alert, cooperative, no distress appears slightly pale and fatigue   Mental  status: normal mood, behavior, speech, dress, motor activity, and thought processes, able to follow commands   HENT: NCAT   Neck: no visualized mass   Resp: no respiratory distress   Neuro: no gross deficits   Skin: no discoloration or lesions of concern on visible areas   Psychiatric: normal affect, consistent with stated mood, no evidence of hallucinations     Additional exam findings: We discussed the expected course, resolution and complications of the diagnosis(es) in detail. Medication risks, benefits, costs, interactions, and alternatives were discussed as indicated. I advised her to contact the office if her condition worsens, changes or fails to improve as anticipated. She expressed understanding with the diagnosis(es) and plan. Ana Sepulveda, was evaluated through a synchronous (real-time) audio-video encounter. The patient (or guardian if applicable) is aware that this is a billable service. Verbal consent to proceed has been obtained within the past 12 months. The visit was conducted pursuant to the emergency declaration under the [address] [place] [address], [address] [place] [address] authority and the Comprehensive [employer] and [employer] Act. Patient identification was verified, and a caregiver was present when appropriate. The patient was located in a state where the provider was credentialed to provide care.     Paulo Thomas PA-C

## 2021-09-23 NOTE — PROGRESS NOTES
1. Have you been to the ER, urgent care clinic since your last visit? Hospitalized since your last visit? Yes, Janelle    2. Have you seen or consulted any other health care providers outside of the [address] [place] [address] since your last visit? Include any pap smears or colon screening. Yes, Urologist on 9/30    Health Maintenance Due   Topic Date Due    Eye Exam Retinal or Dilated  Never done    COVID-19 Vaccine (1) Never done    Shingrix Vaccine Age 50> (1 of 2) Never done    Bone Densitometry (Dexa) Screening  Never done    Pneumococcal 65+ years (1 of 1 - PPSV23) Never done    Medicare Yearly Exam  08/05/2020    Foot Exam Q1  08/05/2020    MICROALBUMIN Q1  08/05/2020    Flu Vaccine (1) 09/01/2021     Patient here today for follow up from hospital and would like to discuss [facility] care service.

## 2021-11-09 RX ORDER — DIVALPROEX SODIUM 500 MG/1
TABLET, DELAYED RELEASE ORAL
Qty: 60 TABLET | Refills: 1 | Status: SHIPPED | OUTPATIENT
Start: 2021-11-09 | End: 2022-02-04

## 2021-12-07 ENCOUNTER — OFFICE VISIT (OUTPATIENT)
Dept: INTERNAL MEDICINE CLINIC | Age: 76
End: 2021-12-07
Payer: MEDICARE

## 2021-12-07 VITALS
DIASTOLIC BLOOD PRESSURE: 71 MMHG | WEIGHT: 158 LBS | BODY MASS INDEX: 26.98 KG/M2 | TEMPERATURE: 98 F | OXYGEN SATURATION: 99 % | HEIGHT: 64 IN | RESPIRATION RATE: 20 BRPM | SYSTOLIC BLOOD PRESSURE: 165 MMHG | HEART RATE: 67 BPM

## 2021-12-07 DIAGNOSIS — R63.1 INCREASED THIRST: ICD-10-CM

## 2021-12-07 DIAGNOSIS — R14.0 ABDOMINAL BLOATING: ICD-10-CM

## 2021-12-07 DIAGNOSIS — Z13.1 SCREENING FOR DIABETES MELLITUS: Primary | ICD-10-CM

## 2021-12-07 DIAGNOSIS — R73.09 ELEVATED GLUCOSE: ICD-10-CM

## 2021-12-07 DIAGNOSIS — Z23 ENCOUNTER FOR IMMUNIZATION: ICD-10-CM

## 2021-12-07 DIAGNOSIS — R35.0 URINARY FREQUENCY: ICD-10-CM

## 2021-12-07 DIAGNOSIS — R19.7 DIARRHEA, UNSPECIFIED TYPE: ICD-10-CM

## 2021-12-07 DIAGNOSIS — Z83.3 FAMILY HISTORY OF DIABETES MELLITUS: ICD-10-CM

## 2021-12-07 PROCEDURE — G8419 CALC BMI OUT NRM PARAM NOF/U: HCPCS | Performed by: PHYSICIAN ASSISTANT

## 2021-12-07 PROCEDURE — G8753 SYS BP > OR = 140: HCPCS | Performed by: PHYSICIAN ASSISTANT

## 2021-12-07 PROCEDURE — G0463 HOSPITAL OUTPT CLINIC VISIT: HCPCS | Performed by: PHYSICIAN ASSISTANT

## 2021-12-07 PROCEDURE — G8427 DOCREV CUR MEDS BY ELIG CLIN: HCPCS | Performed by: PHYSICIAN ASSISTANT

## 2021-12-07 PROCEDURE — 99214 OFFICE O/P EST MOD 30 MIN: CPT | Performed by: PHYSICIAN ASSISTANT

## 2021-12-07 PROCEDURE — 1090F PRES/ABSN URINE INCON ASSESS: CPT | Performed by: PHYSICIAN ASSISTANT

## 2021-12-07 PROCEDURE — 90694 VACC AIIV4 NO PRSRV 0.5ML IM: CPT | Performed by: PHYSICIAN ASSISTANT

## 2021-12-07 PROCEDURE — G8754 DIAS BP LESS 90: HCPCS | Performed by: PHYSICIAN ASSISTANT

## 2021-12-07 PROCEDURE — G9717 DOC PT DX DEP/BP F/U NT REQ: HCPCS | Performed by: PHYSICIAN ASSISTANT

## 2021-12-07 PROCEDURE — 1101F PT FALLS ASSESS-DOCD LE1/YR: CPT | Performed by: PHYSICIAN ASSISTANT

## 2021-12-07 PROCEDURE — G8536 NO DOC ELDER MAL SCRN: HCPCS | Performed by: PHYSICIAN ASSISTANT

## 2021-12-07 PROCEDURE — G8400 PT W/DXA NO RESULTS DOC: HCPCS | Performed by: PHYSICIAN ASSISTANT

## 2021-12-07 NOTE — PROGRESS NOTES
Chief Complaint   Patient presents with    Diabetes    Foot Swelling    Gas     she is a 68y.o. year old female who presents for evaluation. The patient presents the office today for evaluation of stomach bloating as well as some change in her bowels. The patient also reports that she is concerned for diabetes. She reports that she believes that she may have IBS. The patient reports that she has had a recent colonoscopy and was told that everything was okay other than she had some diverticulum. The patient states that over the weekend she had some cramping of the left side of her abdomen as well as some slight diarrhea. She reports that those symptoms have since resolved. She reports that she is still in the process of following up with the urologist but has noticed that she seems to have increased urine frequency as well as thirst.  The patient states that she is concerned for diabetes because it does run in her family. Reviewed PmHx, RxHx, FmHx, SocHx, AllgHx and updated and dated in the chart. Review of Systems - negative except as listed above    Objective:     Vitals:    12/07/21 1454   BP: (!) 165/71   Pulse: 67   Resp: 20   Temp: 98 °F (36.7 °C)   TempSrc: Temporal   SpO2: 99%   Weight: 158 lb (71.7 kg)   Height: 5' 4\" (1.626 m)     Physical Examination: General appearance - alert, well appearing, and in no distress  Mental status - normal mood, behavior, speech, dress, motor activity, and thought processes  Chest - clear to auscultation, no wheezes, rales or rhonchi, symmetric air entry  Heart - normal rate and regular rhythm  Abdomen - soft, nontender, nondistended, no masses or organomegaly    Assessment/ Plan:   Diagnoses and all orders for this visit:    1. Screening for diabetes mellitus  -     HEMOGLOBIN A1C WITH EAG; Future  -     METABOLIC PANEL, COMPREHENSIVE; Future  -     CBC WITH AUTOMATED DIFF; Future    2.  Family history of diabetes mellitus  -     HEMOGLOBIN A1C WITH EAG; Future  -     METABOLIC PANEL, COMPREHENSIVE; Future  -     CBC WITH AUTOMATED DIFF; Future    3. Diarrhea, unspecified type  -     HEMOGLOBIN A1C WITH EAG; Future  -     METABOLIC PANEL, COMPREHENSIVE; Future  -     CBC WITH AUTOMATED DIFF; Future    4. Abdominal bloating  -     METABOLIC PANEL, COMPREHENSIVE; Future  -     CBC WITH AUTOMATED DIFF; Future    5. Increased thirst  -     HEMOGLOBIN A1C WITH EAG; Future    6. Urinary frequency  -     HEMOGLOBIN A1C WITH EAG; Future    7. Elevated glucose  -     HEMOGLOBIN A1C WITH EAG; Future    8. Encounter for immunization  -     FLU ([facility])    Today patient will get some lab work done and should be contact with results when they have returned. The patient's blood pressure was slightly elevated today in the office. I believe that the elevation of blood pressure is due to the patient being a little worked up today. I have asked her to take her blood pressure at home for the next week and to contact the office to let me know how her numbers are running. Also she and I spoke about returning to see the gastroenterologist if she continues to have symptoms of bloating of the abdomen. She and I spoke about different foods that could possibly cause this. The patient states that she normally does not drink milk or foods with dairy. She states very rarely will she have ice cream.  The patient will keep her appointment with the urologist for follow-up. I have discussed the diagnosis with the patient and the intended plan as seen in the above orders. The patient has received an after-visit summary and questions were answered concerning future plans.      Medication Side Effects and Warnings were discussed with patient: yes  Patient Labs were reviewed and or requested: yes  Patient Past Records were reviewed and or requested  yes    Tanna Thomas PA-C

## 2021-12-07 NOTE — PROGRESS NOTES
1. Have you been to the ER, urgent care clinic since your last visit? Hospitalized since your last visit? No    2. Have you seen or consulted any other health care providers outside of the [address] [place] [address] since your last visit? Include any pap smears or colon screening. Urology, [address], Needs to make appointment with Neurology. Patient had Colonoscopy with Dr. Leonor Levin. Health Maintenance Due   Topic Date Due    Eye Exam Retinal or Dilated  Never done    COVID-19 Vaccine (1) Never done    Shingrix Vaccine Age 50> (1 of 2) Never done    Bone Densitometry (Dexa) Screening  Never done    Pneumococcal 65+ years (1 of 1 - PPSV23) Never done    Medicare Yearly Exam  08/05/2020    Foot Exam Q1  08/05/2020    MICROALBUMIN Q1  08/05/2020    Flu Vaccine (1) 09/01/2021     Patient to discuss diabetes, foot swelling, gas and bloating.

## 2022-02-04 RX ORDER — DIVALPROEX SODIUM 500 MG/1
TABLET, DELAYED RELEASE ORAL
Qty: 60 TABLET | Refills: 1 | Status: SHIPPED | OUTPATIENT
Start: 2022-02-04 | End: 2022-05-20 | Stop reason: SDUPTHER

## 2022-03-08 ENCOUNTER — DOCUMENTATION ONLY (OUTPATIENT)
Dept: INTERNAL MEDICINE CLINIC | Age: 77
End: 2022-03-08

## 2022-03-08 NOTE — PROGRESS NOTES
Patient called to inform Gabriela Oglesby that she just had a small seizure and feels she is about to have another one and is going to [facility], will call 911 after hanging up from this call. Patient seemed confused and not answering questions appropriately. Writer attempted to call PHI on file with no answer.

## 2022-03-18 PROBLEM — R94.31 PROLONGED Q-T INTERVAL ON ECG: Status: ACTIVE | Noted: 2018-12-10

## 2022-03-18 PROBLEM — K57.90 DIVERTICULOSIS: Status: ACTIVE | Noted: 2018-10-17

## 2022-03-18 PROBLEM — M48.061 SPINAL STENOSIS, LUMBAR: Status: ACTIVE | Noted: 2018-12-14

## 2022-03-19 PROBLEM — E04.1 THYROID NODULE: Status: ACTIVE | Noted: 2018-10-17

## 2022-03-19 PROBLEM — I10 ESSENTIAL HYPERTENSION: Status: ACTIVE | Noted: 2018-10-17

## 2022-03-19 PROBLEM — M48.00 SPINAL STENOSIS: Status: ACTIVE | Noted: 2018-10-17

## 2022-03-19 PROBLEM — E78.5 HYPERLIPIDEMIA: Status: ACTIVE | Noted: 2018-10-17

## 2022-03-20 PROBLEM — E11.9 TYPE 2 DIABETES MELLITUS WITHOUT COMPLICATION, WITHOUT LONG-TERM CURRENT USE OF INSULIN (HCC): Status: ACTIVE | Noted: 2018-10-17

## 2022-03-20 PROBLEM — E04.9 SUBSTERNAL THYROID GOITER: Status: ACTIVE | Noted: 2020-09-03

## 2022-03-20 PROBLEM — R01.1 HEART MURMUR ON PHYSICAL EXAMINATION: Status: ACTIVE | Noted: 2018-11-28

## 2022-03-20 PROBLEM — E04.9 THYROID GOITER: Status: ACTIVE | Noted: 2020-09-03

## 2022-03-20 PROBLEM — F31.9 BIPOLAR DISORDER (HCC): Status: ACTIVE | Noted: 2018-07-26

## 2022-05-20 NOTE — TELEPHONE ENCOUNTER
Name of Medication? divalproex  (DEPAKOTE) 500 mg tablet   Patient-reported dosage and instructions? TAKE 1 TABLET BY MOUTH TWICE   DAILY   How many days do you have left? 8   [id number]   Pharmacy phone number (if available)? [phone]   Additional Information for Provider? Patient would like these refilled she   has a new patient appointment already scheduled and just new these until   then.  You can her daughter in law Millie

## 2022-05-20 NOTE — TELEPHONE ENCOUNTER
----- Message from Danita [id number] sent at 5/20/2022  2:02 PM EDT -----  Subject: Refill Request    QUESTIONS  Name of Medication? divalproeb CHAMBERS (DEPAKOTE) 500 mg tablet  Patient-reported dosage and instructions? TAKE 1 TABLET BY MOUTH TWICE   DAILY  How many days do you have left? 8  [id number]  Pharmacy phone number (if available)? [phone]  Additional Information for Provider? Patient would like these refilled she   has a new patient appointment already scheduled and just new these until   then. You can her daughter in law Walthall  ---------------------------------------------------------------------------  --------------  [address]  What is the best way for the office to contact you? OK to leave message on   voicemail  Preferred Call Back Phone Number? [phone]  ---------------------------------------------------------------------------  --------------  SCRIPT ANSWERS  Relationship to Patient?  Self

## 2022-05-23 RX ORDER — DIVALPROEX SODIUM 500 MG/1
500 TABLET, DELAYED RELEASE ORAL 2 TIMES DAILY
Qty: 60 TABLET | Refills: 0 | Status: SHIPPED | OUTPATIENT
Start: 2022-05-23 | End: 2022-06-24 | Stop reason: SDUPTHER

## 2022-06-24 RX ORDER — DIVALPROEX SODIUM 500 MG/1
500 TABLET, DELAYED RELEASE ORAL 2 TIMES DAILY
Qty: 60 TABLET | Refills: 0 | Status: SHIPPED | OUTPATIENT
Start: 2022-06-24 | End: 2022-07-19

## 2022-06-24 NOTE — TELEPHONE ENCOUNTER
Future Appointments:  Future Appointments   Date Time Provider Bhaskar Oliva   7/15/2022  2:30 PM Heaven Heath, NP [facility]        Last Appointment With Me:  Visit date not found     Requested Prescriptions     Pending Prescriptions Disp Refills    divalproex DR (DEPAKOTE) 500 mg tablet 60 Tablet 0     Sig: Take 1 Tablet by mouth two (2) times a day.

## 2022-06-24 NOTE — TELEPHONE ENCOUNTER
Future Appointments:  Future Appointments   Date Time Provider Bhaskar Oliva   7/15/2022  2:30 PM Enoch Heath, REMA [facility]        Last Appointment With Me:  Visit date not found     Requested Prescriptions     Pending Prescriptions Disp Refills    divalproex DR (DEPAKOTE) 500 mg tablet 60 Tablet 0     Sig: Take 1 Tablet by mouth two (2) times a day.

## 2022-07-15 ENCOUNTER — NURSE TRIAGE (OUTPATIENT)
Dept: OTHER | Facility: CLINIC | Age: 77
End: 2022-07-15

## 2022-07-15 NOTE — TELEPHONE ENCOUNTER
Received call from [address] at [facility] with Red Flag Complaint. Subjective: Caller states \"She said her stomach hurt last night. Checked on her this morning and she said she was sick overnight. \"     Current Symptoms: Diarrhea, vomiting, stomachache     Onset: Last night     Associated Symptoms: reduced appetite, diarrhea    Pain Severity: Denies pain- feels queasy and cramping     Temperature: Denies fever and feeling feverish/chills     What has been tried: Nothing     LMP: NA Pregnant: NA    Recommended disposition: [address] [facility] was calling to reschedule new patient appointment for today as patient is not feeling well. Care advice provided, patient verbalizes understanding; denies any other questions or concerns; instructed to call back for any new or worsening symptoms. Patient/Caller agrees with recommended disposition; writer provided warm transfer to Jaylin Oquendo at [facility] for appointment scheduling. Attention Provider: Thank you for allowing me to participate in the care of your patient. The patient was connected to triage in response to information provided to the [facility]. Please do not respond through this encounter as the response is not directed to a shared pool.       Reason for Disposition   Vomiting    Protocols used: VOMITING-ADULT-[place]

## 2022-07-19 RX ORDER — DIVALPROEX SODIUM 500 MG/1
TABLET, DELAYED RELEASE ORAL
Qty: 60 TABLET | Refills: 0 | Status: SHIPPED | OUTPATIENT
Start: 2022-07-19 | End: 2022-07-25

## 2023-04-23 NOTE — BSMART NOTE
This counselor followed up from previous [facility] [address] who reported ED DR was waiting on labs. Labs indicated a severe UTI which likely accounts for patient's confusion, disorientation, and tangential thought process. On call psych recommends Keflex and discharge patient with out patient community resources which were provided by this counselor. Yes
